# Patient Record
Sex: MALE | Race: BLACK OR AFRICAN AMERICAN | Employment: OTHER | URBAN - METROPOLITAN AREA
[De-identification: names, ages, dates, MRNs, and addresses within clinical notes are randomized per-mention and may not be internally consistent; named-entity substitution may affect disease eponyms.]

---

## 2019-02-07 ENCOUNTER — OFFICE VISIT (OUTPATIENT)
Dept: ONCOLOGY | Age: 84
End: 2019-02-07

## 2019-02-07 VITALS
DIASTOLIC BLOOD PRESSURE: 77 MMHG | WEIGHT: 178.2 LBS | BODY MASS INDEX: 27.97 KG/M2 | HEART RATE: 57 BPM | RESPIRATION RATE: 16 BRPM | HEIGHT: 67 IN | OXYGEN SATURATION: 97 % | SYSTOLIC BLOOD PRESSURE: 134 MMHG | TEMPERATURE: 97.8 F

## 2019-02-07 DIAGNOSIS — D64.9 ANEMIA, UNSPECIFIED TYPE: ICD-10-CM

## 2019-02-07 DIAGNOSIS — Z11.59 ENCOUNTER FOR SCREENING FOR OTHER VIRAL DISEASES: ICD-10-CM

## 2019-02-07 DIAGNOSIS — D64.9 ANEMIA, UNSPECIFIED TYPE: Primary | ICD-10-CM

## 2019-02-07 DIAGNOSIS — E66.01 MORBIDLY OBESE (HCC): ICD-10-CM

## 2019-02-07 DIAGNOSIS — N18.30 CKD (CHRONIC KIDNEY DISEASE) STAGE 3, GFR 30-59 ML/MIN (HCC): ICD-10-CM

## 2019-02-07 RX ORDER — BISMUTH SUBSALICYLATE 262 MG
1 TABLET,CHEWABLE ORAL DAILY
COMMUNITY

## 2019-02-07 NOTE — PROGRESS NOTES
Alexandria Horowitz is a 80 y.o. male here today as a new patient, anemia Patient noted with increased swelling to lower extremities, some wounds to left lower extremity.

## 2019-02-07 NOTE — PROGRESS NOTES
[facility] [place] [address] [address], [address] W: [phone]  F: [phone] for Visit:  
Myrna Michelle is a 80 y.o. male who is seen in consultation at the request of Dr. Cyn Umana for evaluation of Anemia History of Present Illness:  
Patient is a 80 y.o. male with multiple medical problems including coronary artery disease, aortic stenosis, hypertension, chronic kidney disease who was seen for progressive anemia. He was noted to have a hemoglobin of 7.8 g/dL with an MCV of 88 and no other CBC abnormalities on 1/9/19. Hemoglobin has been declining gradually over the last year. Prior evaluation had revealed a normal B12 level at around 400, no clear evidence of hemolysis. I have a ferritin level from last year that was normal at 124. His last measured creatinine was elevated at 1.3. He now comes for further evaluation. He has no bleeding. No dark stools. No fevers, chills, sweats. No blood in urine. He has no fevers, chills, sweats or new pain. No diarrhea. Has had no weight changes. He has chronic LE edema and stasis dermatitis. Uses a walker but lives alone and independently. He has had fluctuations in his weight. But overall he seems to have lost about 20 lb in 1 year Does not smoke or drink ETOH Past Medical History:  
Diagnosis Date  Cataracts, bilateral   
 Chronic pain  Diabetes ([facility] [address] 2/2/2011  Hypertension Past Surgical History:  
Procedure Laterality Date  HX HERNIA REPAIR    
 HX ORTHOPAEDIC    
 bilat hip replacement  HX PROSTATECTOMY Social History Tobacco Use  Smoking status: Never Smoker  Smokeless tobacco: Never Used Substance Use Topics  Alcohol use: No  
  
No family history on file. Current Outpatient Medications Medication Sig  
 multivitamin (ONE A DAY) tablet Take 1 Tab by mouth daily.  simvastatin (ZOCOR) 20 mg tablet Take 1 Tab by mouth nightly.  aspirin 81 mg chewable tablet Take 1 Tab by mouth daily.  timolol (TIMOPTIC) 0.5 % ophthalmic solution Administer 1 Drop to left eye two (2) times a day.  lisinopril (PRINIVIL, ZESTRIL) 20 mg tablet Take 20 mg by mouth daily.  amLODIPine (NORVASC) 10 mg tablet Take 10 mg by mouth daily.  furosemide (LASIX) 40 mg tablet Take 40 mg by mouth daily.  potassium chloride SA (MICRO-K) 10 mEq capsule Take 10 mEq by mouth daily.  phosphorus (K PHOS NEUTRAL) 250 mg tablet Take 1 Tab by mouth three (3) times daily (with meals). No current facility-administered medications for this visit. No Known Allergies Review of Systems: A complete review of systems was obtained, negative except as described above. Physical Exam:  
 
Visit Vitals /77 (BP 1 Location: Left arm, BP Patient Position: Sitting) Pulse (!) 57 Temp 97.8 °F (36.6 °C) (Oral) Resp 16 Ht 5' 7\" (1.702 m) Wt 178 lb 3.2 oz (80.8 kg) SpO2 97% BMI 27.91 kg/m² ECOG PS: 2 General: No distress, uses a walker Eyes: PERRLA, anicteric sclerae HENT: Atraumatic, OP clear Neck: Supple Lymphatic: No cervical, supraclavicular, or inguinal adenopathy Respiratory: CTAB, normal respiratory effort CV: Normal rate, regular rhythm, no murmurs, no peripheral edema GI: Soft, nontender, nondistended, no masses, no hepatomegaly, no splenomegaly MS: Uses a walker, he has bilateral ;LE edema with scaly skin that is broken down on the L ankle Skin: No rashes, except on LE Psych: Alert, oriented, appropriate affect, normal judgment/insight Results:  
 
Lab Results Component Value Date/Time WBC 8.1 12/20/2015 04:36 AM  
 HGB 9.9 (L) 12/20/2015 04:36 AM  
 HCT 31.5 (L) 12/20/2015 04:36 AM  
 PLATELET 162 89/68/7624 04:36 AM  
 MCV 93.5 12/20/2015 04:36 AM  
 ABS. NEUTROPHILS 5.6 12/20/2015 04:36 AM  
 
Lab Results Component Value Date/Time  Sodium 146 (H) 12/20/2015 04:36 AM  
 Potassium 3.6 12/20/2015 04:36 AM  
 Chloride 115 (H) 12/20/2015 04:36 AM  
 CO2 23 12/20/2015 04:36 AM  
 Glucose 88 12/20/2015 04:36 AM  
 BUN 17 12/20/2015 04:36 AM  
 Creatinine 1.04 12/20/2015 04:36 AM  
 GFR est AA >60 12/20/2015 04:36 AM  
 GFR est non-AA >60 12/20/2015 04:36 AM  
 Calcium 8.0 (L) 12/20/2015 04:36 AM  
 Glucose (POC) 94 12/21/2015 11:19 AM  
 
Lab Results Component Value Date/Time Bilirubin, total 0.5 12/20/2015 04:36 AM  
 ALT (SGPT) 31 12/20/2015 04:36 AM  
 AST (SGOT) 32 12/20/2015 04:36 AM  
 Alk. phosphatase 60 12/20/2015 04:36 AM  
 Protein, total 5.7 (L) 12/20/2015 04:36 AM  
 Albumin 2.6 (L) 12/20/2015 04:36 AM  
 Globulin 3.1 12/20/2015 04:36 AM  
 
 
Outside records Records reviewed and summarized above. Pathology report(s) reviewed above. Radiology report(s) reviewed above. Assessment:  
1) Normocytic anemia- progressive External records were reviewed and it is noted that his hemoglobin has dropped from over 9 g/dL about a year ago to 7.8 g/dL most recently. His MCV is in the normal range. He has no other CBC abnormalities. Prior workup did not reveal any hemolysis or B12 deficiency. His ferritin was normal at 124. He does not endorse any clear bleeding or B symptoms. He does have kidney disease but the anemia is out of proportion to his GFR. An underlying bone marrow disorder is likely such as MDS However he has limited performance status and limited social support. I discussed that we will start with some initial blood work to rule out other possibilities. However if this is unrevealing would have to discuss pros and cons of obtaining a diagnosis with a bone marrow biopsy. I do not see him being a candidate for any treatments for an underlying bone marrow disorder such as MDS 
 
2) CKD 3) CAD On aspirin 4) psychosocial 
He has limited social support. Lives alone. Comes with a friend today. Social work was consulted. Plan: · CBC with differential, methylmalonic acid, iron profile, ferritin, haptoglobin, hepatitis B, hepatitis C, gammopathy evaluation · Social work consulted as we are concerned about his safety at home. In addition he was encouraged to follow-up with Dr. Meron Villanueva and address the open wound on his left leg. Return to clinic in 3-4 weeks to discuss results. I appreciate the opportunity to participate in Mr. Bernadette schilling.  
 
Signed By: Luz Miller MD

## 2019-02-13 LAB
ALBUMIN SERPL ELPH-MCNC: 3.4 G/DL (ref 2.9–4.4)
ALBUMIN SERPL-MCNC: 3.9 G/DL (ref 3.5–4.7)
ALBUMIN/GLOB SERPL: 0.9 {RATIO} (ref 0.7–1.7)
ALBUMIN/GLOB SERPL: 1.1 {RATIO} (ref 1.2–2.2)
ALP SERPL-CCNC: 66 IU/L (ref 39–117)
ALPHA1 GLOB SERPL ELPH-MCNC: 0.3 G/DL (ref 0–0.4)
ALPHA2 GLOB SERPL ELPH-MCNC: 0.9 G/DL (ref 0.4–1)
ALT SERPL-CCNC: 7 IU/L (ref 0–44)
AST SERPL-CCNC: 11 IU/L (ref 0–40)
B-GLOBULIN SERPL ELPH-MCNC: 1.2 G/DL (ref 0.7–1.3)
BASOPHILS # BLD AUTO: 0 X10E3/UL (ref 0–0.2)
BASOPHILS NFR BLD AUTO: 0 %
BILIRUB SERPL-MCNC: 0.4 MG/DL (ref 0–1.2)
BUN SERPL-MCNC: 24 MG/DL (ref 8–27)
BUN/CREAT SERPL: 15 (ref 10–24)
CALCIUM SERPL-MCNC: 9.2 MG/DL (ref 8.6–10.2)
CHLORIDE SERPL-SCNC: 109 MMOL/L (ref 96–106)
CO2 SERPL-SCNC: 23 MMOL/L (ref 20–29)
CREAT SERPL-MCNC: 1.6 MG/DL (ref 0.76–1.27)
EOSINOPHIL # BLD AUTO: 0.2 X10E3/UL (ref 0–0.4)
EOSINOPHIL NFR BLD AUTO: 3 %
ERYTHROCYTE [DISTWIDTH] IN BLOOD BY AUTOMATED COUNT: 15.5 % (ref 12.3–15.4)
FERRITIN SERPL-MCNC: 58 NG/ML (ref 30–400)
GAMMA GLOB SERPL ELPH-MCNC: 1.7 G/DL (ref 0.4–1.8)
GLOBULIN SER CALC-MCNC: 3.6 G/DL (ref 1.5–4.5)
GLOBULIN SER-MCNC: 4.1 G/DL (ref 2.2–3.9)
GLUCOSE SERPL-MCNC: 84 MG/DL (ref 65–99)
HAPTOGLOB SERPL-MCNC: 218 MG/DL (ref 34–200)
HBV SURFACE AG SERPL QL IA: NEGATIVE
HCT VFR BLD AUTO: 23.9 % (ref 37.5–51)
HCV AB S/CO SERPL IA: 0.1 S/CO RATIO (ref 0–0.9)
HGB BLD-MCNC: 7.2 G/DL (ref 13–17.7)
HIV 1+2 AB+HIV1 P24 AG SERPL QL IA: NON REACTIVE
IGA SERPL-MCNC: 350 MG/DL (ref 61–437)
IGG SERPL-MCNC: 1792 MG/DL (ref 700–1600)
IGM SERPL-MCNC: 59 MG/DL (ref 15–143)
IMM GRANULOCYTES # BLD AUTO: 0 X10E3/UL (ref 0–0.1)
IMM GRANULOCYTES NFR BLD AUTO: 0 %
INTERPRETATION SERPL IEP-IMP: ABNORMAL
IRON SATN MFR SERPL: 7 % (ref 15–55)
IRON SERPL-MCNC: 21 UG/DL (ref 38–169)
KAPPA LC FREE SER-MCNC: 77.3 MG/L (ref 3.3–19.4)
KAPPA LC FREE/LAMBDA FREE SER: 1.32 {RATIO} (ref 0.26–1.65)
LAMBDA LC FREE SERPL-MCNC: 58.7 MG/L (ref 5.7–26.3)
LYMPHOCYTES # BLD AUTO: 1.5 X10E3/UL (ref 0.7–3.1)
LYMPHOCYTES NFR BLD AUTO: 19 %
Lab: ABNORMAL
M PROTEIN SERPL ELPH-MCNC: ABNORMAL G/DL
MCH RBC QN AUTO: 27.6 PG (ref 26.6–33)
MCHC RBC AUTO-ENTMCNC: 30.1 G/DL (ref 31.5–35.7)
MCV RBC AUTO: 92 FL (ref 79–97)
METHYLMALONATE SERPL-SCNC: 389 NMOL/L (ref 0–378)
MONOCYTES # BLD AUTO: 0.8 X10E3/UL (ref 0.1–0.9)
MONOCYTES NFR BLD AUTO: 10 %
NEUTROPHILS # BLD AUTO: 5.3 X10E3/UL (ref 1.4–7)
NEUTROPHILS NFR BLD AUTO: 68 %
PATH REV BLD -IMP: ABNORMAL
PATHOLOGIST NAME: ABNORMAL
PLATELET # BLD AUTO: 441 X10E3/UL (ref 150–379)
PLEASE NOTE:, 149534: ABNORMAL
POTASSIUM SERPL-SCNC: 4.2 MMOL/L (ref 3.5–5.2)
PROT SERPL-MCNC: 7.5 G/DL (ref 6–8.5)
RBC # BLD AUTO: 2.61 X10E6/UL (ref 4.14–5.8)
SODIUM SERPL-SCNC: 148 MMOL/L (ref 134–144)
TIBC SERPL-MCNC: 285 UG/DL (ref 250–450)
UIBC SERPL-MCNC: 264 UG/DL (ref 111–343)
WBC # BLD AUTO: 7.8 X10E3/UL (ref 3.4–10.8)

## 2019-05-21 ENCOUNTER — OFFICE VISIT (OUTPATIENT)
Dept: ONCOLOGY | Age: 84
End: 2019-05-21

## 2019-05-21 VITALS
OXYGEN SATURATION: 96 % | SYSTOLIC BLOOD PRESSURE: 127 MMHG | DIASTOLIC BLOOD PRESSURE: 69 MMHG | WEIGHT: 174.5 LBS | TEMPERATURE: 97.5 F | HEART RATE: 69 BPM | BODY MASS INDEX: 27.39 KG/M2 | RESPIRATION RATE: 16 BRPM | HEIGHT: 67 IN

## 2019-05-21 DIAGNOSIS — E66.01 MORBIDLY OBESE (HCC): ICD-10-CM

## 2019-05-21 DIAGNOSIS — D64.9 ANEMIA, UNSPECIFIED TYPE: Primary | ICD-10-CM

## 2019-05-21 DIAGNOSIS — N18.30 CKD (CHRONIC KIDNEY DISEASE) STAGE 3, GFR 30-59 ML/MIN (HCC): ICD-10-CM

## 2019-05-21 RX ORDER — DIPHENHYDRAMINE HYDROCHLORIDE 50 MG/ML
50 INJECTION, SOLUTION INTRAMUSCULAR; INTRAVENOUS AS NEEDED
Status: CANCELLED
Start: 2019-06-19

## 2019-05-21 RX ORDER — ACETAMINOPHEN 325 MG/1
650 TABLET ORAL AS NEEDED
Status: CANCELLED
Start: 2019-06-19

## 2019-05-21 RX ORDER — SODIUM CHLORIDE 9 MG/ML
10 INJECTION INTRAMUSCULAR; INTRAVENOUS; SUBCUTANEOUS AS NEEDED
Status: CANCELLED | OUTPATIENT
Start: 2019-06-19

## 2019-05-21 RX ORDER — ONDANSETRON 2 MG/ML
8 INJECTION INTRAMUSCULAR; INTRAVENOUS AS NEEDED
Status: CANCELLED | OUTPATIENT
Start: 2019-06-19

## 2019-05-21 RX ORDER — ONDANSETRON 2 MG/ML
8 INJECTION INTRAMUSCULAR; INTRAVENOUS AS NEEDED
Status: CANCELLED | OUTPATIENT
Start: 2019-06-05

## 2019-05-21 RX ORDER — HYDROCORTISONE SODIUM SUCCINATE 100 MG/2ML
100 INJECTION, POWDER, FOR SOLUTION INTRAMUSCULAR; INTRAVENOUS AS NEEDED
Status: CANCELLED | OUTPATIENT
Start: 2019-06-05

## 2019-05-21 RX ORDER — ALBUTEROL SULFATE 0.83 MG/ML
2.5 SOLUTION RESPIRATORY (INHALATION) AS NEEDED
Status: CANCELLED
Start: 2019-06-19

## 2019-05-21 RX ORDER — EPINEPHRINE 1 MG/ML
0.3 INJECTION, SOLUTION, CONCENTRATE INTRAVENOUS AS NEEDED
Status: CANCELLED | OUTPATIENT
Start: 2019-06-05

## 2019-05-21 RX ORDER — ALBUTEROL SULFATE 0.83 MG/ML
2.5 SOLUTION RESPIRATORY (INHALATION) AS NEEDED
Status: CANCELLED
Start: 2019-06-05

## 2019-05-21 RX ORDER — EPINEPHRINE 1 MG/ML
0.3 INJECTION, SOLUTION, CONCENTRATE INTRAVENOUS AS NEEDED
Status: CANCELLED | OUTPATIENT
Start: 2019-06-19

## 2019-05-21 RX ORDER — ACETAMINOPHEN 325 MG/1
650 TABLET ORAL AS NEEDED
Status: CANCELLED
Start: 2019-06-05

## 2019-05-21 RX ORDER — DIPHENHYDRAMINE HYDROCHLORIDE 50 MG/ML
50 INJECTION, SOLUTION INTRAMUSCULAR; INTRAVENOUS AS NEEDED
Status: CANCELLED
Start: 2019-06-05

## 2019-05-21 RX ORDER — SODIUM CHLORIDE 9 MG/ML
10 INJECTION INTRAMUSCULAR; INTRAVENOUS; SUBCUTANEOUS AS NEEDED
Status: CANCELLED | OUTPATIENT
Start: 2019-06-05

## 2019-05-21 RX ORDER — SODIUM CHLORIDE 0.9 % (FLUSH) 0.9 %
10 SYRINGE (ML) INJECTION AS NEEDED
Status: CANCELLED
Start: 2019-06-05

## 2019-05-21 RX ORDER — HYDROCORTISONE SODIUM SUCCINATE 100 MG/2ML
100 INJECTION, POWDER, FOR SOLUTION INTRAMUSCULAR; INTRAVENOUS AS NEEDED
Status: CANCELLED | OUTPATIENT
Start: 2019-06-19

## 2019-05-21 RX ORDER — SODIUM CHLORIDE 0.9 % (FLUSH) 0.9 %
10 SYRINGE (ML) INJECTION AS NEEDED
Status: CANCELLED
Start: 2019-06-19

## 2019-05-21 RX ORDER — HEPARIN 100 UNIT/ML
300-500 SYRINGE INTRAVENOUS AS NEEDED
Status: CANCELLED
Start: 2019-06-05

## 2019-05-21 RX ORDER — HEPARIN 100 UNIT/ML
300-500 SYRINGE INTRAVENOUS AS NEEDED
Status: CANCELLED
Start: 2019-06-19

## 2019-05-21 NOTE — PROGRESS NOTES
Reviewed record in preparation for visit and have obtained necessary documentation. Identified pt with two pt identifiers(name and ). Health Maintenance Due   Topic    HEMOGLOBIN A1C Q6M     LIPID PANEL Q1     FOOT EXAM Q1     MICROALBUMIN Q1     EYE EXAM RETINAL OR DILATED     DTaP/Tdap/Td series (1 - Tdap)    Shingrix Vaccine Age 50> (1 of 2)    GLAUCOMA SCREENING Q2Y     Pneumococcal 65+ years (2 of 2 - PCV13)   [address]          Chief Complaint   Patient presents with    Follow-up     Anemia        Wt Readings from Last 3 Encounters:   19 174 lb 8 oz (79.2 kg)   19 178 lb 3.2 oz (80.8 kg)   12/21/15 218 lb 0.6 oz (98.9 kg)     Temp Readings from Last 3 Encounters:   19 97.8 °F (36.6 °C) (Oral)   12/21/15 98 °F (36.7 °C)   14 98.2 °F (36.8 °C)     BP Readings from Last 3 Encounters:   19 134/77   12/21/15 165/63   14 (!) 131/33     Pulse Readings from Last 3 Encounters:   19 (!) 57   12/21/15 61   14 (!) 55           Learning Assessment:  :     Learning Assessment 2019   PRIMARY LEARNER Patient   BARRIERS PRIMARY LEARNER NONE   PRIMARY LANGUAGE ENGLISH   LEARNER PREFERENCE PRIMARY READING     LISTENING   ANSWERED BY patient   RELATIONSHIP SELF       Depression Screening:  :     3 most recent PHQ Screens 2019   Little interest or pleasure in doing things Not at all   Feeling down, depressed, irritable, or hopeless Not at all   Total Score PHQ 2 0       Fall Risk Assessment:  :     Fall Risk Assessment, last 12 mths 2019   Able to walk? Yes   Fall in past 12 months? No       Abuse Screening:  :     Abuse Screening Questionnaire 2019   Do you ever feel afraid of your partner? N   Are you in a relationship with someone who physically or mentally threatens you? N   Is it safe for you to go home?  Y       Coordination of Care Questionnaire:  :     1) Have you been to an emergency room, urgent care clinic since your last visit? no   Hospitalized since your last visit? no             2) Have you seen or consulted any other health care providers outside of [address] [place] [address] since your last visit? no  (Include any pap smears or colon screenings in this section.)    3) Do you have an Advance Directive on file? no    4) Are you interested in receiving information on Advance Directives? NO      Patient is accompanied by son I have received verbal consent from Aminata Obregon to discuss any/all medical information while they are present in the room.

## 2019-05-21 NOTE — Clinical Note
SET UP FOR INJECTAFER X 2B12- 1000 MCG IM WEEKLY X 4 THEN MONTHLYLABS MONTHLY IN [facility]- CBC, IRON PROFILE, FERRITIN AND B12

## 2019-06-05 ENCOUNTER — HOSPITAL ENCOUNTER (OUTPATIENT)
Dept: INFUSION THERAPY | Age: 84
Discharge: HOME | End: 2019-06-05
Payer: MEDICARE

## 2019-06-05 VITALS
HEART RATE: 69 BPM | RESPIRATION RATE: 16 BRPM | DIASTOLIC BLOOD PRESSURE: 79 MMHG | TEMPERATURE: 96.9 F | SYSTOLIC BLOOD PRESSURE: 128 MMHG

## 2019-06-05 DIAGNOSIS — D64.9 ANEMIA, UNSPECIFIED TYPE: Primary | ICD-10-CM

## 2019-06-05 LAB
FERRITIN SERPL-MCNC: 46 NG/ML (ref 26–388)
HCT VFR BLD AUTO: 21.4 % (ref 36.6–50.3)
HGB BLD-MCNC: 6.1 G/DL (ref 12.1–17)
IRON SATN MFR SERPL: 5 % (ref 20–50)
IRON SERPL-MCNC: 16 UG/DL (ref 35–150)
TIBC SERPL-MCNC: 307 UG/DL (ref 250–450)

## 2019-06-05 PROCEDURE — 96372 THER/PROPH/DIAG INJ SC/IM: CPT

## 2019-06-05 PROCEDURE — 82728 ASSAY OF FERRITIN: CPT

## 2019-06-05 PROCEDURE — 84466 ASSAY OF TRANSFERRIN: CPT

## 2019-06-05 PROCEDURE — 74011250636 HC RX REV CODE- 250/636: Performed by: REGISTERED NURSE

## 2019-06-05 PROCEDURE — 96365 THER/PROPH/DIAG IV INF INIT: CPT

## 2019-06-05 PROCEDURE — 36415 COLL VENOUS BLD VENIPUNCTURE: CPT

## 2019-06-05 PROCEDURE — 83540 ASSAY OF IRON: CPT

## 2019-06-05 PROCEDURE — 85018 HEMOGLOBIN: CPT

## 2019-06-05 RX ORDER — CYANOCOBALAMIN 1000 UG/ML
1000 INJECTION, SOLUTION INTRAMUSCULAR; SUBCUTANEOUS ONCE
Status: COMPLETED | OUTPATIENT
Start: 2019-06-05 | End: 2019-06-05

## 2019-06-05 RX ORDER — SODIUM CHLORIDE 9 MG/ML
500 INJECTION, SOLUTION INTRAVENOUS ONCE
Status: COMPLETED | OUTPATIENT
Start: 2019-06-05 | End: 2019-06-05

## 2019-06-05 RX ADMIN — SODIUM CHLORIDE 500 ML: 900 INJECTION, SOLUTION INTRAVENOUS at 13:57

## 2019-06-05 RX ADMIN — FERRIC CARBOXYMALTOSE INJECTION 750 MG: 50 INJECTION, SOLUTION INTRAVENOUS at 13:57

## 2019-06-05 RX ADMIN — CYANOCOBALAMIN 1000 MCG: 1000 INJECTION INTRAMUSCULAR; SUBCUTANEOUS at 14:39

## 2019-06-05 NOTE — PROGRESS NOTES
Outpatient Infusion Center Progress Note    1300 Pt admit to [facility] for Injectafer 1/2 and B12 injection ambulatory with rollator assisted by supportive son in stable condition. Assessment completed. No new concerns voiced. First dose education provided on new medications. PIV placed in right hand, labs drawn and sent per orders, line connected to [address] [place] [address]. Patient Vitals for the past 12 hrs:   Temp Pulse Resp BP   06/05/19 1441 96.9 °F (36.1 °C) 69 16 128/79   06/05/19 1300 96.8 °F (36 °C) 60 16 116/63       Medications:  B12 IM left arm  Injectafer over 20 min      1445 Pt tolerated treatment well. Pt observed post infusion with no s/s of reaction. PIV flushed with positive blood return and removed per protocol. D/c home ambulatory w/rollator in no distress. Pt aware of next appointment scheduled for 06/12/19.     Recent Results (from the past 12 hour(s))   IRON PROFILE    Collection Time: 06/05/19  1:17 PM   Result Value Ref Range    Iron 16 (L) 35 - 150 ug/dL    TIBC 307 250 - 450 ug/dL    Iron % saturation 5 (L) 20 - 50 %   FERRITIN    Collection Time: 06/05/19  1:17 PM   Result Value Ref Range    Ferritin 46 26 - 388 NG/ML   HGB & HCT    Collection Time: 06/05/19  1:17 PM   Result Value Ref Range    HGB 6.1 (L) 12.1 - 17.0 g/dL    HCT 21.4 (L) 36.6 - 50.3 %

## 2019-06-06 LAB — TRANSFERRIN SERPL-MCNC: 232 MG/DL (ref 200–370)

## 2019-06-07 NOTE — PROGRESS NOTES
Hemoglobin really low  Continue plans for injectafer but he will need to be set up for 1 unit of PRBC next week

## 2019-06-11 ENCOUNTER — HOSPITAL ENCOUNTER (INPATIENT)
Age: 84
LOS: 3 days | Discharge: NURSING, REHAB OR LONG-TERM CARE | DRG: 812 | End: 2019-06-22
Attending: STUDENT IN AN ORGANIZED HEALTH CARE EDUCATION/TRAINING PROGRAM | Admitting: HOSPITALIST
Payer: MEDICARE

## 2019-06-11 DIAGNOSIS — D64.9 ANEMIA, UNSPECIFIED TYPE: ICD-10-CM

## 2019-06-11 DIAGNOSIS — E87.0 ACUTE HYPERNATREMIA: ICD-10-CM

## 2019-06-11 DIAGNOSIS — D63.1 ANEMIA DUE TO STAGE 3 CHRONIC KIDNEY DISEASE (HCC): Primary | ICD-10-CM

## 2019-06-11 DIAGNOSIS — N18.30 ANEMIA DUE TO STAGE 3 CHRONIC KIDNEY DISEASE (HCC): Primary | ICD-10-CM

## 2019-06-11 DIAGNOSIS — R94.31 ABNORMAL EKG: ICD-10-CM

## 2019-06-11 PROBLEM — E86.0 DEHYDRATION: Status: ACTIVE | Noted: 2019-06-11

## 2019-06-11 LAB
ALBUMIN SERPL-MCNC: 3 G/DL (ref 3.5–5)
ALBUMIN/GLOB SERPL: 0.7 {RATIO} (ref 1.1–2.2)
ALP SERPL-CCNC: 81 U/L (ref 45–117)
ALT SERPL-CCNC: 12 U/L (ref 12–78)
ANION GAP SERPL CALC-SCNC: 4 MMOL/L (ref 5–15)
AST SERPL-CCNC: 14 U/L (ref 15–37)
BASOPHILS # BLD: 0 K/UL (ref 0–0.1)
BASOPHILS NFR BLD: 0 % (ref 0–1)
BILIRUB SERPL-MCNC: 0.3 MG/DL (ref 0.2–1)
BUN SERPL-MCNC: 26 MG/DL (ref 6–20)
BUN/CREAT SERPL: 19 (ref 12–20)
CALCIUM SERPL-MCNC: 8.6 MG/DL (ref 8.5–10.1)
CHLORIDE SERPL-SCNC: 116 MMOL/L (ref 97–108)
CO2 SERPL-SCNC: 26 MMOL/L (ref 21–32)
COMMENT, HOLDF: NORMAL
CREAT SERPL-MCNC: 1.4 MG/DL (ref 0.7–1.3)
DIFFERENTIAL METHOD BLD: ABNORMAL
EOSINOPHIL # BLD: 0.2 K/UL (ref 0–0.4)
EOSINOPHIL NFR BLD: 2 % (ref 0–7)
ERYTHROCYTE [DISTWIDTH] IN BLOOD BY AUTOMATED COUNT: 20.3 % (ref 11.5–14.5)
GLOBULIN SER CALC-MCNC: 4.3 G/DL (ref 2–4)
GLUCOSE SERPL-MCNC: 90 MG/DL (ref 65–100)
HCT VFR BLD AUTO: 23.3 % (ref 36.6–50.3)
HEMOCCULT STL QL: NEGATIVE
HGB BLD-MCNC: 6.6 G/DL (ref 12.1–17)
IMM GRANULOCYTES # BLD AUTO: 0.1 K/UL (ref 0–0.04)
IMM GRANULOCYTES NFR BLD AUTO: 1 % (ref 0–0.5)
LYMPHOCYTES # BLD: 1.1 K/UL (ref 0.8–3.5)
LYMPHOCYTES NFR BLD: 12 % (ref 12–49)
MCH RBC QN AUTO: 26.8 PG (ref 26–34)
MCHC RBC AUTO-ENTMCNC: 28.3 G/DL (ref 30–36.5)
MCV RBC AUTO: 94.7 FL (ref 80–99)
MONOCYTES # BLD: 1.1 K/UL (ref 0–1)
MONOCYTES NFR BLD: 12 % (ref 5–13)
NEUTS SEG # BLD: 6.7 K/UL (ref 1.8–8)
NEUTS SEG NFR BLD: 73 % (ref 32–75)
NRBC # BLD: 0 K/UL (ref 0–0.01)
NRBC BLD-RTO: 0 PER 100 WBC
PLATELET # BLD AUTO: 343 K/UL (ref 150–400)
PMV BLD AUTO: 9.8 FL (ref 8.9–12.9)
POTASSIUM SERPL-SCNC: 4.3 MMOL/L (ref 3.5–5.1)
PROT SERPL-MCNC: 7.3 G/DL (ref 6.4–8.2)
RBC # BLD AUTO: 2.46 M/UL (ref 4.1–5.7)
RBC MORPH BLD: ABNORMAL
SAMPLES BEING HELD,HOLD: NORMAL
SODIUM SERPL-SCNC: 146 MMOL/L (ref 136–145)
TROPONIN I SERPL-MCNC: <0.05 NG/ML
WBC # BLD AUTO: 9.2 K/UL (ref 4.1–11.1)

## 2019-06-11 PROCEDURE — 30233N1 TRANSFUSION OF NONAUTOLOGOUS RED BLOOD CELLS INTO PERIPHERAL VEIN, PERCUTANEOUS APPROACH: ICD-10-PCS | Performed by: HOSPITALIST

## 2019-06-11 PROCEDURE — 65390000012 HC CONDITION CODE 44 OBSERVATION

## 2019-06-11 PROCEDURE — 80053 COMPREHEN METABOLIC PANEL: CPT

## 2019-06-11 PROCEDURE — 99284 EMERGENCY DEPT VISIT MOD MDM: CPT

## 2019-06-11 PROCEDURE — 86920 COMPATIBILITY TEST SPIN: CPT

## 2019-06-11 PROCEDURE — P9016 RBC LEUKOCYTES REDUCED: HCPCS

## 2019-06-11 PROCEDURE — 65660000000 HC RM CCU STEPDOWN

## 2019-06-11 PROCEDURE — 82272 OCCULT BLD FECES 1-3 TESTS: CPT

## 2019-06-11 PROCEDURE — 82607 VITAMIN B-12: CPT

## 2019-06-11 PROCEDURE — 82746 ASSAY OF FOLIC ACID SERUM: CPT

## 2019-06-11 PROCEDURE — 36430 TRANSFUSION BLD/BLD COMPNT: CPT

## 2019-06-11 PROCEDURE — 85025 COMPLETE CBC W/AUTO DIFF WBC: CPT

## 2019-06-11 PROCEDURE — 36415 COLL VENOUS BLD VENIPUNCTURE: CPT

## 2019-06-11 PROCEDURE — 86900 BLOOD TYPING SEROLOGIC ABO: CPT

## 2019-06-11 PROCEDURE — 93005 ELECTROCARDIOGRAM TRACING: CPT

## 2019-06-11 PROCEDURE — 84484 ASSAY OF TROPONIN QUANT: CPT

## 2019-06-11 RX ORDER — SODIUM CHLORIDE 9 MG/ML
250 INJECTION, SOLUTION INTRAVENOUS AS NEEDED
Status: DISCONTINUED | OUTPATIENT
Start: 2019-06-11 | End: 2019-06-22 | Stop reason: HOSPADM

## 2019-06-11 RX ORDER — SODIUM CHLORIDE 0.9 % (FLUSH) 0.9 %
5-40 SYRINGE (ML) INJECTION AS NEEDED
Status: DISCONTINUED | OUTPATIENT
Start: 2019-06-11 | End: 2019-06-22 | Stop reason: HOSPADM

## 2019-06-11 RX ORDER — SODIUM CHLORIDE 0.9 % (FLUSH) 0.9 %
5-40 SYRINGE (ML) INJECTION EVERY 8 HOURS
Status: DISCONTINUED | OUTPATIENT
Start: 2019-06-12 | End: 2019-06-22 | Stop reason: HOSPADM

## 2019-06-11 NOTE — ED TRIAGE NOTES
Pt was sent to the ER by his doctor for a low \"blood work\" Pt states that he is asymptomatic with no SOB, CP, N/V

## 2019-06-11 NOTE — ED PROVIDER NOTES
80 y.o. male with past medical history significant for HTN, DM, heart failure, and chronic pain who presents via private vehicle from home accompanied by a caregiver with chief complaint of a referral. Patient referred from Dr. Amalia Smith office after his scheduled [phone] appointment d/t noticing patient's low Hgb level from blood work done 6 days ago (6/5/19) after B12 infusion. Patient is asymptomatic upon arrival, specifically denying pain, stating, \"I feel great. \" No SOB, chest pain, or abdominal pain. There are no other acute medical concerns at this time. Old Chart Review:  6/5/19: patient's Hgb 6.1. Social hx: Never tobacco smoker; Denies EtOH use; Denies illicit drug use  PCP: Kailey Garcia MD    Note written by Joanen Andrews, as dictated by Miguelito Schultz MD 6:00 PM           Past Medical History:   Diagnosis Date    Cataracts, bilateral     Chronic pain     Diabetes ([facility] [address] 2/2/2011    Heart failure ([facility] [address]     Hypertension        Past Surgical History:   Procedure Laterality Date    HX HERNIA REPAIR      HX ORTHOPAEDIC      bilat hip replacement    HX PROSTATECTOMY           History reviewed. No pertinent family history.     Social History     Socioeconomic History    Marital status:      Spouse name: Not on file    Number of children: Not on file    Years of education: Not on file    Highest education level: Not on file   Occupational History    Not on file   Social Needs    Financial resource strain: Not on file    Food insecurity:     Worry: Not on file     Inability: Not on file    Transportation needs:     Medical: Not on file     Non-medical: Not on file   Tobacco Use    Smoking status: Never Smoker    Smokeless tobacco: Never Used   Substance and Sexual Activity    Alcohol use: No    Drug use: No    Sexual activity: Not Currently   Lifestyle    Physical activity:     Days per week: Not on file     Minutes per session: Not on file    Stress: Not on file   Relationships    Social connections:     Talks on phone: Not on file     Gets together: Not on file     Attends [employer] service: Not on file     Active member of club or organization: Not on file     Attends meetings of clubs or organizations: Not on file     Relationship status: Not on file    Intimate partner violence:     Fear of current or ex partner: Not on file     Emotionally abused: Not on file     Physically abused: Not on file     Forced sexual activity: Not on file   Other Topics Concern    Not on file   Social History Narrative    Not on file         ALLERGIES: Patient has no known allergies. Review of Systems   Constitutional: Negative for chills, diaphoresis, fatigue and fever. HENT: Negative for congestion, rhinorrhea, sinus pressure, sore throat, trouble swallowing and voice change. Eyes: Negative for photophobia and visual disturbance. Respiratory: Negative for cough, chest tightness and shortness of breath. Cardiovascular: Negative for chest pain, palpitations and leg swelling. Gastrointestinal: Negative for abdominal pain, blood in stool, constipation, diarrhea, nausea and vomiting. Musculoskeletal: Negative for arthralgias, myalgias and neck pain. Neurological: Negative for dizziness, weakness, light-headedness, numbness and headaches. All other systems reviewed and are negative. Vitals:    06/11/19 1637 06/11/19 1803 06/11/19 1804   BP:  159/60    Pulse: 61     SpO2: 98%  98%            Physical Exam   Constitutional: He is oriented to person, place, and time. He appears well-developed and well-nourished. No distress. No acute distress. HENT:   Head: Normocephalic and atraumatic. Nose: Nose normal.   Mouth/Throat: Oropharynx is clear and moist. No oropharyngeal exudate. Eyes: Conjunctivae and EOM are normal. Right eye exhibits no discharge. Left eye exhibits no discharge. No scleral icterus. Neck: Normal range of motion. Neck supple.  No JVD present. No tracheal deviation present. No thyromegaly present. Cardiovascular: Normal rate, regular rhythm, normal heart sounds and intact distal pulses. Exam reveals no gallop and no friction rub. No murmur heard. Pulmonary/Chest: Effort normal and breath sounds normal. No stridor. No respiratory distress. He has no wheezes. He has no rales. He exhibits no tenderness. Abdominal: Bowel sounds are normal. He exhibits no distension and no mass. There is no tenderness. There is no rebound. Musculoskeletal: Normal range of motion. He exhibits edema (severe in bilateral lower extremities). He exhibits no tenderness. Lymphadenopathy:     He has no cervical adenopathy. Neurological: He is alert and oriented to person, place, and time. No cranial nerve deficit. Coordination normal.   Skin: Skin is warm and dry. No rash noted. He is not diaphoretic. No erythema. No pallor. Venous stasis ulcers to bilateral LE with serosanguinous drainage. Psychiatric: He has a normal mood and affect. His behavior is normal. Judgment and thought content normal.   Nursing note and vitals reviewed. Note written by Joanne Schmid, as dictated by Avril Sorto MD 6:00 PM    MDM  Number of Diagnoses or Management Options  Anemia due to stage 3 chronic kidney disease [facility]):      Amount and/or Complexity of Data Reviewed  Clinical lab tests: ordered and reviewed  Review and summarize past medical records: yes  Independent visualization of images, tracings, or specimens: yes    Risk of Complications, Morbidity, and/or Mortality  Presenting problems: moderate  Diagnostic procedures: moderate  Management options: moderate    Critical Care  Total time providing critical care: 30-74 minutes (Total critical care time spent exclusive of procedures:  35 min.)    Patient Progress  Patient progress: stable         Procedures    ED EKG interpretation:  Rhythm: normal sinus rhythm; and regular .  Rate (approx.): 68; ST/T wave: no abnormalities. Note written by Joanne Black, as dictated by Duke Ortiz MD 6:48 PM    CONSULT NOTE:  8:55 PM  Duke Ortiz MD communicated with Dr. Kena Styles, Consult for Hospitalist via [facility] Text. Discussed available diagnostic tests and clinical findings. Will admit patient.

## 2019-06-12 LAB
ANION GAP SERPL CALC-SCNC: 4 MMOL/L (ref 5–15)
APPEARANCE UR: CLEAR
ATRIAL RATE: 79 BPM
BACTERIA URNS QL MICRO: NEGATIVE /HPF
BASOPHILS # BLD: 0 K/UL (ref 0–0.1)
BASOPHILS NFR BLD: 0 % (ref 0–1)
BILIRUB UR QL: NEGATIVE
BUN SERPL-MCNC: 22 MG/DL (ref 6–20)
BUN/CREAT SERPL: 19 (ref 12–20)
CALCIUM SERPL-MCNC: 8.2 MG/DL (ref 8.5–10.1)
CALCULATED R AXIS, ECG10: 43 DEGREES
CALCULATED T AXIS, ECG11: 28 DEGREES
CHLORIDE SERPL-SCNC: 118 MMOL/L (ref 97–108)
CO2 SERPL-SCNC: 24 MMOL/L (ref 21–32)
COLOR UR: NORMAL
CREAT SERPL-MCNC: 1.18 MG/DL (ref 0.7–1.3)
DIAGNOSIS, 93000: NORMAL
DIFFERENTIAL METHOD BLD: ABNORMAL
EOSINOPHIL # BLD: 0.1 K/UL (ref 0–0.4)
EOSINOPHIL NFR BLD: 1 % (ref 0–7)
EPITH CASTS URNS QL MICRO: NORMAL /LPF
ERYTHROCYTE [DISTWIDTH] IN BLOOD BY AUTOMATED COUNT: 19.1 % (ref 11.5–14.5)
EST. AVERAGE GLUCOSE BLD GHB EST-MCNC: ABNORMAL MG/DL
FOLATE SERPL-MCNC: 19.1 NG/ML (ref 5–21)
GLUCOSE BLD STRIP.AUTO-MCNC: 119 MG/DL (ref 65–100)
GLUCOSE BLD STRIP.AUTO-MCNC: 123 MG/DL (ref 65–100)
GLUCOSE BLD STRIP.AUTO-MCNC: 89 MG/DL (ref 65–100)
GLUCOSE BLD STRIP.AUTO-MCNC: 99 MG/DL (ref 65–100)
GLUCOSE SERPL-MCNC: 90 MG/DL (ref 65–100)
GLUCOSE UR STRIP.AUTO-MCNC: NEGATIVE MG/DL
HBA1C MFR BLD: <3.5 % (ref 4.2–6.3)
HCT VFR BLD AUTO: 28.3 % (ref 36.6–50.3)
HGB BLD-MCNC: 8.2 G/DL (ref 12.1–17)
HGB UR QL STRIP: NEGATIVE
HYALINE CASTS URNS QL MICRO: NORMAL /LPF (ref 0–5)
IMM GRANULOCYTES # BLD AUTO: 0.1 K/UL (ref 0–0.04)
IMM GRANULOCYTES NFR BLD AUTO: 1 % (ref 0–0.5)
IRON SATN MFR SERPL: 10 % (ref 20–50)
IRON SERPL-MCNC: 28 UG/DL (ref 35–150)
KETONES UR QL STRIP.AUTO: NEGATIVE MG/DL
LEUKOCYTE ESTERASE UR QL STRIP.AUTO: NEGATIVE
LYMPHOCYTES # BLD: 0.7 K/UL (ref 0.8–3.5)
LYMPHOCYTES NFR BLD: 7 % (ref 12–49)
MCH RBC QN AUTO: 27.2 PG (ref 26–34)
MCHC RBC AUTO-ENTMCNC: 29 G/DL (ref 30–36.5)
MCV RBC AUTO: 94 FL (ref 80–99)
MONOCYTES # BLD: 1 K/UL (ref 0–1)
MONOCYTES NFR BLD: 10 % (ref 5–13)
NEUTS SEG # BLD: 8.3 K/UL (ref 1.8–8)
NEUTS SEG NFR BLD: 81 % (ref 32–75)
NITRITE UR QL STRIP.AUTO: NEGATIVE
NRBC # BLD: 0 K/UL (ref 0–0.01)
NRBC BLD-RTO: 0 PER 100 WBC
PH UR STRIP: 5.5 [PH] (ref 5–8)
PLATELET # BLD AUTO: 327 K/UL (ref 150–400)
PMV BLD AUTO: 9.7 FL (ref 8.9–12.9)
POTASSIUM SERPL-SCNC: 3.8 MMOL/L (ref 3.5–5.1)
PROT UR STRIP-MCNC: NEGATIVE MG/DL
Q-T INTERVAL, ECG07: 428 MS
QRS DURATION, ECG06: 86 MS
QTC CALCULATION (BEZET), ECG08: 455 MS
RBC # BLD AUTO: 3.01 M/UL (ref 4.1–5.7)
RBC #/AREA URNS HPF: NORMAL /HPF (ref 0–5)
SERVICE CMNT-IMP: ABNORMAL
SERVICE CMNT-IMP: ABNORMAL
SERVICE CMNT-IMP: NORMAL
SERVICE CMNT-IMP: NORMAL
SODIUM SERPL-SCNC: 146 MMOL/L (ref 136–145)
SP GR UR REFRACTOMETRY: 1.01 (ref 1–1.03)
TIBC SERPL-MCNC: 283 UG/DL (ref 250–450)
UA: UC IF INDICATED,UAUC: NORMAL
UR CULT HOLD, URHOLD: NORMAL
UROBILINOGEN UR QL STRIP.AUTO: 0.2 EU/DL (ref 0.2–1)
VENTRICULAR RATE, ECG03: 68 BPM
VIT B12 SERPL-MCNC: 1460 PG/ML (ref 193–986)
WBC # BLD AUTO: 10.3 K/UL (ref 4.1–11.1)
WBC URNS QL MICRO: NORMAL /HPF (ref 0–4)

## 2019-06-12 PROCEDURE — 74011250637 HC RX REV CODE- 250/637: Performed by: FAMILY MEDICINE

## 2019-06-12 PROCEDURE — 81001 URINALYSIS AUTO W/SCOPE: CPT

## 2019-06-12 PROCEDURE — 65660000000 HC RM CCU STEPDOWN

## 2019-06-12 PROCEDURE — 36415 COLL VENOUS BLD VENIPUNCTURE: CPT

## 2019-06-12 PROCEDURE — 82962 GLUCOSE BLOOD TEST: CPT

## 2019-06-12 PROCEDURE — 83036 HEMOGLOBIN GLYCOSYLATED A1C: CPT

## 2019-06-12 PROCEDURE — 94760 N-INVAS EAR/PLS OXIMETRY 1: CPT

## 2019-06-12 PROCEDURE — 83540 ASSAY OF IRON: CPT

## 2019-06-12 PROCEDURE — 65390000012 HC CONDITION CODE 44 OBSERVATION

## 2019-06-12 PROCEDURE — 85025 COMPLETE CBC W/AUTO DIFF WBC: CPT

## 2019-06-12 PROCEDURE — 80048 BASIC METABOLIC PNL TOTAL CA: CPT

## 2019-06-12 PROCEDURE — 74011000250 HC RX REV CODE- 250: Performed by: FAMILY MEDICINE

## 2019-06-12 RX ORDER — AMLODIPINE BESYLATE 5 MG/1
10 TABLET ORAL DAILY
Status: DISCONTINUED | OUTPATIENT
Start: 2019-06-13 | End: 2019-06-18

## 2019-06-12 RX ORDER — POTASSIUM CHLORIDE 750 MG/1
10 TABLET, FILM COATED, EXTENDED RELEASE ORAL DAILY
Status: DISCONTINUED | OUTPATIENT
Start: 2019-06-13 | End: 2019-06-22 | Stop reason: HOSPADM

## 2019-06-12 RX ORDER — INSULIN LISPRO 100 [IU]/ML
INJECTION, SOLUTION INTRAVENOUS; SUBCUTANEOUS
Status: DISCONTINUED | OUTPATIENT
Start: 2019-06-12 | End: 2019-06-22 | Stop reason: HOSPADM

## 2019-06-12 RX ORDER — TIMOLOL MALEATE 5 MG/ML
1 SOLUTION/ DROPS OPHTHALMIC 2 TIMES DAILY
Status: DISCONTINUED | OUTPATIENT
Start: 2019-06-12 | End: 2019-06-22 | Stop reason: HOSPADM

## 2019-06-12 RX ORDER — TIMOLOL MALEATE 5 MG/ML
1 SOLUTION/ DROPS OPHTHALMIC 2 TIMES DAILY
Status: DISCONTINUED | OUTPATIENT
Start: 2019-06-12 | End: 2019-06-12

## 2019-06-12 RX ORDER — DEXTROSE 50 % IN WATER (D50W) INTRAVENOUS SYRINGE
25-50 AS NEEDED
Status: DISCONTINUED | OUTPATIENT
Start: 2019-06-12 | End: 2019-06-22 | Stop reason: HOSPADM

## 2019-06-12 RX ORDER — SIMVASTATIN 20 MG/1
20 TABLET, FILM COATED ORAL
Status: DISCONTINUED | OUTPATIENT
Start: 2019-06-12 | End: 2019-06-22 | Stop reason: HOSPADM

## 2019-06-12 RX ORDER — FUROSEMIDE 40 MG/1
40 TABLET ORAL DAILY
Status: DISCONTINUED | OUTPATIENT
Start: 2019-06-13 | End: 2019-06-21

## 2019-06-12 RX ORDER — THERA TABS 400 MCG
1 TAB ORAL DAILY
Status: DISCONTINUED | OUTPATIENT
Start: 2019-06-13 | End: 2019-06-22 | Stop reason: HOSPADM

## 2019-06-12 RX ORDER — MAGNESIUM SULFATE 100 %
4 CRYSTALS MISCELLANEOUS AS NEEDED
Status: DISCONTINUED | OUTPATIENT
Start: 2019-06-12 | End: 2019-06-22 | Stop reason: HOSPADM

## 2019-06-12 RX ADMIN — Medication 10 ML: at 22:00

## 2019-06-12 RX ADMIN — TIMOLOL MALEATE 1 DROP: 5 SOLUTION OPHTHALMIC at 18:00

## 2019-06-12 RX ADMIN — SIMVASTATIN 20 MG: 20 TABLET, FILM COATED ORAL at 22:00

## 2019-06-12 RX ADMIN — Medication 10 ML: at 15:18

## 2019-06-12 RX ADMIN — TIMOLOL MALEATE 1 DROP: 5 SOLUTION OPHTHALMIC at 15:17

## 2019-06-12 RX ADMIN — Medication 10 ML: at 14:00

## 2019-06-12 NOTE — ROUTINE PROCESS
Bedside and Verbal shift change report given to Kaushik Kaufman (oncoming nurse) by Verna Whitt (offgoing nurse). Report included the following information SBAR, Intake/Output, MAR and Cardiac Rhythm NSR.

## 2019-06-12 NOTE — CONSULTS
Cardiology note dictated # [id number]  On the advice of his PCP, Mr Ibeth Bernard presented to the hospital for evaluation of his severe anemia. I am asked to see for cardiac clearance prior to endoscopy. Impressions:  1. Severe aortic stenosis. Echo in 2015 showed SETH 0.88 cm2, mean AV gradient of 41 mmHg, peak aortic valve velocity 4.2 m/s. He has been offered TAVR at every office visit, including his 5/9/19 visit with me, and has always declined. Clinically he has been relatively asymptomatic. ( No HF, syncope or CP). There has been no reason to update his echo since he has had few symptoms and did not want a procedure. 2. CAD: had PCI of his LAD her at [facility] in 2014. Not having any angina  3. 50 # weight loss since 2017  Recommendation:  Will review the echo that has been ordered when it is completed and make a disposition at that point  Discussed with Mr Ibeth Bernard and several family members present at the bedside   Thank you for this referral on this very nice man.   Jordy Bagley MD

## 2019-06-12 NOTE — H&P
H&P dictated. Job# [id number]. FACE TO FACE PATIENT ENCOUNTER PERFORMED ON 6/11/2019 . Addendum:  EXAM CORRECTION   HEART:  Irregular rate, normal rhythm. 3/6 systolic murmur. No rubs or gallops.

## 2019-06-12 NOTE — CONSULTS
[address]    Name:  Reilly Harrell  MR#:  [id number]  :  1929  ACCOUNT #:  [de-identified]  DATE OF SERVICE:  2019      CHIEF COMPLAINT:  Severe iron deficiency anemia    HISTORY OF PRESENT ILLNESS:  An 80-year-old gentleman seen by Dr. Virgilio Heath for iron deficiency anemia and subsequent iron infusions. The hematologic workup was otherwise negative. Since Dr. John Tavarez saw the patient in May, he has had a decrease in hemoglobin and six days ago, the hemoglobin was 6.1. The patient has had some increasing fatigue and tiredness. He denies chest pain or shortness of breath. Denies orthostasis. In addition to this, his  and  states that he has been losing weight for sometime. The patient denies any anorexia. No nausea. No vomiting. No abdominal pain. He denies any change in his bowel habits. There is no melena or hematochezia. Denies any blood in his stool. No fevers, chills, sweats. He complains of chronic left lower extremity edema and stasis dermatitis. He lives alone and independently. He denies smoke or drink. The only NSAID he is taking is 81 mg. He denies any other NSAID use. PAST MEDICAL HISTORY:  Positive for coronary artery disease, aortic stenosis, hypertension, chronic kidney disease, and has progressive anemia. Also has a history of bilateral cataracts, chronic pain, diabetes, hyperlipidemia. PAST SURGICAL HISTORY:  Positive for hernia repair, bilateral hip replacement, history of prostatectomy. MEDICATIONS:  Include amlodipine, aspirin, furosemide, lisinopril, and potassium. He takes one a day vitamin, takes Zocor, timolol ophthalmic solution. ALLERGIES:  NO KNOWN ALLERGIES. IMMUNIZATIONS:  Influenza. SOCIAL HISTORY:  He is a nonsmoker, nondrinker. FAMILY HISTORY:  No knowledge of family history. REVIEW OF SYSTEMS:  Complete 10-system review was performed and is as noted above.   He tends to deny any gross symptoms. PHYSICAL EXAMINATION:  VITAL SIGNS:  Weight is 175. The blood pressure is 136/65, pulse is 70. GENERAL:  Elderly gentleman in no acute distress, appears comfortable. He is incontinent of urine with staining of his pants. He is pale and there is 3/4 peripheral pitting edema. SKIN:  No rash or jaundice. No nodules. HEAD, EYES, EARS, NOSE, AND THROAT:  Sclerae are anicteric. Conjunctivae are pale. Moist mucous membranes. The right eye is clouded over. NECK:  Supple. There is no adenopathy of the neck or the groin. CHEST:  Clear to auscultation and percussion. HEART:  Reveals a regular rate and rhythm with a 3/6 systolic ejection murmur. ABDOMEN:  Obese, soft, nontender. No hepatosplenomegaly. EXTREMITIES:  3/4 pitting edema with chronic stasis dermatitis. IMPRESSION AND PLAN:  1. Severe iron deficiency anemia, etiology is unclear. Rule out occult gastrointestinal bleeding, particularly neoplasia, arteriovenous malformations, or ulcerous disease. There remains a possibility this represents celiac disease. 2.  Coronary artery disease. 3.  Aortic valve disease. 4.  Essential hypertension. 5.  Chronic kidney disease. PLAN:  ED admission. Workup as an inpatient with EGD, colonoscopy, celiac panel, and stools for occult blood.       Carolyn Cronin MD      [id number]  D:  06/11/2019 17:29  T:  06/11/2019 18:50  JOB #:  [id number]

## 2019-06-12 NOTE — PROGRESS NOTES
Patient admitted earlier today, anemia, S/P PRBC transfusion, preparing for  Colonoscopy, and upper GI endoscopy, denies Melena, states no prior colonoscopy,   Anesthesiology evaluated, need cardiac clearance    Visit Vitals  /50 (BP 1 Location: Left arm, BP Patient Position: At rest)   Pulse 80   Temp 97.9 °F (36.6 °C)   Resp 18   Wt 78.6 kg (173 lb 4.5 oz)   SpO2 100%   BMI 27.14 kg/m²

## 2019-06-12 NOTE — PROGRESS NOTES
Admission Medication Reconciliation:    Information obtained from: [facility]     Significant PMH/Disease States:   Past Medical History:   Diagnosis Date    Cataracts, bilateral     Chronic pain     Diabetes ([facility] [address] 2011    Heart failure ([facility] [address]     Hypertension        Chief Complaint for this Admission:  Low hgb    Allergies:  Patient has no known allergies. Prior to Admission Medications:   Prior to Admission Medications   Prescriptions Last Dose Informant Patient Reported? Taking? amLODIPine (NORVASC) 10 mg tablet   Yes Yes   Sig: Take 10 mg by mouth daily. aspirin 81 mg chewable tablet   No Yes   Sig: Take 1 Tab by mouth daily. furosemide (LASIX) 40 mg tablet   Yes Yes   Sig: Take 40 mg by mouth daily. lisinopril (PRINIVIL, ZESTRIL) 20 mg tablet   Yes Yes   Sig: Take 20 mg by mouth daily. multivitamin (ONE A DAY) tablet   Yes Yes   Sig: Take 1 Tab by mouth daily. potassium chloride SA (MICRO-K) 10 mEq capsule   Yes Yes   Sig: Take 10 mEq by mouth daily. simvastatin (ZOCOR) 20 mg tablet   No Yes   Sig: Take 1 Tab by mouth nightly. timolol (TIMOPTIC) 0.5 % ophthalmic solution   No Yes   Sig: Administer 1 Drop to left eye two (2) times a day. Facility-Administered Medications: None         Comments/Recommendations: Medication history completed after verification of medications with [facility] [phone]. Removed K Phos.   All other medications are current     Taty De La Torre PharmD

## 2019-06-12 NOTE — PROGRESS NOTES
[address]   217 [facility] [address]   [phone]                GI PROGRESS NOTE  Leigh Killian, AGACNP-BC  Work Cell: [phone]      NAME:   Adrianne Ramos   :    1929   MRN:    [id number]     Assessment/Plan   1. Severe iron deficiency anemia - unclear etiology, r/o occult GI bleed including ulcer disease, AVMs, neoplasia, etc. Occult stool (-). Hgb improved s/p transfusion. No overt bleeding.   - Clear liquids as tolerated  - Supportive management per primary team  - Continue PPI  - Anesthesia needs cardiac evaluation including ECHO prior to proceeding with endoscopic evaluation   - Plan for EGD/colonoscopy once cardiac clearance obtained  - Monitor H&H, transfuse as needed    2. CAD  3. Aortic valve disease  4. HTN  5. CKD     Patient Active Problem List   Diagnosis Code    Cellulitis and abscess of leg, except foot L03.119, L02.419    Diabetes ([facility] [address] E11.9    Essential hypertension, benign I10    Hip joint replacement     Morbidly obese ([facility] [address] E66.01    Rhabdomyolysis M62.82    Severe aortic stenosis I35.0    Fall W19. Zenobia Oniel    CKD (chronic kidney disease) stage 3, GFR 30-59 ml/min ([facility]) N18.3    Anemia D64.9    Dehydration E86.0    Acute hypernatremia E87.0    Abnormal EKG R94.31       Subjective:     Denies any complaints. Hgb improved s/p transfusion. Objective:     VITALS:   Last 24hrs VS reviewed since prior hospitalist progress note.  Most recent are:  Visit Vitals  /50 (BP 1 Location: Left arm, BP Patient Position: At rest)   Pulse 80   Temp 97.9 °F (36.6 °C)   Resp 18   Wt 78.6 kg (173 lb 4.5 oz)   SpO2 100%   BMI 27.14 kg/m²       Intake/Output Summary (Last 24 hours) at 2019 1259  Last data filed at 2019 1152  Gross per 24 hour   Intake 480 ml   Output 760 ml   Net -280 ml        PHYSICAL EXAM:  General   well developed, elderly, alert, in no acute distress  EENT  Normocephalic, Atraumatic, PERRLA, EOMI, sclera clear  Respiratory   Clear To Auscultation bilaterally - no wheezes, rales, rhonchi, or crackles  Cardiology  Regular Rate and Rythmn  - no murmurs, rubs or gallops  Abdominal  Soft, non-tender, non-distended, positive bowel sounds, no hepatosplenomegaly, no palpable mass  Neurological  No focal neurological deficits noted  Psychological  Oriented x 3. Normal affect. Lab Data   Recent Results (from the past 12 hour(s))   URINALYSIS W/ REFLEX CULTURE    Collection Time: 06/12/19  3:50 AM   Result Value Ref Range    Color YELLOW/STRAW      Appearance CLEAR CLEAR      Specific gravity 1.011 1.003 - 1.030      pH (UA) 5.5 5.0 - 8.0      Protein NEGATIVE  NEG mg/dL    Glucose NEGATIVE  NEG mg/dL    Ketone NEGATIVE  NEG mg/dL    Bilirubin NEGATIVE  NEG      Blood NEGATIVE  NEG      Urobilinogen 0.2 0.2 - 1.0 EU/dL    Nitrites NEGATIVE  NEG      Leukocyte Esterase NEGATIVE  NEG      WBC 0-4 0 - 4 /hpf    RBC 0-5 0 - 5 /hpf    Epithelial cells FEW FEW /lpf    Bacteria NEGATIVE  NEG /hpf    UA:UC IF INDICATED CULTURE NOT INDICATED BY UA RESULT CNI      Hyaline cast 0-2 0 - 5 /lpf   HEMOGLOBIN A1C WITH EAG    Collection Time: 06/12/19  3:50 AM   Result Value Ref Range    Hemoglobin A1c <3.5 (L) 4.2 - 6.3 %    Est. average glucose Cannot be calculated mg/dL   URINE CULTURE HOLD SAMPLE    Collection Time: 06/12/19  3:50 AM   Result Value Ref Range    Urine culture hold        URINE ON HOLD IN MICROBIOLOGY DEPT FOR 3 DAYS. IF UNPRESERVED URINE IS SUBMITTED, IT CANNOT BE USED FOR ADDITIONAL TESTING AFTER 24 HRS, RECOLLECTION WILL BE REQUIRED.    METABOLIC PANEL, BASIC    Collection Time: 06/12/19  3:55 AM   Result Value Ref Range    Sodium 146 (H) 136 - 145 mmol/L    Potassium 3.8 3.5 - 5.1 mmol/L    Chloride 118 (H) 97 - 108 mmol/L    CO2 24 21 - 32 mmol/L    Anion gap 4 (L) 5 - 15 mmol/L    Glucose 90 65 - 100 mg/dL    BUN 22 (H) 6 - 20 MG/DL    Creatinine 1.18 0.70 - 1.30 MG/DL    BUN/Creatinine ratio 19 12 - 20      GFR est AA >60 >60 ml/min/1.73m2    GFR est non-AA 58 (L) >60 ml/min/1.73m2    Calcium 8.2 (L) 8.5 - 10.1 MG/DL   CBC WITH AUTOMATED DIFF    Collection Time: 06/12/19  3:55 AM   Result Value Ref Range    WBC 10.3 4.1 - 11.1 K/uL    RBC 3.01 (L) 4.10 - 5.70 M/uL    HGB 8.2 (L) 12.1 - 17.0 g/dL    HCT 28.3 (L) 36.6 - 50.3 %    MCV 94.0 80.0 - 99.0 FL    MCH 27.2 26.0 - 34.0 PG    MCHC 29.0 (L) 30.0 - 36.5 g/dL    RDW 19.1 (H) 11.5 - 14.5 %    PLATELET 439 695 - 244 K/uL    MPV 9.7 8.9 - 12.9 FL    NRBC 0.0 0  WBC    ABSOLUTE NRBC 0.00 0.00 - 0.01 K/uL    NEUTROPHILS 81 (H) 32 - 75 %    LYMPHOCYTES 7 (L) 12 - 49 %    MONOCYTES 10 5 - 13 %    EOSINOPHILS 1 0 - 7 %    BASOPHILS 0 0 - 1 %    IMMATURE GRANULOCYTES 1 (H) 0.0 - 0.5 %    ABS. NEUTROPHILS 8.3 (H) 1.8 - 8.0 K/UL    ABS. LYMPHOCYTES 0.7 (L) 0.8 - 3.5 K/UL    ABS. MONOCYTES 1.0 0.0 - 1.0 K/UL    ABS. EOSINOPHILS 0.1 0.0 - 0.4 K/UL    ABS. BASOPHILS 0.0 0.0 - 0.1 K/UL    ABS. IMM.  GRANS. 0.1 (H) 0.00 - 0.04 K/UL    DF AUTOMATED     IRON PROFILE    Collection Time: 06/12/19  3:55 AM   Result Value Ref Range    Iron 28 (L) 35 - 150 ug/dL    TIBC 283 250 - 450 ug/dL    Iron % saturation 10 (L) 20 - 50 %   GLUCOSE, POC    Collection Time: 06/12/19  6:39 AM   Result Value Ref Range    Glucose (POC) 89 65 - 100 mg/dL    Performed by Marilou Askew    GLUCOSE, POC    Collection Time: 06/12/19 12:30 PM   Result Value Ref Range    Glucose (POC) 123 (H) 65 - 100 mg/dL    Performed by Ramon Carver          Medications: Reviewed    PMH/SH reviewed - no change compared to H&P  Mid-Level Provider: Chelly Gonzalez NP   Date/Time:  6/12/2019

## 2019-06-12 NOTE — PROGRESS NOTES
Problem: Falls - Risk of  Goal: *Absence of Falls  Description  Document Jensen Krishnan Fall Risk and appropriate interventions in the flowsheet.   Outcome: Progressing Towards Goal     Problem: Patient Education: Go to Patient Education Activity  Goal: Patient/Family Education  Outcome: Progressing Towards Goal     Problem: Anemia Care Plan (Adult and Pediatric)  Goal: *Labs within defined limits  Outcome: Progressing Towards Goal  Goal: *Tolerates increased activity  Outcome: Progressing Towards Goal     Problem: Patient Education: Go to Patient Education Activity  Goal: Patient/Family Education  Outcome: Progressing Towards Goal     Problem: Patient Education: Go to Patient Education Activity  Goal: Patient/Family Education  Outcome: Progressing Towards Goal     Problem: Heart Failure: Day 1  Goal: Off Pathway (Use only if patient is Off Pathway)  Outcome: Progressing Towards Goal  Goal: Activity/Safety  Outcome: Progressing Towards Goal  Goal: Consults, if ordered  Outcome: Progressing Towards Goal  Goal: Diagnostic Test/Procedures  Outcome: Progressing Towards Goal  Goal: Nutrition/Diet  Outcome: Progressing Towards Goal  Goal: Discharge Planning  Outcome: Progressing Towards Goal  Goal: Medications  Outcome: Progressing Towards Goal  Goal: Respiratory  Outcome: Progressing Towards Goal  Goal: Treatments/Interventions/Procedures  Outcome: Progressing Towards Goal  Goal: Psychosocial  Outcome: Progressing Towards Goal  Goal: *Oxygen saturation within defined limits  Outcome: Progressing Towards Goal  Goal: *Hemodynamically stable  Outcome: Progressing Towards Goal  Goal: *Optimal pain control at patient's stated goal  Outcome: Progressing Towards Goal  Goal: *Anxiety reduced or absent  Outcome: Progressing Towards Goal

## 2019-06-12 NOTE — H&P
[address]  HISTORY AND PHYSICAL    Name:  Donna Skiff  MR#:  [id number]  :  1929  ACCOUNT #:  [de-identified]  ADMIT DATE:  2019      CHIEF COMPLAINT:  The patient does not provide. HISTORY OF PRESENT ILLNESS:  An 80-year-old -American male with past medical history of type 2 diabetes mellitus, CHF, hypertension, chronic pain, cataract presented to Emergency Department from home with no chief complaint. The patient unfortunately is a poor historian. Majority of the history was obtained from review of ED and electronic medical records. Per their collective reports, the patient notably has been referred from his gastroenterologist - Dr. Amalia Smith office. The patient notably was anemic with hemoglobin of 6.1 on 2019. On arrival to the emergency department, initially recorded vital signs; blood pressure 159/60, heart rate 61, respirations 16, O2 saturation 98% on room air. The workup included hemoglobin at 6.6. ED then consulted hospitalist for further evaluation and treatment. In the interim, blood transfusion had already been started. The patient otherwise has no complaints. He does not complain of any dizziness, lightheadedness, new-onset focal weakness, numbness, paresthesias, slurred speech, facial droop, headache, neck pain, back pain, chest pain, shortness of breath, cough, congestion, abdominal pain, nausea, vomiting, diarrhea, melena, dysuria, hematuria, fever, chills, or rash. PAST MEDICAL HISTORY:  1.  Bilateral cataracts. 2.  Chronic pain. 3.  Type 2 diabetes mellitus. 4.  CHF. 5.  Hypertension. 6.  Anemia. PAST SURGICAL HISTORY:  1. Hernia repair, unspecified. 2.  Bilateral total hip replacement. 3.  Prostatectomy. MEDICATIONS:  Medication list reviewed and noted on chart records. Taking Last Dose Start Date End Date Provider     amLODIPine (NORVASC) 10 mg tablet    --  -- Chris Reid MD    Take 10 mg by mouth daily. aspirin 81 mg chewable tablet    12/21/15  -- Kaylee Robbins MD    Take 1 Tab by mouth daily. furosemide (LASIX) 40 mg tablet    --  --  Provider, Historical    Take 40 mg by mouth daily. lisinopril (PRINIVIL, ZESTRIL) 20 mg tablet    --  --  Chris Alexander MD    Take 20 mg by mouth daily. multivitamin (ONE A DAY) tablet    --  --  Provider, Historical    Take 1 Tab by mouth daily. potassium chloride SA (MICRO-K) 10 mEq capsule    --  --  Provider, Historical    Take 10 mEq by mouth daily. simvastatin (ZOCOR) 20 mg tablet    12/21/15  -- Kaylee Robbins MD    Take 1 Tab by mouth nightly. timolol (TIMOPTIC) 0.5 % ophthalmic solution    12/21/15  -- Kaylee Robbins MD    Administer 1 Drop to left eye two (2) times a day. ALLERGIES:  NO KNOWN DRUG ALLERGIES. SOCIAL HISTORY:  No reports of smoking, alcohol, or illicit drugs. FAMILY HISTORY:  Unknown with regard to heart attacks or strokes. REVIEW OF SYSTEMS:  Pertinent positives were as noted in the HPI, otherwise negative. PHYSICAL EXAMINATION:  VITAL SIGNS:  Temperature 97.8 degrees Fahrenheit, blood pressure 135/60, heart rate 57, respiratory rate of 14, O2 saturation 100% on room air. GENERAL:  Elderly male in no acute respiratory distress. PSYCH:  The patient was awake, alert, and oriented x3. NEUROLOGIC:  GCS 15. Moves extremities x4. Generalized weakness. Sensation grossly intact without slurred speech or facial droop. HEENT:  Normocephalic, atraumatic. PERRLA. EOMs intact. Sclerae are anicteric. Conjunctivae clear. Nares are patent. Oropharynx clear. Tongue is midline, non-edematous. NECK:  Supple without lymphadenopathy, JVD, carotid bruits, or thyromegaly. LYMPH:  Negative for cervical or supraclavicular adenopathy. RESPIRATORY:  Lungs are clear to auscultation bilaterally. CVS:  Heart regular rate and rhythm. Normal S1, S2 without murmurs, rubs, or gallops. GI:  Abdomen soft, nontender, and nondistended. Normoactive bowel sounds. No rebound, guarding, or rigidity. No auscultated abdominal bruits. No palpable abdominal mass. :  Positive for scrotal inguinal hernia. BACK:  No CVA tenderness. No step-off deformity. MUSCULOSKELETAL:  No acute palpable bony deformity. Bilateral marked nonpitting edema of lower extremities with open wound to the posterior aspect of the left leg. VASCULAR:  2+ radial and 1+ dorsalis pedis pulses without cyanosis or clubbing. Nonpitting edema to the lower extremities. SKIN:  Warm and dry with chronic venous stasis discoloration to both legs. RECTAL:  Examination was performed. Stool occult blood test sent. LABORATORY DATA:  Labs are reviewed as follows:  Sodium 146, potassium 4.3, chloride 116, CO2 of 26, BUN 26, creatinine 1.40, glucose of 90, anion gap of 4, calcium of 8.6. GFR 58. Total bilirubin 0.3, total protein 7.3, albumin is 3.0, ALT 12, AST of 14, alkaline phosphatase 81. Troponin I less than 0.05. WBC of 9.2, hemoglobin of 6.6, hematocrit 23.3, platelets of 667, and neutrophils of 73%. A 12-lead EKG, undetermined rhythm at 68 beats per minute. IMPRESSION AND PLAN:  1. Anemia - acute on chronic. Transfuse 1 unit of packed red blood cells. Admit to telemetry. Stool occult blood test was performed, results are reportedly negative. Check iron profile, B12, folate levels. Repeat CBC post blood transfusion. Transfuse additional units in order to maintain hemoglobin greater than 7.0. Monitor vital signs. The patient reportedly had received B12 infusions in the past.  2.  Acute hypernatremia. Repeat sodium levels in a.m. 3.  Dehydration. We will continue with packed red blood cell transfusion by gentle IV fluids. Repeat renal panel. Also check UA. 4.  Generalized weakness and debility. Place on fall precautions. 5.  Type 2 diabetes mellitus. Place on Humalog insulin correction coverage schedule, Accu-Chek, and check hemoglobin A1c level.   6. Hypertension. Resume back on home medications. 7.  Hyperlipidemia. Continue on simvastatin. 8.  Venous thromboembolism prophylaxis. Sequential compression devices to lower extremities.       Ami Blevins MD [email or web address]  D:  06/12/2019 3:24  T:  06/12/2019 4:43  JOB #:  [id number]

## 2019-06-13 LAB
ABO + RH BLD: NORMAL
ALBUMIN SERPL-MCNC: 2.4 G/DL (ref 3.5–5)
ALBUMIN/GLOB SERPL: 0.6 {RATIO} (ref 1.1–2.2)
ALP SERPL-CCNC: 65 U/L (ref 45–117)
ALT SERPL-CCNC: 10 U/L (ref 12–78)
ANION GAP SERPL CALC-SCNC: 6 MMOL/L (ref 5–15)
AST SERPL-CCNC: 12 U/L (ref 15–37)
BASOPHILS # BLD: 0 K/UL (ref 0–0.1)
BASOPHILS NFR BLD: 0 % (ref 0–1)
BILIRUB SERPL-MCNC: 0.6 MG/DL (ref 0.2–1)
BLD PROD TYP BPU: NORMAL
BLD PROD TYP BPU: NORMAL
BLOOD GROUP ANTIBODIES SERPL: NORMAL
BPU ID: NORMAL
BPU ID: NORMAL
BUN SERPL-MCNC: 20 MG/DL (ref 6–20)
BUN/CREAT SERPL: 16 (ref 12–20)
CALCIUM SERPL-MCNC: 8.4 MG/DL (ref 8.5–10.1)
CHLORIDE SERPL-SCNC: 116 MMOL/L (ref 97–108)
CO2 SERPL-SCNC: 25 MMOL/L (ref 21–32)
CREAT SERPL-MCNC: 1.25 MG/DL (ref 0.7–1.3)
CROSSMATCH RESULT,%XM: NORMAL
CROSSMATCH RESULT,%XM: NORMAL
DIFFERENTIAL METHOD BLD: ABNORMAL
EOSINOPHIL # BLD: 0.2 K/UL (ref 0–0.4)
EOSINOPHIL NFR BLD: 2 % (ref 0–7)
ERYTHROCYTE [DISTWIDTH] IN BLOOD BY AUTOMATED COUNT: 20.1 % (ref 11.5–14.5)
GLOBULIN SER CALC-MCNC: 4.3 G/DL (ref 2–4)
GLUCOSE BLD STRIP.AUTO-MCNC: 64 MG/DL (ref 65–100)
GLUCOSE BLD STRIP.AUTO-MCNC: 74 MG/DL (ref 65–100)
GLUCOSE BLD STRIP.AUTO-MCNC: 80 MG/DL (ref 65–100)
GLUCOSE BLD STRIP.AUTO-MCNC: 91 MG/DL (ref 65–100)
GLUCOSE BLD STRIP.AUTO-MCNC: 95 MG/DL (ref 65–100)
GLUCOSE BLD STRIP.AUTO-MCNC: 98 MG/DL (ref 65–100)
GLUCOSE SERPL-MCNC: 73 MG/DL (ref 65–100)
HCT VFR BLD AUTO: 27.3 % (ref 36.6–50.3)
HGB BLD-MCNC: 8.2 G/DL (ref 12.1–17)
IMM GRANULOCYTES # BLD AUTO: 0.1 K/UL (ref 0–0.04)
IMM GRANULOCYTES NFR BLD AUTO: 1 % (ref 0–0.5)
LYMPHOCYTES # BLD: 1.2 K/UL (ref 0.8–3.5)
LYMPHOCYTES NFR BLD: 13 % (ref 12–49)
MCH RBC QN AUTO: 28 PG (ref 26–34)
MCHC RBC AUTO-ENTMCNC: 30 G/DL (ref 30–36.5)
MCV RBC AUTO: 93.2 FL (ref 80–99)
MONOCYTES # BLD: 1 K/UL (ref 0–1)
MONOCYTES NFR BLD: 10 % (ref 5–13)
NEUTS SEG # BLD: 7.1 K/UL (ref 1.8–8)
NEUTS SEG NFR BLD: 74 % (ref 32–75)
NRBC # BLD: 0 K/UL (ref 0–0.01)
NRBC BLD-RTO: 0 PER 100 WBC
PLATELET # BLD AUTO: 329 K/UL (ref 150–400)
PMV BLD AUTO: 9.8 FL (ref 8.9–12.9)
POTASSIUM SERPL-SCNC: 3.9 MMOL/L (ref 3.5–5.1)
PROT SERPL-MCNC: 6.7 G/DL (ref 6.4–8.2)
RBC # BLD AUTO: 2.93 M/UL (ref 4.1–5.7)
RBC MORPH BLD: ABNORMAL
SERVICE CMNT-IMP: ABNORMAL
SERVICE CMNT-IMP: NORMAL
SODIUM SERPL-SCNC: 147 MMOL/L (ref 136–145)
SPECIMEN EXP DATE BLD: NORMAL
STATUS OF UNIT,%ST: NORMAL
STATUS OF UNIT,%ST: NORMAL
UNIT DIVISION, %UDIV: 0
UNIT DIVISION, %UDIV: 0
WBC # BLD AUTO: 9.6 K/UL (ref 4.1–11.1)

## 2019-06-13 PROCEDURE — 82962 GLUCOSE BLOOD TEST: CPT

## 2019-06-13 PROCEDURE — 74011250637 HC RX REV CODE- 250/637: Performed by: FAMILY MEDICINE

## 2019-06-13 PROCEDURE — 80053 COMPREHEN METABOLIC PANEL: CPT

## 2019-06-13 PROCEDURE — 65660000000 HC RM CCU STEPDOWN

## 2019-06-13 PROCEDURE — 85025 COMPLETE CBC W/AUTO DIFF WBC: CPT

## 2019-06-13 PROCEDURE — 36415 COLL VENOUS BLD VENIPUNCTURE: CPT

## 2019-06-13 PROCEDURE — 65390000012 HC CONDITION CODE 44 OBSERVATION

## 2019-06-13 RX ADMIN — TIMOLOL MALEATE 1 DROP: 5 SOLUTION OPHTHALMIC at 20:00

## 2019-06-13 RX ADMIN — TIMOLOL MALEATE 1 DROP: 5 SOLUTION OPHTHALMIC at 09:25

## 2019-06-13 RX ADMIN — POTASSIUM CHLORIDE 10 MEQ: 750 TABLET, FILM COATED, EXTENDED RELEASE ORAL at 09:24

## 2019-06-13 RX ADMIN — THERA TABS 1 TABLET: TAB at 09:24

## 2019-06-13 RX ADMIN — AMLODIPINE BESYLATE 10 MG: 5 TABLET ORAL at 09:24

## 2019-06-13 RX ADMIN — Medication 10 ML: at 14:00

## 2019-06-13 RX ADMIN — SIMVASTATIN 20 MG: 20 TABLET, FILM COATED ORAL at 23:24

## 2019-06-13 RX ADMIN — Medication 10 ML: at 23:24

## 2019-06-13 RX ADMIN — FUROSEMIDE 40 MG: 40 TABLET ORAL at 09:24

## 2019-06-13 RX ADMIN — Medication 10 ML: at 17:28

## 2019-06-13 NOTE — PROGRESS NOTES
Hospitalist Progress Note    NAME: Adelaide Goldberg   :  1929   MRN:  [id number]       Assessment / Plan:    IMPRESSION AND PLAN:  1. Anemia - acute on chronic. Transfused 1 unit of packed red blood cells. Admit to telemetry. Stool occult blood test was performed, results are reportedly negative. Check iron profile, B12, folate levels. Hb stable 8.2, need cardiology clearance prior to Endoscopy. .2  Acute hypernatremia. Na 147 .  3. Dehydration. resolved. 4.  Generalized weakness and debility. Place on fall precautions. 5.  Type 2 diabetes mellitus. Place on Humalog insulin correction coverage schedule, Accu-Chek, and check hemoglobin A1c level. 6.  Hypertension. Resume back on home medications. 7.  Hyperlipidemia. Continue on simvastatin. 8.  Venous thromboembolism prophylaxis. Sequential compression devices to lower extremities.                Subjective:     Chief Complaint / Reason for Physician Visit  \"feeling ok, no chest pain, no abdominal pain \". Discussed with RN events overnight. Review of Systems:  Symptom Y/N Comments  Symptom Y/N Comments   Fever/Chills    Chest Pain     Poor Appetite    Edema     Cough    Abdominal Pain     Sputum    Joint Pain     SOB/JOSUE    Pruritis/Rash     Nausea/vomit    Tolerating PT/OT     Diarrhea    Tolerating Diet     Constipation    Other       Could NOT obtain due to:      Objective:     VITALS:   Last 24hrs VS reviewed since prior progress note.  Most recent are:  Patient Vitals for the past 24 hrs:   Temp Pulse Resp BP SpO2   19 1210 97.4 °F (36.3 °C) 71 18 134/54 97 %   19 0941 97.7 °F (36.5 °C) 64 19 148/66 99 %   19 0450 97.5 °F (36.4 °C) 76 16 115/44 100 %   19 0041 97.9 °F (36.6 °C) 75 16 123/50 100 %   19 1930 97.6 °F (36.4 °C) 74 16 124/43 100 %       Intake/Output Summary (Last 24 hours) at 2019 1538  Last data filed at 2019 1218  Gross per 24 hour   Intake    Output 1050 ml   Net -1050 ml PHYSICAL EXAM:  General: WD, WN. Alert, cooperative, no acute distress    EENT:  EOMI. Anicteric sclerae. MMM  Resp:  CTA bilaterally, no wheezing or rales. No accessory muscle use  CV:  Regular  rhythm,  No edema systolic murmur   GI:  Soft, Non distended, Non tender.  +Bowel sounds  Neurologic:  Alert and oriented X 3, normal speech,   Psych:   Good insight. Not anxious nor agitated  Skin:  No rashes. No jaundice    Reviewed most current lab test results and cultures  YES  Reviewed most current radiology test results   YES  Review and summation of old records today    NO  Reviewed patient's current orders and MAR    YES  PMH/SH reviewed - no change compared to H&P  ________________________________________________________________________  Care Plan discussed with:    Comments   Patient     Family      RN     Care Manager     Consultant                        Multidiciplinary team rounds were held today with , nursing, pharmacist and clinical coordinator. Patient's plan of care was discussed; medications were reviewed and discharge planning was addressed. ________________________________________________________________________  Total NON critical care TIME:  35 Minutes    Total CRITICAL CARE TIME Spent:   Minutes non procedure based      Comments   >50% of visit spent in counseling and coordination of care     ________________________________________________________________________  Honey Gotti MD     Procedures: see electronic medical records for all procedures/Xrays and details which were not copied into this note but were reviewed prior to creation of Plan. LABS:  I reviewed today's most current labs and imaging studies.   Pertinent labs include:  Recent Labs     06/13/19  0511 06/12/19  0355 06/11/19 2014   WBC 9.6 10.3 9.2   HGB 8.2* 8.2* 6.6*   HCT 27.3* 28.3* 23.3*    327 343     Recent Labs     06/13/19  0511 06/12/19  0355 06/11/19  1823   * 146* 146*   K 3.9 3.8 4.3   * 118* 116*   CO2 25 24 26   GLU 73 90 90   BUN 20 22* 26*   CREA 1.25 1.18 1.40*   CA 8.4* 8.2* 8.6   ALB 2.4*  --  3.0*   TBILI 0.6  --  0.3   SGOT 12*  --  14*   ALT 10*  --  12       Signed: Jacques Seth MD

## 2019-06-13 NOTE — CONSULTS
[address]    Name:  Bryan Zuñiga  MR#:  [id number]  :  1929  ACCOUNT #:  [de-identified]  DATE OF SERVICE:  2019      CARDIOLOGY CONSULTATION    REFERRING PHYSICIAN:  Yvonne Bird MD.    HISTORY OF PRESENT ILLNESS:  The patient is an 49-year-old gentleman well known to me for the past several years who presented to the hospital on the advice of his physician for evaluation of his severe anemia. He apparently has not had any blood loss that he can see and his occult blood tests have been negative. His hemoglobin at the time of presentation was 6.1 with  normochromic, normocytic indices. The patient denies any abdominal pain, back pain, any chest pain, no shortness of breath and has not seen any blood when he urinates or coughs. He has had an unplanned weight loss over the past two years of over 50 pounds since 2017. His appetite is fair. Several family members were at the bedside during today's exam and earlier. MEDICATIONS PRIOR TO ADMISSION:  The patient's meds at home prior to admission were as follows:  Aspirin 81 mg daily, lisinopril 20 mg daily, multivitamins one tab daily, simvastatin 20 mg nightly, Xalatan eye drops, amlodipine 10 mg, furosemide 40 mg daily, potassium 10 mEq daily. ALLERGIES:  NO KNOWN ALLERGIES. REVIEW OF SYSTEMS:  General review of systems is otherwise unremarkable. PHYSICAL EXAMINATION:  GENERAL:  This is a well-developed very pleasant elderly man. He is alert and aware, oriented to person, place, and time. VITAL SIGNS:  As follows:  Temperature 98, respirations 18, blood pressure 147/53, sats 98% on room air. HEENT:  Unremarkable. NECK:  Supple. No adenopathy. No neck vein distention. No hepatojugular reflux. Thyroid is not enlarged. Trachea midline. CHEST WALL:  Unremarkable. LUNGS:  Clear to auscultation and percussion. HEART:  Regular, moderate rhythm.   Musical systolic murmur all over the precordium, most prominently at the second right and left intercostal spaces and sternal borders. No thrills, lifts, or heaves. ABDOMEN:  Soft and nontender. No bruits. No ascites. NEUROLOGIC EXAM:  The patient is awake, alert, and appropriate. No focal motors signs. EXTREMITIES:  Grossly edematous and had the appearance of elephantiasis. Pedal pulses are present. Feet are warm. His EKG demonstrates sinus rhythm with sinus bradycardia and sinus arrhythmia. LABORATORY DATA:  His labs, chemistries were unremarkable other than for increase in his chlorides. BUN is 22, creatinine 1. 18. The patient's last echocardiogram was in 2015 here at [facility] and demonstrated severe aortic stenosis with the following parameters, aortic valve area 0.88 cm2, peak velocity is 4.2 meters per second, with mean aortic valve gradient of 41 mmHg. His ejection fraction has always been well preserved and he does not have signs and symptoms of decompensated congestive heart failure. In general, he is also maintaining sinus rhythm. He has always declined transcatheter aortic valve replacement or open surgery for his valve and has always understood the consequences of not having this done. I suspect his valve is now critically stenosed. He will need a complete R/L heart cath with coronary angiography. I will see the patient tomorrow. Further recommendations to follow.     Thank you for this referral.      Phani Lopez MD [email or web address]  D:  06/12/2019 19:16  T:  06/13/2019 0:02  JOB #:  [id number]

## 2019-06-13 NOTE — PROGRESS NOTES
[address]  [address] [facility] [address]   [phone]                GI PROGRESS NOTE  [facility]  Work Cell: [phone]      NAME:   Cain Burns   :    1929   MRN:    [id number]     Assessment/Plan   1. Severe iron deficiency anemia - unclear etiology, r/o occult GI bleed including ulcer disease, AVMs, neoplasia, etc. Occult stool (-). Hgb stable. No overt bleeding.   - Clear liquids as tolerated  - Supportive management per primary team  - Continue PPI  - Plan for EGD/colonoscopy once cleared by cardiology   - Monitor H&H, transfuse as needed     2. CAD  3. Aortic valve disease  4. HTN  5. CKD     Patient Active Problem List   Diagnosis Code    Cellulitis and abscess of leg, except foot L03.119, L02.419    Diabetes ([facility] E11.9    Essential hypertension, benign I10    Hip joint replacement     Morbidly obese ([facility] [facility] E66.01    Rhabdomyolysis M62.82    Severe aortic stenosis I35.0    Fall W19. Bradford Player    CKD (chronic kidney disease) stage 3, GFR 30-59 ml/min ([facility]) N18.3    Anemia D64.9    Dehydration E86.0    Acute hypernatremia E87.0    Abnormal EKG R94.31       Subjective:     Denies any complaints. Hgb stable. No evidence of bleeding. Objective:     VITALS:   Last 24hrs VS reviewed since prior hospitalist progress note.  Most recent are:  Visit Vitals  /44 (BP 1 Location: Left arm, BP Patient Position: At rest)   Pulse 76   Temp 97.5 °F (36.4 °C)   Resp 16   Wt 75.3 kg (166 lb 0.1 oz)   SpO2 100%   BMI 26.00 kg/m²       Intake/Output Summary (Last 24 hours) at 2019 0837  Last data filed at 2019 0451  Gross per 24 hour   Intake 240 ml   Output 650 ml   Net -410 ml        PHYSICAL EXAM:  General   well developed, elderly, alert, in no acute distress  EENT  Normocephalic, Atraumatic, PERRLA, EOMI, sclera clear  Respiratory   Clear To Auscultation bilaterally - no wheezes, rales, rhonchi, or crackles  Cardiology  Regular Rate and Rythmn  - (+) loud murmur, no rubs or gallops  Abdominal  Soft, non-tender, non-distended, positive bowel sounds, no hepatosplenomegaly, no palpable mass  Neurological  No focal neurological deficits noted  Psychological  Oriented x 3. Normal affect. Lab Data   Recent Results (from the past 12 hour(s))   GLUCOSE, POC    Collection Time: 06/12/19 10:10 PM   Result Value Ref Range    Glucose (POC) 99 65 - 100 mg/dL    Performed by [address] [place] [address]    CBC WITH AUTOMATED DIFF    Collection Time: 06/13/19  5:11 AM   Result Value Ref Range    WBC 9.6 4.1 - 11.1 K/uL    RBC 2.93 (L) 4.10 - 5.70 M/uL    HGB 8.2 (L) 12.1 - 17.0 g/dL    HCT 27.3 (L) 36.6 - 50.3 %    MCV 93.2 80.0 - 99.0 FL    MCH 28.0 26.0 - 34.0 PG    MCHC 30.0 30.0 - 36.5 g/dL    RDW 20.1 (H) 11.5 - 14.5 %    PLATELET 187 210 - 042 K/uL    MPV 9.8 8.9 - 12.9 FL    NRBC 0.0 0  WBC    ABSOLUTE NRBC 0.00 0.00 - 0.01 K/uL    NEUTROPHILS 74 32 - 75 %    LYMPHOCYTES 13 12 - 49 %    MONOCYTES 10 5 - 13 %    EOSINOPHILS 2 0 - 7 %    BASOPHILS 0 0 - 1 %    IMMATURE GRANULOCYTES 1 (H) 0.0 - 0.5 %    ABS. NEUTROPHILS 7.1 1.8 - 8.0 K/UL    ABS. LYMPHOCYTES 1.2 0.8 - 3.5 K/UL    ABS. MONOCYTES 1.0 0.0 - 1.0 K/UL    ABS. EOSINOPHILS 0.2 0.0 - 0.4 K/UL    ABS. BASOPHILS 0.0 0.0 - 0.1 K/UL    ABS. IMM.  GRANS. 0.1 (H) 0.00 - 0.04 K/UL    DF SMEAR SCANNED      RBC COMMENTS ANISOCYTOSIS  2+        RBC COMMENTS OVALOCYTES  PRESENT        RBC COMMENTS CHAU CELLS  PRESENT        RBC COMMENTS POIKILOCYTOSIS  PRESENT        RBC COMMENTS POLYCHROMASIA  PRESENT       METABOLIC PANEL, COMPREHENSIVE    Collection Time: 06/13/19  5:11 AM   Result Value Ref Range    Sodium 147 (H) 136 - 145 mmol/L    Potassium 3.9 3.5 - 5.1 mmol/L    Chloride 116 (H) 97 - 108 mmol/L    CO2 25 21 - 32 mmol/L    Anion gap 6 5 - 15 mmol/L    Glucose 73 65 - 100 mg/dL    BUN 20 6 - 20 MG/DL    Creatinine 1.25 0.70 - 1.30 MG/DL    BUN/Creatinine ratio 16 12 - 20      GFR est AA >60 >60 ml/min/1.73m2    GFR est non-AA 54 (L) >60 ml/min/1.73m2    Calcium 8.4 (L) 8.5 - 10.1 MG/DL    Bilirubin, total 0.6 0.2 - 1.0 MG/DL    ALT (SGPT) 10 (L) 12 - 78 U/L    AST (SGOT) 12 (L) 15 - 37 U/L    Alk.  phosphatase 65 45 - 117 U/L    Protein, total 6.7 6.4 - 8.2 g/dL    Albumin 2.4 (L) 3.5 - 5.0 g/dL    Globulin 4.3 (H) 2.0 - 4.0 g/dL    A-G Ratio 0.6 (L) 1.1 - 2.2     GLUCOSE, POC    Collection Time: 06/13/19  5:55 AM   Result Value Ref Range    Glucose (POC) 64 (L) 65 - 100 mg/dL    Performed by Elke, POC    Collection Time: 06/13/19  6:12 AM   Result Value Ref Range    Glucose (POC) 74 65 - 100 mg/dL    Performed by Elke, POC    Collection Time: 06/13/19  6:48 AM   Result Value Ref Range    Glucose (POC) 98 65 - 100 mg/dL    Performed by Richy Long          Medications: Reviewed    PMH/SH reviewed - no change compared to H&P  Mid-Level Provider: Zohaib Be NP   Date/Time:  6/13/2019

## 2019-06-13 NOTE — PROGRESS NOTES
Cardiology Progress Note  2019     Admit Date: 2019  Admit Diagnosis: Anemia [D64.9]  Dehydration [E86.0]  Acute hypernatremia [E87.0]  Abnormal EKG [R94.31]  CC: none currently    Assessment/Plan:     Waiting for echo to be done to make a disposition/risk stratification on Mr Tano Mendes  He has been stable today     For other plans, see orders. Subjective: Andrea Fu reports   Chest Pain:  [x]  none;  consistent with []  non-cardiac  []  atypical  []  angina             [x]  none now    []  on-going  Dyspnea: [x]  none    []  at rest    []  with exertion   []  improved    []  unchanged    []  worsening  PND:       [x]  none      []  overnight      Orthopnea:   [x]  none        []  improved         []  unchanged        []  worsening  Presyncope: [x]  none        []  improved         []  unchanged        []  worsening  Ambulated in hallway without symptoms  []  Yes  Ambulated in room without symptoms  []  Yes    Objective:    Physical Exam:  Overall VSSAF;    Visit Vitals  /61 (BP 1 Location: Left arm, BP Patient Position: At rest)   Pulse 68   Temp 97.8 °F (36.6 °C)   Resp 18   Wt 75.3 kg (166 lb 0.1 oz)   SpO2 100%   BMI 26.00 kg/m²     Temp (24hrs), Av.7 °F (36.5 °C), Min:97.4 °F (36.3 °C), Max:97.9 °F (36.6 °C)    Patient Vitals for the past 8 hrs:   Pulse   19 1602 68   19 1210 71    Patient Vitals for the past 8 hrs:   Resp   19 1602 18   19 1210 18    Patient Vitals for the past 8 hrs:   BP   19 1602 138/61   19 1210 134/54          Intake/Output Summary (Last 24 hours) at 2019 1845  Last data filed at 2019 1603  Gross per 24 hour   Intake    Output 1400 ml   Net -1400 ml       General Appearance: Well developed, well nourished, no acute distress. Ears/Nose/Mouth/Throat:   Normal MM; anicteric. JVP: WNL   Resp:   Lungs clear to auscultation bilaterally. Nl resp effort. Cardiovascular:  RRR, S1, S2 normal, no new murmur.  No gallop or rub. Abdomen:   Soft, non-tender, bowel sounds are present. Extremities: No edema bilaterally. Skin:  Neuro: Warm and dry. A/O x3, grossly nonfocal    []  cath site intact w/o hematoma or bruit; distal pulse unchanged. Data Review:     Telemetry independently reviewed : [x]  sinus      []  chronic afib     []  par afib    []  NSVT    ECG independently reviewed:  [x]  NSR         []  no significant changes  [] no new ECG provided for review  Lab results reviewed as noted below. Current medications reviewed as noted below. No results for input(s): PH, PCO2, PO2 in the last 72 hours. Recent Labs     06/11/19 1823   TROIQ <0.05     Recent Labs     06/13/19  0511 06/12/19 0355 06/11/19 2014 06/11/19 1823   * 146*  --  146*   K 3.9 3.8  --  4.3   * 118*  --  116*   CO2 25 24  --  26   BUN 20 22*  --  26*   CREA 1.25 1.18  --  1.40*   GLU 73 90  --  90   CA 8.4* 8.2*  --  8.6   ALB 2.4*  --   --  3.0*   WBC 9.6 10.3 9.2  --    HGB 8.2* 8.2* 6.6*  --    HCT 27.3* 28.3* 23.3*  --     327 343  --      Recent Labs     06/13/19 0511 06/11/19 1823   SGOT 12* 14*   ALT 10* 12   AP 65 81   TBILI 0.6 0.3   TP 6.7 7.3   ALB 2.4* 3.0*   GLOB 4.3* 4.3*     No results for input(s): INR, PTP, APTT in the last 72 hours.     No lab exists for component: INREXT   Recent Labs     06/12/19  0355   TIBC 283   PSAT 10*      Lab Results   Component Value Date/Time    Glucose (POC) 91 06/13/2019 05:05 PM    Glucose (POC) 80 06/13/2019 12:11 PM    Glucose (POC) 98 06/13/2019 06:48 AM    Glucose (POC) 74 06/13/2019 06:12 AM    Glucose (POC) 64 (L) 06/13/2019 05:55 AM       Current Facility-Administered Medications   Medication Dose Route Frequency    glucose chewable tablet 16 g  4 Tab Oral PRN    dextrose (D50W) injection syrg 12.5-25 g  25-50 mL IntraVENous PRN    glucagon (GLUCAGEN) injection 1 mg  1 mg IntraMUSCular PRN    insulin lispro (HUMALOG) injection   SubCUTAneous AC&HS    timolol (TIMOPTIC) 0.5 % ophthalmic solution 1 Drop  1 Drop Left Eye BID    simvastatin (ZOCOR) tablet 20 mg  20 mg Oral QHS    potassium chloride SR (KLOR-CON 10) tablet 10 mEq  10 mEq Oral DAILY    furosemide (LASIX) tablet 40 mg  40 mg Oral DAILY    amLODIPine (NORVASC) tablet 10 mg  10 mg Oral DAILY    therapeutic multivitamin (THERAGRAN) tablet 1 Tab  1 Tab Oral DAILY    0.9% sodium chloride infusion 250 mL  250 mL IntraVENous PRN    0.9% sodium chloride infusion 250 mL  250 mL IntraVENous PRN    sodium chloride (NS) flush 5-40 mL  5-40 mL IntraVENous Q8H    sodium chloride (NS) flush 5-40 mL  5-40 mL IntraVENous PRN        Lizette Reilly MD

## 2019-06-13 NOTE — PROGRESS NOTES
BG 64. Patient assymptomatic. Given 4 oz. Apple juice. Repeat BG 74, 4 more ounces of apple juice given. Repeat BG 86.

## 2019-06-13 NOTE — CDMP QUERY
#1 
 
 
Pt admitted with anemia  and has history of heart failure noted documented. Please further specify type and acuity of heart failure in the medical record. ? Acute on Chronic Systolic CHF ? Acute on Chronic Diastolic CHF ? Acute on Chronic Systolic and Diastolic CHF ? Acute Systolic CHF ? Acute Diastolic CHF ? Acute Systolic and Diastolic CHF ? Chronic Systolic CHF ? Chronic Diastolic CHF ? Chronic Systolic and Diastolic CHF 
? Other, please specify ? Clinically unable to determine The medical record reflects the following: 
  Risk Factors: HX of heart failure Clinical Indicators:  
ED note-past medical history significant for HTN, DM, heart failure, and chronic pain EKG-with sinus arrhythmia When compared with ECG of 19-DEC-2015 02:38,  
Current undetermined rhythm precludes rhythm comparison, needs review Nonspecific T wave abnormality, improved in Anterolateral leads 2015 echo- 
Left ventricle: Systolic function was normal. Ejection fraction was 
estimated in the range of 65 % to 70 %. There were no regional wall motion 
abnormalities. Wall thickness was moderately to markedly increased. There 
was severe concentric hypertrophy. Left atrium: The atrium was moderately dilated. Treatment:norvasc, lasix Thank you, 
       Yaneli Fernandez [address] [place] [address], [address] [facility]

## 2019-06-13 NOTE — PROGRESS NOTES
Spiritual Care Assessment/Progress Note  [address]      NAME: Federico Gifford      MRN: [id number]  AGE: 80 y.o.  SEX: male  Advent Affiliation: Zoroastrianism   Language: English     6/13/2019     Total Time (in minutes): 15     Spiritual Assessment begun in [address] [facility] [address] through conversation with:         [x]Patient        [] Family    [] Friend(s)        Reason for Consult: Advance medical directive consult     Spiritual beliefs: (Please include comment if needed)     [] Identifies with a doron tradition:         [] Supported by a doron community:            [] Claims no spiritual orientation:           [] Seeking spiritual identity:                [] Adheres to an individual form of spirituality:           [x] Not able to assess:                           Identified resources for coping:      [] Prayer                               [] Music                  [] Guided Imagery     [] Family/friends                 [] Pet visits     [] Devotional reading                         [] Unknown     [] Other:                                               Interventions offered during this visit: (See comments for more details)    Patient Interventions: Advance medical directive consult, Affirmation of emotions/emotional suffering, Affirmation of doron, Iconic (affirming the presence of God/Higher Power), Prayer (assurance of)           Plan of Care:     [x] Support spiritual and/or cultural needs    [] Support AMD and/or advance care planning process      [] Support grieving process   [] Coordinate Rites and/or Rituals    [] Coordination with community clergy   [] No spiritual needs identified at this time   [] Detailed Plan of Care below (See Comments)  [] Make referral to Music Therapy  [] Make referral to Pet Therapy     [] Make referral to Addiction services  [] Make referral to [facility]  [] Make referral to Spiritual Care Partner  [] No future visits requested        [x] Follow up visits as needed Comments:Request by family to assist with advance medical directive.  spoke with pt. Pt was unaware that an AMD had hortensia requested. Consulted with patients nurse. Nurse stated that family is normally visits in the mornings.  will follow up.     Shelby Aparicio,  Intern, MDiv  [phone])

## 2019-06-13 NOTE — PROGRESS NOTES
Reason for Admission:   Anemia                  RRAT Score:     19             Do you (patient/family) have any concerns for transition/discharge? None at this time - patient states he lives alone and his niece checks on him once every two weeks and his granddaughter checks on him 2 x a week- patient has never had any home health services or rehab stays in the past              Plan for utilizing home health:   TBD - will await plans for disposition plans per PT (if needed and MD)     Current Advanced Directive/Advance Care Plan:  None[facility] - patient has no eyesight in one eye but can read out of the other            Transition of Care Plan:     TBD - possible home vs home health with follow up with PCP     Care Management Interventions  PCP Verified by CM:  Yes  Transition of Care Consult (CM Consult): (TBD)  MyChart Signup: No  Discharge Durable Medical Equipment: No(uses a roalltor at home has no other DME)  Physical Therapy Consult: No  Occupational Therapy Consult: No  Speech Therapy Consult: No  Current Support Network: Lives Alone(Lives alone in an appartment)  Confirm Follow Up Transport: Friends(Foreest White)  Plan discussed with Pt/Family/Caregiver: Yes  Discharge Location  Discharge Placement: Home(TBD - home vs home health)

## 2019-06-13 NOTE — PROGRESS NOTES
Roverto Nataliamelita asked about pt's advance directives. He asked about it to pt but it seems like pt couldn't understand it and if any family member comes to see pt. Told Mr. Fracnk Farley that he might meet one of the family members if he would come in the morning. 1915 Pt's grand daughter Waldemar Diss [phone] said that she would come tomorrow 6/14 between 1130 to 1200 for pt;s advance directives.

## 2019-06-13 NOTE — PROGRESS NOTES
A Spiritual Care Partner Volunteer visited patient in Rm 437 on 6/13/2019.   Documented by:  Chaplain Armas MDiv, MS, [address] ([id number])

## 2019-06-14 ENCOUNTER — APPOINTMENT (OUTPATIENT)
Dept: NON INVASIVE DIAGNOSTICS | Age: 84
DRG: 812 | End: 2019-06-14
Attending: INTERNAL MEDICINE
Payer: MEDICARE

## 2019-06-14 LAB
ECHO AO ROOT DIAM: 2.47 CM
ECHO AV AREA PEAK VELOCITY: 0.8 CM2
ECHO AV AREA VTI: 0.5 CM2
ECHO AV MEAN GRADIENT: 34.9 MMHG
ECHO AV PEAK GRADIENT: 61.1 MMHG
ECHO AV PEAK VELOCITY: 390.79 CM/S
ECHO AV VTI: 96.84 CM
ECHO LA MAJOR AXIS: 3.85 CM
ECHO LA TO AORTIC ROOT RATIO: 1.56
ECHO LV E' LATERAL VELOCITY: 6.89 CM/S
ECHO LV E' SEPTAL VELOCITY: 4.58 CM/S
ECHO LV INTERNAL DIMENSION DIASTOLIC: 3.55 CM (ref 4.2–5.9)
ECHO LV INTERNAL DIMENSION SYSTOLIC: 2.4 CM
ECHO LV IVSD: 1.58 CM (ref 0.6–1)
ECHO LV MASS 2D: 257.6 G (ref 88–224)
ECHO LV MASS INDEX 2D: 139 G/M2 (ref 49–115)
ECHO LV POSTERIOR WALL DIASTOLIC: 1.6 CM (ref 0.6–1)
ECHO LVOT DIAM: 1.87 CM
ECHO LVOT PEAK GRADIENT: 4.8 MMHG
ECHO LVOT PEAK VELOCITY: 110 CM/S
ECHO LVOT SV: 47.9 ML
ECHO LVOT VTI: 17.44 CM
ECHO MV A VELOCITY: 84.89 CM/S
ECHO MV AREA PHT: 3.8 CM2
ECHO MV E DECELERATION TIME (DT): 197.7 MS
ECHO MV E VELOCITY: 87.1 CM/S
ECHO MV E/A RATIO: 1.03
ECHO MV E/E' LATERAL: 12.64
ECHO MV E/E' RATIO (AVERAGED): 15.83
ECHO MV E/E' SEPTAL: 19.02
ECHO MV PRESSURE HALF TIME (PHT): 57.3 MS
ECHO PV MAX VELOCITY: 68.74 CM/S
ECHO PV PEAK GRADIENT: 1.9 MMHG
ECHO RV TAPSE: 2.3 CM (ref 1.5–2)
ECHO TV REGURGITANT MAX VELOCITY: 279.96 CM/S
ECHO TV REGURGITANT PEAK GRADIENT: 31.4 MMHG
GLUCOSE BLD STRIP.AUTO-MCNC: 109 MG/DL (ref 65–100)
GLUCOSE BLD STRIP.AUTO-MCNC: 123 MG/DL (ref 65–100)
GLUCOSE BLD STRIP.AUTO-MCNC: 74 MG/DL (ref 65–100)
GLUCOSE BLD STRIP.AUTO-MCNC: 90 MG/DL (ref 65–100)
GLUCOSE BLD STRIP.AUTO-MCNC: 93 MG/DL (ref 65–100)
SERVICE CMNT-IMP: ABNORMAL
SERVICE CMNT-IMP: ABNORMAL
SERVICE CMNT-IMP: NORMAL

## 2019-06-14 PROCEDURE — 65390000012 HC CONDITION CODE 44 OBSERVATION

## 2019-06-14 PROCEDURE — 93306 TTE W/DOPPLER COMPLETE: CPT

## 2019-06-14 PROCEDURE — 74011250637 HC RX REV CODE- 250/637: Performed by: FAMILY MEDICINE

## 2019-06-14 PROCEDURE — 82784 ASSAY IGA/IGD/IGG/IGM EACH: CPT

## 2019-06-14 PROCEDURE — 82962 GLUCOSE BLOOD TEST: CPT

## 2019-06-14 PROCEDURE — 36415 COLL VENOUS BLD VENIPUNCTURE: CPT

## 2019-06-14 PROCEDURE — 74011000258 HC RX REV CODE- 258: Performed by: REGISTERED NURSE

## 2019-06-14 PROCEDURE — 94760 N-INVAS EAR/PLS OXIMETRY 1: CPT

## 2019-06-14 PROCEDURE — 74011250636 HC RX REV CODE- 250/636: Performed by: REGISTERED NURSE

## 2019-06-14 PROCEDURE — 65660000000 HC RM CCU STEPDOWN

## 2019-06-14 PROCEDURE — 96374 THER/PROPH/DIAG INJ IV PUSH: CPT

## 2019-06-14 RX ORDER — AMMONIUM LACTATE 12 G/100G
LOTION TOPICAL DAILY
Status: DISCONTINUED | OUTPATIENT
Start: 2019-06-15 | End: 2019-06-22 | Stop reason: HOSPADM

## 2019-06-14 RX ADMIN — Medication 10 ML: at 22:00

## 2019-06-14 RX ADMIN — POTASSIUM CHLORIDE 10 MEQ: 750 TABLET, FILM COATED, EXTENDED RELEASE ORAL at 08:42

## 2019-06-14 RX ADMIN — FUROSEMIDE 40 MG: 40 TABLET ORAL at 08:41

## 2019-06-14 RX ADMIN — SIMVASTATIN 20 MG: 20 TABLET, FILM COATED ORAL at 21:50

## 2019-06-14 RX ADMIN — AMLODIPINE BESYLATE 10 MG: 5 TABLET ORAL at 08:41

## 2019-06-14 RX ADMIN — TIMOLOL MALEATE 1 DROP: 5 SOLUTION OPHTHALMIC at 08:43

## 2019-06-14 RX ADMIN — TIMOLOL MALEATE 1 DROP: 5 SOLUTION OPHTHALMIC at 19:05

## 2019-06-14 RX ADMIN — Medication 10 ML: at 13:46

## 2019-06-14 RX ADMIN — Medication 10 ML: at 07:33

## 2019-06-14 RX ADMIN — IRON SUCROSE 300 MG: 20 INJECTION, SOLUTION INTRAVENOUS at 19:04

## 2019-06-14 RX ADMIN — THERA TABS 1 TABLET: TAB at 08:42

## 2019-06-14 NOTE — CONSULTS
[facility] [place] [address] [address], [address]  W: [phone]  F: [phone]    Reason for Consult:    Cain Burns is a 80 y.o. male who is seen in consultation at the request of Emely Osman NP for anemia     History of Present Illness:   Patient is a 80 y.o. male with multiple medical problems including coronary artery disease, aortic stenosis, hypertension, chronic kidney disease, and anemia. Previous workup consistent with severe iron deficiency, vitamin b12 deficiency, with possible component of CKD. He was given 1 doses of IV injectafer on on 6/5/19 as well as vitamin b12 shot. Second dose was due 6/12 however he missed appointment due to hospitalization. He was advised to go to ER by PCP for Hgb of 6.1g/dL. Upon arrival in ER, Hgb was 6.6g/dL. He was given 1 unit of prbc with response of Hgb to 8.2 g/dL. Occult stool drawn this admission was negative. Patient resting comfortably in bed. Has no complaints. Denies SOB, CP, cough, fevers/chills. He has seen no bleeding. Does not smoke or drink ETOH     Past Medical History:   Diagnosis Date    Cataracts, bilateral     Chronic pain     Diabetes ([address] 2/2/2011    Heart failure ([facility]     Hypertension       Past Surgical History:   Procedure Laterality Date    HX HERNIA REPAIR      HX ORTHOPAEDIC      bilat hip replacement    HX PROSTATECTOMY        Social History     Tobacco Use    Smoking status: Never Smoker    Smokeless tobacco: Never Used   Substance Use Topics    Alcohol use: No      History reviewed. No pertinent family history.   Current Facility-Administered Medications   Medication Dose Route Frequency    [START ON 6/15/2019] ammonium lactate (LAC-HYDRIN) 12 % lotion   Topical DAILY    glucose chewable tablet 16 g  4 Tab Oral PRN    dextrose (D50W) injection syrg 12.5-25 g  25-50 mL IntraVENous PRN    glucagon (GLUCAGEN) injection 1 mg  1 mg IntraMUSCular PRN    insulin lispro (HUMALOG) injection   SubCUTAneous AC&HS    timolol (TIMOPTIC) 0.5 % ophthalmic solution 1 Drop  1 Drop Left Eye BID    simvastatin (ZOCOR) tablet 20 mg  20 mg Oral QHS    potassium chloride SR (KLOR-CON 10) tablet 10 mEq  10 mEq Oral DAILY    furosemide (LASIX) tablet 40 mg  40 mg Oral DAILY    amLODIPine (NORVASC) tablet 10 mg  10 mg Oral DAILY    therapeutic multivitamin (THERAGRAN) tablet 1 Tab  1 Tab Oral DAILY    0.9% sodium chloride infusion 250 mL  250 mL IntraVENous PRN    0.9% sodium chloride infusion 250 mL  250 mL IntraVENous PRN    sodium chloride (NS) flush 5-40 mL  5-40 mL IntraVENous Q8H    sodium chloride (NS) flush 5-40 mL  5-40 mL IntraVENous PRN      No Known Allergies     Review of Systems: A complete review of systems was obtained, negative except as described above. Physical Exam:     Visit Vitals  /73 (BP 1 Location: Left arm, BP Patient Position: At rest)   Pulse 78   Temp 97.6 °F (36.4 °C)   Resp 16   Ht 5' 7\" (1.702 m)   Wt 163 lb (73.9 kg)   SpO2 100%   BMI 25.53 kg/m²     ECOG PS: 2  General: No distress  Eyes: PERRLA, anicteric sclerae  HENT: Atraumatic, OP clear  Neck: Supple  MS: he has bilateral ;LE edema with scaly skin that is broken down on the L ankle  Skin: No rashes, except on LE  Psych: Alert, oriented, appropriate affect, normal judgment/insight    Results:     Lab Results   Component Value Date/Time    WBC 9.6 06/13/2019 05:11 AM    HGB 8.2 (L) 06/13/2019 05:11 AM    HCT 27.3 (L) 06/13/2019 05:11 AM    PLATELET 862 02/02/0773 05:11 AM    MCV 93.2 06/13/2019 05:11 AM    ABS.  NEUTROPHILS 7.1 06/13/2019 05:11 AM     Lab Results   Component Value Date/Time    Sodium 147 (H) 06/13/2019 05:11 AM    Potassium 3.9 06/13/2019 05:11 AM    Chloride 116 (H) 06/13/2019 05:11 AM    CO2 25 06/13/2019 05:11 AM    Glucose 73 06/13/2019 05:11 AM    BUN 20 06/13/2019 05:11 AM    Creatinine 1.25 06/13/2019 05:11 AM    GFR est AA >60 06/13/2019 05:11 AM    GFR est non-AA 54 (L) 06/13/2019 05:11 AM    Calcium 8.4 (L) 06/13/2019 05:11 AM    Glucose (POC) 109 (H) 06/14/2019 04:49 PM     Lab Results   Component Value Date/Time    Bilirubin, total 0.6 06/13/2019 05:11 AM    ALT (SGPT) 10 (L) 06/13/2019 05:11 AM    AST (SGOT) 12 (L) 06/13/2019 05:11 AM    Alk. phosphatase 65 06/13/2019 05:11 AM    Protein, total 6.7 06/13/2019 05:11 AM    Albumin 2.4 (L) 06/13/2019 05:11 AM    Globulin 4.3 (H) 06/13/2019 05:11 AM     Lab Results   Component Value Date/Time    Iron % saturation 10 (L) 06/12/2019 03:55 AM    TIBC 283 06/12/2019 03:55 AM    Ferritin 46 06/05/2019 01:17 PM    Vitamin B12 1,460 (H) 06/11/2019 06:23 PM    Folate 19.1 06/11/2019 06:23 PM    Haptoglobin 218 (H) 02/07/2019 12:35 PM     07/20/2009 09:33 AM    M-Jb Not Observed 02/07/2019 12:35 PM    DAVID, Direct None Detected 07/20/2009 09:33 AM    Hep C Virus Ab 0.1 02/07/2019 12:35 PM    HIV SCREEN 4TH GENERATION WRFX Non Reactive 02/07/2019 12:35 PM     Lab Results   Component Value Date/Time    INR 1.2 (H) 04/22/2009 10:36 AM    aPTT 23.0 (L) 04/22/2009 10:36 AM     Component      Latest Ref Rng & Units 2/7/2019          12:35 PM   METHYLMALONIC ACID, SERUM      0 - 378 nmol/L 389 (H)     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 Outside records Records reviewed and summarized above. Pathology report(s) reviewed above. Radiology report(s) reviewed above. Assessment:   1) Normocytic anemia- progressive  Work-up is consistent with profound iron deficiency. He received 1 dose of IV injectafer on 6/5/19. Second dose missed due to hospitalization. Iron studies obtained this hospitalization still indicate TOM, however we will not see response of dose of injectafer for 6-8 weeks after infusion. He received 1 unit of prbc this admission which will provide him with 250mg of iron. Will give additional Venofer while inpatient. Concerned he has GI bleed.  Appreciate GI input. Plan for EGD/colonoscopy once cleared by cardiology. Monitor CBC daily and transfuse to maintain Hgb >7g/dL    2) Aortic stenosis  Noted on ECHO this admission  Cardiology following  Needs cardiac clearance prior to EGD/colonocsopy      3) CKD    4) psychosocial  He has limited social support      Plan:     · IV venofer 300mg x 2 doses ordered   · Cardiology clearance prior to EGD/colonoscopy   · Monitor CBC with diff daily and transfuse as indicated to maintain Hgb >7g/dL    Will continue to follow    I appreciate the opportunity to participate in Mr. Dagoberto Wallace's care.     Signed By: Brandyn Ramirez MD

## 2019-06-14 NOTE — PROGRESS NOTES
Hospitalist Progress Note    NAME: Gaston Barrera   :  1929   MRN:  [id number]       Assessment / Plan:    IMPRESSION AND PLAN:  1. Anemia - acute on chronic. Transfused 1 unit of packed red blood cells. Admit to telemetry. Stool occult blood test was performed, results are reportedly negative. Check iron profile, B12, folate levels. Hb stable 8.2, need cardiology clearance prior to Endoscopy. .2  Acute hypernatremia. Na 147 .  3. Dehydration. resolved. 4.  Generalized weakness and debility. Place on fall precautions. 5.  Type 2 diabetes mellitus. Place on Humalog insulin correction coverage schedule, Accu-Chek, and check hemoglobin A1c level. 6.  Hypertension. Resume back on home medications. 7.  Hyperlipidemia. Continue on simvastatin. 8.  Venous thromboembolism prophylaxis. Sequential compression devices to lower extremities.                Subjective:     Chief Complaint / Reason for Physician Visit  \"feeling ok, no chest pain, no abdominal pain \". Discussed with RN events overnight. Review of Systems:  Symptom Y/N Comments  Symptom Y/N Comments   Fever/Chills    Chest Pain     Poor Appetite    Edema     Cough    Abdominal Pain     Sputum    Joint Pain     SOB/JOSUE    Pruritis/Rash     Nausea/vomit    Tolerating PT/OT     Diarrhea    Tolerating Diet     Constipation    Other       Could NOT obtain due to:      Objective:     VITALS:   Last 24hrs VS reviewed since prior progress note.  Most recent are:  Patient Vitals for the past 24 hrs:   Temp Pulse Resp BP SpO2   19 1208 97.6 °F (36.4 °C) 78 16 165/73 100 %   19 1152    163/62    19 0711 97.5 °F (36.4 °C) 70 16 163/62 99 %   19 0533 97.7 °F (36.5 °C) 69  147/45 100 %   19 2331 97.5 °F (36.4 °C) 70 18 162/73 100 %   19 1945 97.7 °F (36.5 °C) 70 18 127/48 100 %   19 1602 97.8 °F (36.6 °C) 68 18 138/61 100 %       Intake/Output Summary (Last 24 hours) at 2019 1257  Last data filed at 6/14/2019 0711  Gross per 24 hour   Intake    Output 1600 ml   Net -1600 ml        PHYSICAL EXAM:  General: WD, WN. Alert, cooperative, no acute distress    EENT:  EOMI. Anicteric sclerae. MMM  Resp:  CTA bilaterally, no wheezing or rales. No accessory muscle use  CV:  Regular  rhythm,  No edema systolic murmur   GI:  Soft, Non distended, Non tender.  +Bowel sounds  Neurologic:  Alert and oriented X 3, normal speech,   Psych:   Good insight. Not anxious nor agitated  Skin:  No rashes. No jaundice    Reviewed most current lab test results and cultures  YES  Reviewed most current radiology test results   YES  Review and summation of old records today    NO  Reviewed patient's current orders and MAR    YES  PMH/SH reviewed - no change compared to H&P  ________________________________________________________________________  Care Plan discussed with:    Comments   Patient     Family      RN     Care Manager     Consultant                        Multidiciplinary team rounds were held today with , nursing, pharmacist and clinical coordinator. Patient's plan of care was discussed; medications were reviewed and discharge planning was addressed. ________________________________________________________________________  Total NON critical care TIME:  35 Minutes    Total CRITICAL CARE TIME Spent:   Minutes non procedure based      Comments   >50% of visit spent in counseling and coordination of care     ________________________________________________________________________  Deja Mishra MD     Procedures: see electronic medical records for all procedures/Xrays and details which were not copied into this note but were reviewed prior to creation of Plan. LABS:  I reviewed today's most current labs and imaging studies.   Pertinent labs include:  Recent Labs     06/13/19  0511 06/12/19  0355 06/11/19 2014   WBC 9.6 10.3 9.2   HGB 8.2* 8.2* 6.6*   HCT 27.3* 28.3* 23.3*    327 343 Recent Labs     06/13/19  0511 06/12/19  0355 06/11/19  1823   * 146* 146*   K 3.9 3.8 4.3   * 118* 116*   CO2 25 24 26   GLU 73 90 90   BUN 20 22* 26*   CREA 1.25 1.18 1.40*   CA 8.4* 8.2* 8.6   ALB 2.4*  --  3.0*   TBILI 0.6  --  0.3   SGOT 12*  --  14*   ALT 10*  --  12       Signed: Anne De Leon MD

## 2019-06-14 NOTE — PROGRESS NOTES
Cardiology Progress Note  6/14/2019    Admit Date: 6/11/2019  Admit Diagnosis: Anemia [D64.9]  Dehydration [E86.0]  Acute hypernatremia [E87.0]  Abnormal EKG [R94.31]  CC:                                                        Assessment:     Active Problems:    Anemia (2/7/2019)      Dehydration (6/11/2019)      Acute hypernatremia (6/11/2019)      Abnormal EKG (6/11/2019)        Plan: Aortic stenosis: Today's echo confirms that the patient has critical aortic stenosis: peak velocity from apex, 5 m/s; mean gradient 45 mmHg; SETH 0.4 cm2; EF is preserved    CAD: stable no angina    HTN: wellcontrolled    Rhythm: sinus rhythm    Volume status:euvolemic  Renal function: stable    High risk for cardiac complications from anesthesia and endoscopy procedures    For all other plans, see orders.    [x]     High complexity decision making was performed    Subjective:  Patient reports  []   nothing; unable to communicate    []    intubated   Chest Pain:  [x]   none,  consistent with []   non-cardiac  []   atypical  []   angina             [x]     none now      []     on-going  Dyspnea: [x]   none   []   at rest   []   with exertion   []   improved   []   unchanged   []   worsening  PND:        [x]     none   []     overnight    Orthopnea:   [x]     none     []     improved     []     unchanged     []     worsening  Presyncope: [x]     none     []     improved     []     unchanged     []     worsening  Ambulated in hallway without symptoms  []     Yes  Ambulated in room without symptoms  []     Yes  ROS(2+other systems)   Hematuria: []   Yes      [x]   No.        Dysuria: []   Yes      [x]   No                                           Cough:       []   Yes      [x]   No.        Sputum: []   Yes      [x]   No                                            Hematochezia: []   Yes    [x]   No     Melena: []   Yes       [x]   No                                            No change in family and social history from H&P/Consult note. Objective:    Physical Exam:  24 hr VS reviewed, overall VSSAF  Temp (24hrs), Av.6 °F (36.4 °C), Min:97.5 °F (36.4 °C), Max:97.8 °F (36.6 °C)    Patient Vitals for the past 8 hrs:   Pulse   19 1208 78    Patient Vitals for the past 8 hrs:   Resp   19 1208 16    Patient Vitals for the past 8 hrs:   BP   19 1208 165/73   19 1152 163/62          Intake/Output Summary (Last 24 hours) at 2019 1547  Last data filed at 2019 1347  Gross per 24 hour   Intake    Output 2000 ml   Net -2000 ml     General Appearance:   [x]     well developed, well nourished,      [x]     NAD. []     agitated      []     lethargic but arousable      []     obtunded   ENT, Palate:    [x]     WNL     []     dry palate/MM     [x]     anicteric     Respiratory:    [x] CTA bilateral   [] rales   [] rhonchi   [] normal resp effort      [] similar to yesterday    [] worse     [] improved   Cardiovascular:   [x]  RRR   []     Irregular rate and rhythm   [x]  Normal S1, S2   [x]     No gallop or rub. [] no murmur   [x]     no new murmur  []   murmur c/w:   [x] no edema;     RLE:[]     1+    []     2+    []     3+;                              LLE:[]     1+    []      2+    []      3+      []   edema similar to yesterday     []   worse      []   improved   [x]     normal JVP       []     Elevated JVP    [x]     JVP similar to yesterday     []   worse      []   improved   [x]     carotid upstroke unchanged   [x]     abd aorta not palpated   [x]     no stigmata of peripheral emboli   GI:    [x]     abd soft, non-distented,bowel sounds present, no                     organomegaly appreciated   Skin:  Neuro:    Cath Site:  [x]     warm and dry      []     cold extremities   [x]  A/O x 3, grossly non-focal     [] Obtunded    [] sedated     []   intact w/o hematoma or bruit; distal pulse unchanged. Data Review:  Lab results reviewed as noted below.                              Current medications reviewed as noted below. Telemetry independently reviewed : [x] sinus    [] chronic afib     [] par afib    [] NSVT    ECG independently reviewed: [x] NSR    [] no significant changes   [] no new ECG provided for review    No results for input(s): PH, PCO2, PO2 in the last 72 hours. Recent Labs     06/11/19 1823   TROIQ <0.05     Recent Labs     06/13/19  0511 06/12/19 0355 06/11/19 2014 06/11/19 1823   * 146*  --  146*   K 3.9 3.8  --  4.3   * 118*  --  116*   CO2 25 24  --  26   BUN 20 22*  --  26*   CREA 1.25 1.18  --  1.40*   GLU 73 90  --  90   CA 8.4* 8.2*  --  8.6   ALB 2.4*  --   --  3.0*   WBC 9.6 10.3 9.2  --    HGB 8.2* 8.2* 6.6*  --    HCT 27.3* 28.3* 23.3*  --     327 343  --      Recent Labs     06/13/19 0511 06/11/19 1823   SGOT 12* 14*   ALT 10* 12   AP 65 81   TBILI 0.6 0.3   TP 6.7 7.3   ALB 2.4* 3.0*   GLOB 4.3* 4.3*     No results for input(s): INR, PTP, APTT in the last 72 hours.     No lab exists for component: INREXT   Recent Labs     06/12/19 0355   TIBC 283   PSAT 10*      Lab Results   Component Value Date/Time    Glucose (POC) 93 06/14/2019 12:04 PM    Glucose (POC) 90 06/14/2019 06:20 AM    Glucose (POC) 74 06/14/2019 05:58 AM    Glucose (POC) 95 06/13/2019 09:23 PM    Glucose (POC) 91 06/13/2019 05:05 PM       Current Facility-Administered Medications   Medication Dose Route Frequency    [START ON 6/15/2019] ammonium lactate (LAC-HYDRIN) 12 % lotion   Topical DAILY    glucose chewable tablet 16 g  4 Tab Oral PRN    dextrose (D50W) injection syrg 12.5-25 g  25-50 mL IntraVENous PRN    glucagon (GLUCAGEN) injection 1 mg  1 mg IntraMUSCular PRN    insulin lispro (HUMALOG) injection   SubCUTAneous AC&HS    timolol (TIMOPTIC) 0.5 % ophthalmic solution 1 Drop  1 Drop Left Eye BID    simvastatin (ZOCOR) tablet 20 mg  20 mg Oral QHS    potassium chloride SR (KLOR-CON 10) tablet 10 mEq  10 mEq Oral DAILY    furosemide (LASIX) tablet 40 mg  40 mg Oral DAILY    amLODIPine (NORVASC) tablet 10 mg  10 mg Oral DAILY    therapeutic multivitamin (THERAGRAN) tablet 1 Tab  1 Tab Oral DAILY    0.9% sodium chloride infusion 250 mL  250 mL IntraVENous PRN    0.9% sodium chloride infusion 250 mL  250 mL IntraVENous PRN    sodium chloride (NS) flush 5-40 mL  5-40 mL IntraVENous Q8H    sodium chloride (NS) flush 5-40 mL  5-40 mL IntraVENous PRN         Pauline Baez MD

## 2019-06-14 NOTE — ROUTINE PROCESS
Bedside and Verbal shift change report given to Edmon Olszewski (oncoming nurse) by Ritika Butler (offgoing nurse).  Report included the following information SBAR, Intake/Output, MAR and Cardiac Rhythm SR.

## 2019-06-14 NOTE — PROGRESS NOTES
CM discussed patient with Dr. Leanne Morales, PCP. He was concerned about patient living alone and thought  he might benefit from rehab at a SNF in order to further recuperate prior to being discharged home. CM noted PT/OT consults. PCP also said he had a discussion with patient regarding having an AMD done during this hospitalization and patient was agreeable. CM notified the brandon. He also said he encouraged patient to begin looking into an JEWEL. CM gave patient a list of local ALFs . CM also notified Dr. Georgette Nayak of the above.   Shelton Salas MSA, RN, CRM

## 2019-06-14 NOTE — WOUND CARE
Wound Consult:  New Patient Visit. Chart reviewed. Consulted for lower leg wound left posterior leg. Spoke with patients nurse,  Kanu Giles who consulted to hand off. Patient is resting on a total care bed with foam mattress. Patient tells me he lives alone with some help coming in; uses rollator at home; legs have become more swollen as of late. Assessment: 
Left posterior lower leg - 8 x 3 x 0.1 cm area of injury; partially resurfaced, pink and dry and partially remains open, moist bright pink with no odor or purulence, serous drainage noted. Surrounding skin on entire lower leg from below knee to above ankle area is bark like, dry and dark. Right lower leg - entire lower leg from below knee to above ankle area is bark like, dry and dark. No open wounds noted on this leg; edema in this leg less than left which patient reports is his baseline. Gluteal cleft - has intact, hypopigmented skin distally, otherwise no discolorations / redness through sacrum or buttocks. No discoloration to heels bilaterally. Treatment: 
Moisturized lower legs/feet and applied barrier ointment to buttocks. Xeroform gauze to left posterior leg ulcer, covered with 4x4s, ABD and kerlex with light ace wrap to secure. Heels floated. Wound Recommendations: 
Moisturize lower legs daily with Lac Hydrin ointment. Xeroform gauze to left posterior leg ulcer, cover with 4x4s, ABD and kerlex with light ace wrap to secure. Cleanse with wound cleanser. every other day. Skin Care Recommendations: 1. Minimize friction/shear: minimize layers of linen/pads under patient. 2. Off load pressure/reposition: continue to turn and reposition approximately every 2 hours; float heels or use Prevalon boots; waffle cushion for sitting. 3. Manage Moisture - keep skin folds dry; promote continence; using urinal. 
4. Continue to monitor nutrition, pain, and skin risk scale, and skin assessment. Plan:  
Will discuss with MD. 
 We will continue to reassess routinely and as needed. Peterson Gonzalez RN,[facility] Office [phone] Pager [phone]

## 2019-06-15 LAB
ALBUMIN SERPL-MCNC: 2.3 G/DL (ref 3.5–5)
ALBUMIN/GLOB SERPL: 0.5 {RATIO} (ref 1.1–2.2)
ALP SERPL-CCNC: 68 U/L (ref 45–117)
ALT SERPL-CCNC: 11 U/L (ref 12–78)
ANION GAP SERPL CALC-SCNC: 4 MMOL/L (ref 5–15)
AST SERPL-CCNC: 12 U/L (ref 15–37)
BASOPHILS # BLD: 0 K/UL (ref 0–0.1)
BASOPHILS NFR BLD: 0 % (ref 0–1)
BILIRUB SERPL-MCNC: 0.3 MG/DL (ref 0.2–1)
BUN SERPL-MCNC: 15 MG/DL (ref 6–20)
BUN/CREAT SERPL: 14 (ref 12–20)
CALCIUM SERPL-MCNC: 8 MG/DL (ref 8.5–10.1)
CHLORIDE SERPL-SCNC: 106 MMOL/L (ref 97–108)
CO2 SERPL-SCNC: 29 MMOL/L (ref 21–32)
CREAT SERPL-MCNC: 1.04 MG/DL (ref 0.7–1.3)
DIFFERENTIAL METHOD BLD: ABNORMAL
EOSINOPHIL # BLD: 0.3 K/UL (ref 0–0.4)
EOSINOPHIL NFR BLD: 3 % (ref 0–7)
ERYTHROCYTE [DISTWIDTH] IN BLOOD BY AUTOMATED COUNT: 18.5 % (ref 11.5–14.5)
GLOBULIN SER CALC-MCNC: 4.3 G/DL (ref 2–4)
GLUCOSE BLD STRIP.AUTO-MCNC: 114 MG/DL (ref 65–100)
GLUCOSE BLD STRIP.AUTO-MCNC: 128 MG/DL (ref 65–100)
GLUCOSE BLD STRIP.AUTO-MCNC: 133 MG/DL (ref 65–100)
GLUCOSE BLD STRIP.AUTO-MCNC: 75 MG/DL (ref 65–100)
GLUCOSE BLD STRIP.AUTO-MCNC: 83 MG/DL (ref 65–100)
GLUCOSE SERPL-MCNC: 93 MG/DL (ref 65–100)
HCT VFR BLD AUTO: 30.6 % (ref 36.6–50.3)
HGB BLD-MCNC: 9.2 G/DL (ref 12.1–17)
IMM GRANULOCYTES # BLD AUTO: 0.1 K/UL (ref 0–0.04)
IMM GRANULOCYTES NFR BLD AUTO: 1 % (ref 0–0.5)
LYMPHOCYTES # BLD: 0.9 K/UL (ref 0.8–3.5)
LYMPHOCYTES NFR BLD: 12 % (ref 12–49)
MCH RBC QN AUTO: 27.5 PG (ref 26–34)
MCHC RBC AUTO-ENTMCNC: 30.1 G/DL (ref 30–36.5)
MCV RBC AUTO: 91.3 FL (ref 80–99)
MONOCYTES # BLD: 0.8 K/UL (ref 0–1)
MONOCYTES NFR BLD: 10 % (ref 5–13)
NEUTS SEG # BLD: 5.9 K/UL (ref 1.8–8)
NEUTS SEG NFR BLD: 74 % (ref 32–75)
NRBC # BLD: 0 K/UL (ref 0–0.01)
NRBC BLD-RTO: 0 PER 100 WBC
PLATELET # BLD AUTO: 299 K/UL (ref 150–400)
PMV BLD AUTO: 9.4 FL (ref 8.9–12.9)
POTASSIUM SERPL-SCNC: 3.9 MMOL/L (ref 3.5–5.1)
PROT SERPL-MCNC: 6.6 G/DL (ref 6.4–8.2)
RBC # BLD AUTO: 3.35 M/UL (ref 4.1–5.7)
SERVICE CMNT-IMP: ABNORMAL
SERVICE CMNT-IMP: NORMAL
SERVICE CMNT-IMP: NORMAL
SODIUM SERPL-SCNC: 139 MMOL/L (ref 136–145)
WBC # BLD AUTO: 8 K/UL (ref 4.1–11.1)

## 2019-06-15 PROCEDURE — 97165 OT EVAL LOW COMPLEX 30 MIN: CPT

## 2019-06-15 PROCEDURE — 97530 THERAPEUTIC ACTIVITIES: CPT

## 2019-06-15 PROCEDURE — 65660000000 HC RM CCU STEPDOWN

## 2019-06-15 PROCEDURE — 74011250637 HC RX REV CODE- 250/637: Performed by: FAMILY MEDICINE

## 2019-06-15 PROCEDURE — 82962 GLUCOSE BLOOD TEST: CPT

## 2019-06-15 PROCEDURE — 80053 COMPREHEN METABOLIC PANEL: CPT

## 2019-06-15 PROCEDURE — 65390000012 HC CONDITION CODE 44 OBSERVATION

## 2019-06-15 PROCEDURE — 36415 COLL VENOUS BLD VENIPUNCTURE: CPT

## 2019-06-15 PROCEDURE — 97161 PT EVAL LOW COMPLEX 20 MIN: CPT

## 2019-06-15 PROCEDURE — 85025 COMPLETE CBC W/AUTO DIFF WBC: CPT

## 2019-06-15 PROCEDURE — 74011250636 HC RX REV CODE- 250/636: Performed by: REGISTERED NURSE

## 2019-06-15 PROCEDURE — 74011000258 HC RX REV CODE- 258: Performed by: REGISTERED NURSE

## 2019-06-15 PROCEDURE — 97116 GAIT TRAINING THERAPY: CPT

## 2019-06-15 PROCEDURE — 97535 SELF CARE MNGMENT TRAINING: CPT

## 2019-06-15 PROCEDURE — 96376 TX/PRO/DX INJ SAME DRUG ADON: CPT

## 2019-06-15 RX ADMIN — IRON SUCROSE 300 MG: 20 INJECTION, SOLUTION INTRAVENOUS at 18:52

## 2019-06-15 RX ADMIN — FUROSEMIDE 40 MG: 40 TABLET ORAL at 08:42

## 2019-06-15 RX ADMIN — TIMOLOL MALEATE 1 DROP: 5 SOLUTION OPHTHALMIC at 18:52

## 2019-06-15 RX ADMIN — Medication 10 ML: at 21:45

## 2019-06-15 RX ADMIN — Medication: at 08:50

## 2019-06-15 RX ADMIN — THERA TABS 1 TABLET: TAB at 08:42

## 2019-06-15 RX ADMIN — SIMVASTATIN 20 MG: 20 TABLET, FILM COATED ORAL at 21:44

## 2019-06-15 RX ADMIN — AMLODIPINE BESYLATE 10 MG: 5 TABLET ORAL at 08:42

## 2019-06-15 RX ADMIN — POTASSIUM CHLORIDE 10 MEQ: 750 TABLET, FILM COATED, EXTENDED RELEASE ORAL at 08:42

## 2019-06-15 RX ADMIN — TIMOLOL MALEATE 1 DROP: 5 SOLUTION OPHTHALMIC at 08:44

## 2019-06-15 NOTE — PROGRESS NOTES
Hospitalist Progress Note    NAME: Cain Burns   :  1929   MRN:  [id number]       Assessment / Plan:    IMPRESSION AND PLAN:  1. Anemia - acute on chronic. Transfused 1 unit of packed red blood cells. Admit to telemetry. Stool occult blood test was performed, results are reportedly negative. Check iron profile, B12, folate levels. Hb stable 8.2, need cardiology clearance prior to Endoscopy. Cardiology advised . Balloon aortic valvuloplasty  . 2  Acute hypernatremia. Na 147 .  3. Dehydration. resolved. 4.  Generalized weakness and debility. Place on fall precautions. 5.  Type 2 diabetes mellitus. Place on Humalog insulin correction coverage schedule, Accu-Chek, and check hemoglobin A1c level. 6.  Hypertension. Resume back on home medications. 7.  Hyperlipidemia. Continue on simvastatin. 8.  Venous thromboembolism prophylaxis. Sequential compression devices to lower extremities.                Subjective:     Chief Complaint / Reason for Physician Visit  \"no chest pain, shortness of breath  \". Discussed with RN events overnight. Review of Systems:  Symptom Y/N Comments  Symptom Y/N Comments   Fever/Chills    Chest Pain     Poor Appetite    Edema     Cough    Abdominal Pain     Sputum    Joint Pain     SOB/JOSUE    Pruritis/Rash     Nausea/vomit    Tolerating PT/OT     Diarrhea    Tolerating Diet     Constipation    Other       Could NOT obtain due to:      Objective:     VITALS:   Last 24hrs VS reviewed since prior progress note.  Most recent are:  Patient Vitals for the past 24 hrs:   Temp Pulse Resp BP SpO2   06/15/19 1337 97.5 °F (36.4 °C) 73 16 136/57 100 %   06/15/19 0745 97.5 °F (36.4 °C) 61 17 139/58 98 %   06/15/19 0318 97.8 °F (36.6 °C) 63 16 163/68 97 %   19 2315 97.6 °F (36.4 °C) 66 12 151/59 98 %   19 1942     93 %   19 1920 97.4 °F (36.3 °C) 77 16 137/66 100 %   19 1645 97.6 °F (36.4 °C) 82 16 150/57 100 %       Intake/Output Summary (Last 24 hours) at 6/15/2019 1452  Last data filed at 6/15/2019 0745  Gross per 24 hour   Intake 340 ml   Output 950 ml   Net -610 ml        PHYSICAL EXAM:  General: WD, WN. Alert, cooperative, no acute distress    EENT:  EOMI. Anicteric sclerae. MMM  Resp:  CTA bilaterally, no wheezing or rales. No accessory muscle use  CV:  Regular  rhythm,  No edema ,systolic murmur   GI:  Soft, Non distended, Non tender.  +Bowel sounds  Neurologic:  Alert and oriented X 3, normal speech,   Psych:   Good insight. Not anxious nor agitated  Skin:  No rashes. No jaundice    Reviewed most current lab test results and cultures  YES  Reviewed most current radiology test results   YES  Review and summation of old records today    NO  Reviewed patient's current orders and MAR    YES  PMH/SH reviewed - no change compared to H&P  ________________________________________________________________________  Care Plan discussed with:    Comments   Patient     Family      RN     Care Manager     Consultant                        Multidiciplinary team rounds were held today with , nursing, pharmacist and clinical coordinator. Patient's plan of care was discussed; medications were reviewed and discharge planning was addressed. ________________________________________________________________________  Total NON critical care TIME:  35 Minutes    Total CRITICAL CARE TIME Spent:   Minutes non procedure based      Comments   >50% of visit spent in counseling and coordination of care     ________________________________________________________________________  Landen Munoz MD     Procedures: see electronic medical records for all procedures/Xrays and details which were not copied into this note but were reviewed prior to creation of Plan. LABS:  I reviewed today's most current labs and imaging studies.   Pertinent labs include:  Recent Labs     06/15/19  0240 06/13/19  0511   WBC 8.0 9.6   HGB 9.2* 8.2*   HCT 30.6* 27.3*    329 Recent Labs     06/15/19  0240 06/13/19  0511    147*   K 3.9 3.9    116*   CO2 29 25   GLU 93 73   BUN 15 20   CREA 1.04 1.25   CA 8.0* 8.4*   ALB 2.3* 2.4*   TBILI 0.3 0.6   SGOT 12* 12*   ALT 11* 10*       Signed: Rehan Hagen MD

## 2019-06-15 NOTE — PROGRESS NOTES
Bedside shift change report given to Shi RN (oncoming nurse) by King Ashley RN (offgoing nurse). Report included the following information SBAR.

## 2019-06-15 NOTE — PROGRESS NOTES
Problem: Self Care Deficits Care Plan (Adult)  Goal: *Acute Goals and Plan of Care (Insert Text)  Description  Occupational Therapy Goals  Initiated 6/15/2019  1. Patient will perform upper body dressing with supervision/set-up within 7 day(s). 2.  Patient will perform lower body dressing with minimal assistance/contact guard assist within 7 day(s). 3.  Patient will perform seated bathing with minimal assistance/contact guard assist within 7 day(s). 4.  Patient will perform toilet transfers with supervision/set-up within 7 day(s). 5.  Patient will perform all aspects of toileting with minimal assistance/contact guard assist within 7 day(s). Outcome: Progressing Towards Goal     OCCUPATIONAL THERAPY EVALUATION  Patient: Nasrin Farley (47 y.o. male)  Date: 6/15/2019  Primary Diagnosis: Anemia [D64.9]  Dehydration [E86.0]  Acute hypernatremia [E87.0]  Abnormal EKG [R94.31]  Procedure(s) (LRB):  ESOPHAGOGASTRODUODENOSCOPY (EGD) (N/A)  COLONOSCOPY (N/A) 2 Days Post-Op   Precautions:   Fall    ASSESSMENT :  Based on the objective data described below, patient presents with Moderate Assistance upper body ADLs, Maximum assistance lower body ADLs, and need for A with sit<> stands, standing balance and functional mobility with personal rollator. PTA, pt reported he lived alone with family checking in periodically, mod I for ADLs and performing IADLs. This date, pt received after sitting in the chair for 4+ hours and he needed up max A x 2 to stand due to poor hip flexion, poor anterior weight shifting and narrow base of support. He has grossly decreased B UE ROM, limiting his UB ADLs and poor static sitting balance without UE support. At this time, pt is not safe to return home and would benefit from IPR stay.      The following are barriers to ADL independence while in acute care:   - Cognitive and/or behavioral: sequencing  - Medical condition: ROM, strength, standing balance and vision    - Other:       Patient will benefit from skilled acute intervention to address the above impairments. Patient?s rehabilitation potential is considered to be Good    Discharge recommendations: IPR  If above is not an option then recommend: Rehab at skilled nursing facility (SNF) (to regain functional baseline patient requires rehab)    Barriers to discharging home, in addition to above listed impairments: lives alone  family availability to assist  level of physical assist required to maintain patient safety. Equipment recommendations for successful discharge (if) home: TBD     PLAN :  Recommendations and Planned Interventions: self care training, balance training, endurance activities, patient education and home safety training    Frequency/Duration: Patient will be followed by occupational therapy 4 times a week to address goals. SUBJECTIVE:   Patient stated ? oh I do the cooking. I stand to do it. ?    OBJECTIVE DATA SUMMARY:   HISTORY:   Past Medical History:   Diagnosis Date    Cataracts, bilateral     Chronic pain     Diabetes ([facility] [address] 2/2/2011    Heart failure ([facility] [address]     Hypertension      Past Surgical History:   Procedure Laterality Date    HX HERNIA REPAIR      HX ORTHOPAEDIC      bilat hip replacement    HX PROSTATECTOMY         Prior Level of Function/Environment/Context: lives home alone, uses his rollator. Family checks in periodically.  Pt reports being able to perform his own ADLs and IADLs to include standing in the kitchen to cook  Occupations in which the patient is/was successful, what are the barriers preventing that success:   Performance Patterns (routines, roles, habits, and rituals):   Personal Interests and/or values:   Expanded or extensive additional review of patient history:     Home Situation  Home Environment: Private residence  One/Two Story Residence: One story  Living Alone: Yes  Support Systems: Family member(s)  Patient Expects to be Discharged to[de-identified] Rehabilitation facility  Current DME Used/Available at Home: [employer], rollator    Hand dominance: Right    EXAMINATION OF PERFORMANCE DEFICITS:  Cognitive/Behavioral Status:  Neurologic State: Alert     Cognition: Follows commands  Perception: Appears intact  Perseveration: No perseveration noted  Safety/Judgement: Awareness of environment    Skin: B LE venous stasis    Edema: B LE    Hearing: Auditory  Auditory Impairment: None    Vision/Perceptual:    Tracking: Able to track stimulus in all quadrants w/o difficulty                                Range of Motion:    AROM: Generally decreased, functional(decreased B shoulder flexion R > L)  PROM: Generally decreased, functional                      Strength:    Strength: Generally decreased, functional                Coordination:  Coordination: Generally decreased, functional  Fine Motor Skills-Upper: Left Intact; Right Intact    Gross Motor Skills-Upper: Left Intact; Right Intact    Tone & Sensation:    Tone: Normal  Sensation: Intact                      Balance:  Sitting: Impaired  Sitting - Static: Poor (constant support)  Sitting - Dynamic: Poor (constant support)  Standing: Impaired; With support  Standing - Static: Fair;Constant support  Standing - Dynamic : Poor;Constant support    Functional Mobility and Transfers for ADLs:  Bed Mobility:  Supine to Sit: (received in chair)  Sit to Supine: Maximum assistance;Assist x2  Scooting: Maximum assistance;Assist x2    Transfers:  Sit to Stand: Maximum assistance;Assist x2  Stand to Sit: Maximum assistance;Assist x2  Bed to Chair: Maximum assistance;Assist x2    ADL Assessment:  Feeding: Setup    Oral Facial Hygiene/Grooming: Contact guard assistance(standing at sink to brush teeth)    Bathing: Moderate assistance    Upper Body Dressing: Moderate assistance    Lower Body Dressing: Maximum assistance(decreased standing balance, poor hip flexion)    Toileting:  Moderate assistance                ADL Intervention and task modifications:        Pt required verbal cues, step by step for sequencing for hand placement, proper leg placement to assist with sit to stand. During attempt to stand, pt with posterior lean and poor anterior weight shifting requiring max A from therapist to achieve midline. Min A needed for rollator management as pt is blind R eye. In sitting EOB, pt with strong UE support, poor B hip flexion with pt using abdominal muscles to maintain. Unable to forward flexion for simulated reaching/LB ADLs and may need AE. Cognitive Retraining  Safety/Judgement: Awareness of environment      Functional Measure:  Barthel Index:    Bathin  Bladder: 10  Bowels: 10  Groomin  Dressin  Feeding: 10  Mobility: 0  Stairs: 0  Toilet Use: 5  Transfer (Bed to Chair and Back): 5  Total: 45/100        Percentage of impairment   0%   1-19%   20-39%   40-59%   60-79%   80-99%   100%   Barthel Score 0-100 100 99-80 79-60 59-40 20-39 1-19   0     The Barthel ADL Index: Guidelines  1. The index should be used as a record of what a patient does, not as a record of what a patient could do. 2. The main aim is to establish degree of independence from any help, physical or verbal, however minor and for whatever reason. 3. The need for supervision renders the patient not independent. 4. A patient's performance should be established using the best available evidence. Asking the patient, friends/relatives and nurses are the usual sources, but direct observation and common sense are also important. However direct testing is not needed. 5. Usually the patient's performance over the preceding 24-48 hours is important, but occasionally longer periods will be relevant. 6. Middle categories imply that the patient supplies over 50 per cent of the effort. 7. Use of aids to be independent is allowed. David Colmenares., Barthel, D.W. ([id number]). Functional evaluation: the Barthel Index. [facility] (14)2.   KAVITHA Wheeler, Brandon Franco., Saúl De Los Santos., Jitendra GISEL. (1999). Measuring the change indisability after inpatient rehabilitation; comparison of the responsiveness of the Barthel Index and Functional [place] Measure. Journal of Neurology, Neurosurgery, and Psychiatry, 66(4), 929-214. GLENYS Salvador, STEVE Mckenzie, & Verna Owen M.A. (2004.) Assessment of post-stroke quality of life in cost-effectiveness studies: The usefulness of the Barthel Index and the EuroQoL-5D. Quality of Life Research, 15, 307-70         Occupational Therapy Evaluation Charge Determination   History Examination Decision-Making   LOW Complexity : Brief history review  LOW Complexity : 1-3 performance deficits relating to physical, cognitive , or psychosocial skils that result in activity limitations and / or participation restrictions  MEDIUM Complexity : Patient may present with comorbidities that affect occupational performnce. Miniml to moderate modification of tasks or assistance (eg, physical or verbal ) with assesment(s) is necessary to enable patient to complete evaluation       Based on the above components, the patient evaluation is determined to be of the following complexity level: LOW       Activity Tolerance:   Good  Please refer to the flowsheet for vital signs taken during this treatment. After treatment patient left:   Supine in bed  Call light within reach  RN notified   COMMUNICATION/EDUCATION:   The patient?s plan of care was discussed with: Physical Therapist and Registered Nurse. Patient/family have participated as able in goal setting and plan of care. This patient?s plan of care is appropriate for delegation to [facility].     Thank you for this referral.  Anika Frazier OT  Time Calculation: 26 mins

## 2019-06-15 NOTE — PROGRESS NOTES
[facility] Cardiology Progress Note    Date of service: 6/15/2019    Subjective:  Mr Shea Tejeda has no new complaints at the moment    Objective:    Visit Vitals  /58 (BP 1 Location: Left arm, BP Patient Position: At rest)   Pulse 61   Temp 97.5 °F (36.4 °C)   Resp 17   Ht 5' 7\" (1.702 m)   Wt 78 kg (171 lb 15.3 oz)   SpO2 98%   BMI 26.93 kg/m²       Physical Exam   Constitutional: He is oriented to person, place, and time. He appears well-developed and well-nourished. HENT:   Head: Normocephalic and atraumatic. Eyes: Conjunctivae are normal. No scleral icterus. Neck: No JVD present. Cardiovascular: Normal rate and regular rhythm. Exam reveals no gallop. Murmur heard. Harsh midsystolic murmur is present with a grade of 3/6 at the upper right sternal border radiating to the neck. Pulmonary/Chest: Effort normal and breath sounds normal. No stridor. No respiratory distress. He has no wheezes. He has no rales. Abdominal: Soft. He exhibits no distension. Musculoskeletal: He exhibits no edema or deformity. Neurological: He is alert and oriented to person, place, and time. Skin: Skin is warm and dry. Psychiatric: He has a normal mood and affect. His behavior is normal.       Data reviewed:  Recent Results (from the past 12 hour(s))   METABOLIC PANEL, COMPREHENSIVE    Collection Time: 06/15/19  2:40 AM   Result Value Ref Range    Sodium 139 136 - 145 mmol/L    Potassium 3.9 3.5 - 5.1 mmol/L    Chloride 106 97 - 108 mmol/L    CO2 29 21 - 32 mmol/L    Anion gap 4 (L) 5 - 15 mmol/L    Glucose 93 65 - 100 mg/dL    BUN 15 6 - 20 MG/DL    Creatinine 1.04 0.70 - 1.30 MG/DL    BUN/Creatinine ratio 14 12 - 20      GFR est AA >60 >60 ml/min/1.73m2    GFR est non-AA >60 >60 ml/min/1.73m2    Calcium 8.0 (L) 8.5 - 10.1 MG/DL    Bilirubin, total 0.3 0.2 - 1.0 MG/DL    ALT (SGPT) 11 (L) 12 - 78 U/L    AST (SGOT) 12 (L) 15 - 37 U/L    Alk.  phosphatase 68 45 - 117 U/L    Protein, total 6.6 6.4 - 8.2 g/dL    Albumin 2.3 (L) 3.5 - 5.0 g/dL    Globulin 4.3 (H) 2.0 - 4.0 g/dL    A-G Ratio 0.5 (L) 1.1 - 2.2     CBC WITH AUTOMATED DIFF    Collection Time: 06/15/19  2:40 AM   Result Value Ref Range    WBC 8.0 4.1 - 11.1 K/uL    RBC 3.35 (L) 4.10 - 5.70 M/uL    HGB 9.2 (L) 12.1 - 17.0 g/dL    HCT 30.6 (L) 36.6 - 50.3 %    MCV 91.3 80.0 - 99.0 FL    MCH 27.5 26.0 - 34.0 PG    MCHC 30.1 30.0 - 36.5 g/dL    RDW 18.5 (H) 11.5 - 14.5 %    PLATELET 368 938 - 972 K/uL    MPV 9.4 8.9 - 12.9 FL    NRBC 0.0 0  WBC    ABSOLUTE NRBC 0.00 0.00 - 0.01 K/uL    NEUTROPHILS 74 32 - 75 %    LYMPHOCYTES 12 12 - 49 %    MONOCYTES 10 5 - 13 %    EOSINOPHILS 3 0 - 7 %    BASOPHILS 0 0 - 1 %    IMMATURE GRANULOCYTES 1 (H) 0.0 - 0.5 %    ABS. NEUTROPHILS 5.9 1.8 - 8.0 K/UL    ABS. LYMPHOCYTES 0.9 0.8 - 3.5 K/UL    ABS. MONOCYTES 0.8 0.0 - 1.0 K/UL    ABS. EOSINOPHILS 0.3 0.0 - 0.4 K/UL    ABS. BASOPHILS 0.0 0.0 - 0.1 K/UL    ABS. IMM. GRANS. 0.1 (H) 0.00 - 0.04 K/UL    DF AUTOMATED     GLUCOSE, POC    Collection Time: 06/15/19  6:51 AM   Result Value Ref Range    Glucose (POC) 75 65 - 100 mg/dL    Performed by Wilburn Cowden E (CON)    GLUCOSE, POC    Collection Time: 06/15/19  7:20 AM   Result Value Ref Range    Glucose (POC) 83 65 - 100 mg/dL    Performed by Annie Black      06/11/19   ECHO ADULT COMPLETE 06/14/2019 6/14/2019    Narrative · Mitral Valve: Mitral valve thickening. Trace mitral valve regurgitation. · Tricuspid Valve: Mild tricuspid valve regurgitation is present. · Pulmonary Artery: There is no evidence of pulmonary hypertension. · Left Ventricle: Severe concentric hypertrophy. Estimated left   ventricular ejection fraction is 66 - 70%. Visually measured ejection   fraction. No regional wall motion abnormality noted. · Pulmonic Valve: Mild pulmonic valve regurgitation is present. · Aortic Valve: Severe aortic valve sclerosis with reduced excursion. Aortic valve leaflet calcification present.  Critical aortic valve stenosis   is present. Signed by: Katherin Khan MD       Assessment:  1. Severe iron deficiency anemia  Unclear source but GI blood loss is suspect  GI following and hopes to evaluate with endoscopy  2. Severe (critical) aortic stenosis  Would make sedation for endoscopic procedures hazardous  3. Unintended weight loss  Concerning for possible underlying malignancy  4. CAD s/p PCI in 2014    Plan:  Continue current therapies for now  With underdetermined source of anemia and weight loss, he would currently be a cohort C patient in terms of thinking TAVR  It may be a reasonable option to consider BAV though as a way of decreasing severity of AS and making it safer to undergo full GI evaluation. Will continue to follow along and discuss with Dr Greg Alvarado Monday    Signed:  Olga Perez.  Marie Gutierrez MD  Interventional Cardiology  6/15/2019

## 2019-06-15 NOTE — PROGRESS NOTES
Problem: Mobility Impaired (Adult and Pediatric)  Goal: *Acute Goals and Plan of Care (Insert Text)  Description  Physical Therapy Goals  Initiated 6/15/2019  1. Patient will move from supine to sit and sit to supine , scoot up and down and roll side to side in bed with moderate assistance  within 7 day(s). 2.  Patient will transfer from bed to chair and chair to bed with moderate assistance  using the least restrictive device within 7 day(s). 3.  Patient will perform sit to stand with moderate assistance  within 7 day(s). 4.  Patient will ambulate with minimal assistance/contact guard assist for 150 feet with the least restrictive device within 7 day(s). Outcome: Progressing Towards Goal  PHYSICAL THERAPY EVALUATION  Patient: Nasrin Farley (61 y.o. male)  Date: 6/15/2019  Primary Diagnosis: Anemia [D64.9]  Dehydration [E86.0]  Acute hypernatremia [E87.0]  Abnormal EKG [R94.31]  Procedure(s) (LRB):  ESOPHAGOGASTRODUODENOSCOPY (EGD) (N/A)  COLONOSCOPY (N/A) 2 Days Post-Op   Precautions: Fall       ASSESSMENT :  Based on the objective data described below, the patient presents with decreased ROM, decreased strength, poor balance, unsteady gait, poor safety awareness, and overall decrease in functional mobility s/p admission. Patient questionable historian, but reports he lives alone, family checks on him every few days, independence with ADLs, and ambulating Nati using rollator. This date, patient received sitting up in chair and reports he has been sitting up for 5 hours. He required maxA x2 for sit<>stand transfer. Ambulated 30 ft with [place]une[place] using rollator with cuing assist for walker management as patient was walking into obstacles. Patient able to stand at sink performing ADL tasks for ~5 minutes. Recommending IP rehab upon discharge to maximize safety and independence with functional mobility. If IP rehab is not an option, patient will require SNF placement.       Patient will benefit from skilled intervention to address the above impairments. Patient?s rehabilitation potential is considered to be Fair  Factors which may influence rehabilitation potential include:   ? None noted  ? Mental ability/status  ? Medical condition  ? Home/family situation and support systems  ? Safety awareness  ? Pain tolerance/management  ? Other:      PLAN :  Recommendations and Planned Interventions:  ?           Bed Mobility Training             ? Neuromuscular Re-Education  ? Transfer Training                   ? Orthotic/Prosthetic Training  ? Gait Training                         ? Modalities  ? Therapeutic Exercises           ? Edema Management/Control  ? Therapeutic Activities            ? Patient and Family Training/Education  ? Other (comment):    Frequency/Duration: Patient will be followed by physical therapy  4 times a week to address goals. Discharge Recommendations: IP rehab. If that is not an option, then patient will require SNF placement. Further Equipment Recommendations for Discharge: None      SUBJECTIVE:   Patient stated ? I was doing good up until a week ago. ?    OBJECTIVE DATA SUMMARY:   HISTORY:    Past Medical History:   Diagnosis Date    Cataracts, bilateral     Chronic pain     Diabetes ([facility] [address] 2/2/2011    Heart failure ([facility] [address]     Hypertension      Past Surgical History:   Procedure Laterality Date    HX HERNIA REPAIR      HX ORTHOPAEDIC      bilat hip replacement    HX PROSTATECTOMY       Prior Level of Function/Home Situation: Patient questionable historian, but reports he lives alone, family checks on him every few days, independence with ADLs, and ambulating Nati using rollator.    Personal factors and/or comorbidities impacting plan of care: lives alone    Home Situation  Living Alone: Yes  Support Systems: Family member(s)  Patient Expects to be Discharged to[de-identified] Rehabilitation facility  Current DME Used/Available at Home: [employer] [place]    EXAMINATION/PRESENTATION/DECISION MAKING:   Critical Behavior:              Hearing: Auditory  Auditory Impairment: None  Skin:    Edema:   Range Of Motion:  AROM: Generally decreased, functional           PROM: Generally decreased, functional           Strength:    Strength: Generally decreased, functional                    Tone & Sensation:   Tone: Normal              Sensation: Intact               Coordination:  Coordination: Generally decreased, functional  Vision:      Functional Mobility:  Bed Mobility:     Supine to Sit: (received in chair)  Sit to Supine: Maximum assistance;Assist x2  Scooting: Maximum assistance;Assist x2  Transfers:  Sit to Stand: Maximum assistance;Assist x2  Stand to Sit: Maximum assistance;Assist x2        Bed to Chair: Maximum assistance;Assist x2              Balance:   Sitting: Impaired  Sitting - Static: Poor (constant support)  Sitting - Dynamic: Poor (constant support)  Standing: Impaired; With support  Standing - Static: Fair;Constant support  Standing - Dynamic : Poor;Constant support  Ambulation/Gait Training:  Distance (ft): 30 Feet (ft)  Assistive Device: Gait belt;Walker, rollator  Ambulation - Level of Assistance: Minimal assistance(assist with walker management)        Gait Abnormalities: Decreased step clearance;Shuffling gait;Trunk sway increased; Path deviations(path deviation to left )        Base of Support: Narrowed     Speed/Seema: Shuffled;Pace decreased (<100 feet/min)  Step Length: Left shortened;Right shortened                Functional Measure:  Barthel Index:    Bathin  Bladder: 10  Bowels: 10  Groomin  Dressin  Feeding: 10  Mobility: 0  Stairs: 0  Toilet Use: 5  Transfer (Bed to Chair and Back): 5  Total: 45/100       Percentage of impairment   0%   1-19%   20-39%   40-59%   60-79%   80-99%   100%   Barthel Score 0-100 100 99-80 79-60 59-40 20-39 1-19   0     The Barthel ADL Index: Guidelines  1. The index should be used as a record of what a patient does, not as a record of what a patient could do. 2. The main aim is to establish degree of independence from any help, physical or verbal, however minor and for whatever reason. 3. The need for supervision renders the patient not independent. 4. A patient's performance should be established using the best available evidence. Asking the patient, friends/relatives and nurses are the usual sources, but direct observation and common sense are also important. However direct testing is not needed. 5. Usually the patient's performance over the preceding 24-48 hours is important, but occasionally longer periods will be relevant. 6. Middle categories imply that the patient supplies over 50 per cent of the effort. 7. Use of aids to be independent is allowed. Abiola Dawson., Barthel, DRoxW. ([id number]). Functional evaluation: the Barthel Index. [address] [facility] (14)2. Dl Hernandez addy KAVITHA Bonilla, Fercho Cartwright., Heather Ye., [place], [address] [place] [address] (1999). Measuring the change indisability after inpatient rehabilitation; comparison of the responsiveness of the Barthel Index and Functional [place] Measure. Journal of Neurology, Neurosurgery, and Psychiatry, 66(4), 382-315. Constance Loera, N.J.A, STEVE Mckenzie, & Terri Mao MVIRGEN. (2004.) Assessment of post-stroke quality of life in cost-effectiveness studies: The usefulness of the Barthel Index and the EuroQoL-5D.  Quality of Life Research, 15, 127-87            Physical Therapy Evaluation Charge Determination   History Examination Presentation Decision-Making   HIGH Complexity :3+ comorbidities / personal factors will impact the outcome/ POC  HIGH Complexity : 4+ Standardized tests and measures addressing body structure, function, activity limitation and / or participation in recreation  LOW Complexity : Stable, uncomplicated  LOW Complexity : FOTO score of       Based on the above components, the patient evaluation is determined to be of the following complexity level: LOW     Pain:  Pain Scale 1: Numeric (0 - 10)  Pain Intensity 1: 0              Activity Tolerance:   Fair  Please refer to the flowsheet for vital signs taken during this treatment. After treatment:   ?         Patient left in no apparent distress sitting up in chair  ? Patient left in no apparent distress in bed  ? Call bell left within reach  ? Nursing notified  ? Caregiver present  ? Bed alarm activated    COMMUNICATION/EDUCATION:   The patient?s plan of care was discussed with: Occupational Therapist and Registered Nurse. ?         Fall prevention education was provided and the patient/caregiver indicated understanding. ? Patient/family have participated as able in goal setting and plan of care. ?         Patient/family agree to work toward stated goals and plan of care. ?         Patient understands intent and goals of therapy, but is neutral about his/her participation. ? Patient is unable to participate in goal setting and plan of care.     Thank you for this referral.  Noemy Mcgowan, PT, DPT   Time Calculation: 27 mins

## 2019-06-16 LAB
GLIADIN PEPTIDE IGA SER-ACNC: 4 UNITS (ref 0–19)
GLIADIN PEPTIDE IGG SER-ACNC: 3 UNITS (ref 0–19)
GLUCOSE BLD STRIP.AUTO-MCNC: 126 MG/DL (ref 65–100)
GLUCOSE BLD STRIP.AUTO-MCNC: 165 MG/DL (ref 65–100)
GLUCOSE BLD STRIP.AUTO-MCNC: 69 MG/DL (ref 65–100)
GLUCOSE BLD STRIP.AUTO-MCNC: 90 MG/DL (ref 65–100)
GLUCOSE BLD STRIP.AUTO-MCNC: 96 MG/DL (ref 65–100)
IGA SERPL-MCNC: 324 MG/DL (ref 61–437)
SERVICE CMNT-IMP: ABNORMAL
SERVICE CMNT-IMP: ABNORMAL
SERVICE CMNT-IMP: NORMAL
TTG IGA SER-ACNC: <2 U/ML (ref 0–3)
TTG IGG SER-ACNC: 3 U/ML (ref 0–5)

## 2019-06-16 PROCEDURE — 65660000000 HC RM CCU STEPDOWN

## 2019-06-16 PROCEDURE — 96376 TX/PRO/DX INJ SAME DRUG ADON: CPT

## 2019-06-16 PROCEDURE — 74011250637 HC RX REV CODE- 250/637: Performed by: FAMILY MEDICINE

## 2019-06-16 PROCEDURE — 65390000012 HC CONDITION CODE 44 OBSERVATION

## 2019-06-16 PROCEDURE — 82962 GLUCOSE BLOOD TEST: CPT

## 2019-06-16 RX ADMIN — POTASSIUM CHLORIDE 10 MEQ: 750 TABLET, FILM COATED, EXTENDED RELEASE ORAL at 09:32

## 2019-06-16 RX ADMIN — THERA TABS 1 TABLET: TAB at 09:32

## 2019-06-16 RX ADMIN — Medication 10 ML: at 21:21

## 2019-06-16 RX ADMIN — Medication 10 ML: at 09:33

## 2019-06-16 RX ADMIN — AMLODIPINE BESYLATE 10 MG: 5 TABLET ORAL at 09:32

## 2019-06-16 RX ADMIN — Medication: at 09:00

## 2019-06-16 RX ADMIN — FUROSEMIDE 40 MG: 40 TABLET ORAL at 09:32

## 2019-06-16 RX ADMIN — SIMVASTATIN 20 MG: 20 TABLET, FILM COATED ORAL at 21:21

## 2019-06-16 NOTE — PROGRESS NOTES
Hospitalist Progress Note    NAME: Omaira Corey   :  1929   MRN:  [id number]       Assessment / Plan:    IMPRESSION AND PLAN:  1. Anemia - acute on chronic. Transfused 1 unit of packed red blood cells. Admit to telemetry. Stool occult blood test was performed, results are reportedly negative. Check iron profile, B12, folate levels. Hb stable 8.2, need cardiology clearance prior to Endoscopy. Cardiology advised . Balloon aortic valvuloplasty  . 2  Acute hypernatremia. Na 147 .  3. Dehydration. resolved. 4.  Generalized weakness and debility. Place on fall precautions. 5.  Type 2 diabetes mellitus. Place on Humalog insulin correction coverage schedule, Accu-Chek, and check hemoglobin A1c level. 6.  Hypertension. Resume back on home medications. 7.  Hyperlipidemia. Continue on simvastatin. 8. Chronic diastolic CHF, continue lasix   9. Venous thromboembolism prophylaxis. Sequential compression devices to lower extremities. Subjective:     Chief Complaint / Reason for Physician Visit  \"feeling ok today   \". Discussed with RN events overnight. Review of Systems:  Symptom Y/N Comments  Symptom Y/N Comments   Fever/Chills    Chest Pain     Poor Appetite    Edema     Cough    Abdominal Pain     Sputum    Joint Pain     SOB/JOSUE    Pruritis/Rash     Nausea/vomit    Tolerating PT/OT     Diarrhea    Tolerating Diet     Constipation    Other       Could NOT obtain due to:      Objective:     VITALS:   Last 24hrs VS reviewed since prior progress note.  Most recent are:  Patient Vitals for the past 24 hrs:   Temp Pulse Resp BP SpO2   19 0824 97.5 °F (36.4 °C) 67 16 117/59 96 %   19 0314 97.7 °F (36.5 °C) 69 18 126/71 96 %   06/15/19 2232 97.4 °F (36.3 °C) 61 18 135/69 97 %   06/15/19 2004 97.5 °F (36.4 °C) 77 18 136/56 99 %   06/15/19 1601 97.8 °F (36.6 °C) 69 16 140/73 100 %   06/15/19 1337 97.5 °F (36.4 °C) 73 16 136/57 100 %       Intake/Output Summary (Last 24 hours) at 6/16/2019 1204  Last data filed at 6/16/2019 0724  Gross per 24 hour   Intake 240 ml   Output 1125 ml   Net -885 ml        PHYSICAL EXAM:  General: WD, WN. Alert, cooperative, no acute distress    EENT:  EOMI. Anicteric sclerae. MMM  Resp:  CTA bilaterally, no wheezing or rales. No accessory muscle use  CV:  Regular  rhythm,  No edema ,systolic murmur   GI:  Soft, Non distended, Non tender.  +Bowel sounds  Neurologic:  Alert and oriented X 3, normal speech,   Psych:   Good insight. Not anxious nor agitated  Skin:  No rashes. No jaundice    Reviewed most current lab test results and cultures  YES  Reviewed most current radiology test results   YES  Review and summation of old records today    NO  Reviewed patient's current orders and MAR    YES  PMH/SH reviewed - no change compared to H&P  ________________________________________________________________________  Care Plan discussed with:    Comments   Patient     Family      RN     Care Manager     Consultant                        Multidiciplinary team rounds were held today with , nursing, pharmacist and clinical coordinator. Patient's plan of care was discussed; medications were reviewed and discharge planning was addressed. ________________________________________________________________________  Total NON critical care TIME:  35 Minutes    Total CRITICAL CARE TIME Spent:   Minutes non procedure based      Comments   >50% of visit spent in counseling and coordination of care     ________________________________________________________________________  Jo Mcintosh MD     Procedures: see electronic medical records for all procedures/Xrays and details which were not copied into this note but were reviewed prior to creation of Plan. LABS:  I reviewed today's most current labs and imaging studies.   Pertinent labs include:  Recent Labs     06/15/19  0240   WBC 8.0   HGB 9.2*   HCT 30.6*        Recent Labs     06/15/19  0240      K 3.9      CO2 29   GLU 93   BUN 15   CREA 1.04   CA 8.0*   ALB 2.3*   TBILI 0.3   SGOT 12*   ALT 11*       Signed: Stephanie Romero MD

## 2019-06-16 NOTE — PROGRESS NOTES
TRANSFER - IN REPORT:    Verbal report received from MIRIAM Lal(name) [place] [facility]  [place] from [facility](unit) for routine progression of care      Report consisted of patients Situation, Background, Assessment and   Recommendations(SBAR). Information from the following report(s) SBAR and Kardex was reviewed with the receiving nurse. Opportunity for questions and clarification was provided. Assessment completed upon patients arrival to unit and care assumed.

## 2019-06-16 NOTE — PROGRESS NOTES
Bedside shift change report given to Lizzette Roman (oncoming nurse) by Dev Hallman (offgoing nurse). Report included the following information SBAR, Kardex, MAR and Recent Results.

## 2019-06-16 NOTE — PROGRESS NOTES
HYPOGLYCEMIC EPISODE DOCUMENTATION    Patient with hypoglycemic episode(s) at 0910 (time) on 6/16/19 (date). BG value(s) pre-treatment 71    Was patient symptomatic? [] yes, [x] no  Patient was treated with the following rescue medications/treatments: [] D50                [] Glucose tablets                [] Glucagon                [x] 4oz juice                [] 6oz reg soda                [] 8oz low fat milk  BG value post-treatment: 96   Once BG treated and value greater than 80mg/dl, pt was provided with the following:  [] snack  [x] meal  Name of MD notified:Dr Tyshawn Meier  The following orders were received: Continue POC glucose checks.

## 2019-06-16 NOTE — PROGRESS NOTES
Problem: Falls - Risk of  Goal: *Absence of Falls  Description  Document Brittni Lucas Fall Risk and appropriate interventions in the flowsheet. Outcome: Progressing Towards Goal  Note:   Fall Risk Interventions:  Mobility Interventions: Communicate number of staff needed for ambulation/transfer         Medication Interventions: Patient to call before getting OOB    Elimination Interventions: Call light in reach, Patient to call for help with toileting needs              Problem: Pressure Injury - Risk of  Goal: *Prevention of pressure injury  Description  Document Eitan Scale and appropriate interventions in the flowsheet.   Note:   Pressure Injury Interventions:  Sensory Interventions: Discuss PT/OT consult with provider    Moisture Interventions: Apply protective barrier, creams and emollients, Maintain skin hydration (lotion/cream)    Activity Interventions: Pressure redistribution bed/mattress(bed type)    Mobility Interventions: HOB 30 degrees or less    Nutrition Interventions: Document food/fluid/supplement intake                     Problem: Patient Education: Go to Patient Education Activity  Goal: Patient/Family Education  Outcome: Not Progressing Towards Goal

## 2019-06-16 NOTE — PROGRESS NOTES
Bedside and Verbal shift change report given to STEPHANIE (oncoming nurse) by Anna Butler (offgoing nurse). Report included the following information SBAR, Kardex, ED Summary, Procedure Summary, Intake/Output, MAR and Cardiac Rhythm normal sinus.

## 2019-06-16 NOTE — PROGRESS NOTES
Problem: Falls - Risk of  Goal: *Absence of Falls  Description  Document Alem Cleveland Fall Risk and appropriate interventions in the flowsheet. Outcome: Progressing Towards Goal     Problem: Patient Education: Go to Patient Education Activity  Goal: Patient/Family Education  Outcome: Progressing Towards Goal     Problem: Pressure Injury - Risk of  Goal: *Prevention of pressure injury  Description  Document Eitan Scale and appropriate interventions in the flowsheet.   Outcome: Progressing Towards Goal     Problem: Patient Education: Go to Patient Education Activity  Goal: Patient/Family Education  Outcome: Progressing Towards Goal No

## 2019-06-16 NOTE — PROGRESS NOTES
[facility] Cardiology Progress Note    Date of service: 6/16/2019    Subjective:  Mr Booker has no new complaints at the moment    Objective:    Visit Vitals  /71 (BP 1 Location: Right arm, BP Patient Position: At rest)   Pulse 69   Temp 97.7 °F (36.5 °C)   Resp 18   Ht 5' 7\" (1.702 m)   Wt 78 kg (171 lb 15.3 oz)   SpO2 96%   BMI 26.93 kg/m²       Physical Exam   Constitutional: He is oriented to person, place, and time. He appears well-developed and well-nourished. HENT:   Head: Normocephalic and atraumatic. Eyes: Conjunctivae are normal. No scleral icterus. Neck: No JVD present. Cardiovascular: Normal rate and regular rhythm. Exam reveals no gallop. Murmur heard. Harsh midsystolic murmur is present with a grade of 3/6 at the upper right sternal border radiating to the neck. Pulmonary/Chest: Effort normal and breath sounds normal. No stridor. No respiratory distress. He has no wheezes. He has no rales. Abdominal: Soft. He exhibits no distension. Musculoskeletal: He exhibits no edema or deformity. Neurological: He is alert and oriented to person, place, and time. Skin: Skin is warm and dry. Psychiatric: He has a normal mood and affect. His behavior is normal.       Data reviewed:  Recent Results (from the past 12 hour(s))   GLUCOSE, POC    Collection Time: 06/15/19  9:21 PM   Result Value Ref Range    Glucose (POC) 128 (H) 65 - 100 mg/dL    Performed by Valeria Colvin      06/11/19   ECHO ADULT COMPLETE 06/14/2019 6/14/2019    Narrative · Mitral Valve: Mitral valve thickening. Trace mitral valve regurgitation. · Tricuspid Valve: Mild tricuspid valve regurgitation is present. · Pulmonary Artery: There is no evidence of pulmonary hypertension. · Left Ventricle: Severe concentric hypertrophy. Estimated left   ventricular ejection fraction is 66 - 70%. Visually measured ejection   fraction. No regional wall motion abnormality noted.   · Pulmonic Valve: Mild pulmonic valve regurgitation is present. · Aortic Valve: Severe aortic valve sclerosis with reduced excursion. Aortic valve leaflet calcification present. Critical aortic valve stenosis   is present. Signed by: Melanie Fuentes MD       Assessment:  1. Severe iron deficiency anemia - improved. Hgb 9.2  Unclear source but GI blood loss is suspect  GI following and hopes to evaluate with endoscopy  2. Severe (critical) aortic stenosis  Would make sedation for endoscopic procedures hazardous  3. Unintended weight loss  Concerning for possible underlying malignancy  4. CAD s/p PCI in 2014 - stable    Plan:  Continue current therapies for now  With underdetermined source of anemia and weight loss, he would currently be a cohort C patient in terms of thinking TAVR  It may be a reasonable option to consider BAV though as a way of decreasing severity of AS and making it safer to undergo full GI evaluation. Will continue to follow along and discuss with Dr Marco Licea Monday    Signed:  Zoraida Craig.  Ralph Patel MD  Interventional Cardiology  6/16/2019

## 2019-06-16 NOTE — PROGRESS NOTES
[address]  [address] [facility] [address] [place], [address]  [phone]                GI PROGRESS NOTE      NAME:   Jose Alberto Cm   :    1929   MRN:    [id number]     Subjective:     Pt seen eating meal. Denies any issues. Objective:     VITALS:   Last 24hrs VS reviewed since prior hospitalist progress note. Most recent are:  Visit Vitals  /59 (BP 1 Location: Right arm, BP Patient Position: At rest)   Pulse 67   Temp 97.5 °F (36.4 °C)   Resp 16   Ht 5' 7\" (1.702 m)   Wt 73.2 kg (161 lb 6 oz)   SpO2 96%   BMI 25.28 kg/m²       Intake/Output Summary (Last 24 hours) at 2019 1458  Last data filed at 2019 1418  Gross per 24 hour   Intake    Output 1125 ml   Net -1125 ml        PHYSICAL EXAM:  Full exam deferred  AAM, NAD       Lab Data   Recent Results (from the past 12 hour(s))   GLUCOSE, POC    Collection Time: 19  8:08 AM   Result Value Ref Range    Glucose (POC) 69 65 - 100 mg/dL    Performed by Corado Goodshahram    GLUCOSE, POC    Collection Time: 19  8:46 AM   Result Value Ref Range    Glucose (POC) 96 65 - 100 mg/dL    Performed by Corado Goodshahram    GLUCOSE, POC    Collection Time: 19 11:42 AM   Result Value Ref Range    Glucose (POC) 165 (H) 65 - 100 mg/dL    Performed by Mak Jiménez        Medications: Reviewed    Assessment/Plan     Jose Alberto Cm is a 80 y.o.  male who was admitted with anemia. He has critical aortic stenosis and and cardiology is following along re: options for AS. At present, it has been deemed too high risk for endoscopy. No evidence of active bleeding. Dr. Cynthia Don will continue to follow tomorrow.       Attending Physician: Nayeli Carter MD   Date/Time:  2019

## 2019-06-16 NOTE — PROGRESS NOTES
Spiritual Care Partner Volunteer visited patient in room 207/01 on 6.16.19. Documented by: : Rev. Sanjeev cShaefer; [facility], to contact [phone] [address] call: [phone]

## 2019-06-17 PROBLEM — I35.0 AORTIC STENOSIS: Status: ACTIVE | Noted: 2019-06-17

## 2019-06-17 PROBLEM — K92.2 GIB (GASTROINTESTINAL BLEEDING): Status: ACTIVE | Noted: 2019-06-17

## 2019-06-17 LAB
GLUCOSE BLD STRIP.AUTO-MCNC: 132 MG/DL (ref 65–100)
GLUCOSE BLD STRIP.AUTO-MCNC: 134 MG/DL (ref 65–100)
GLUCOSE BLD STRIP.AUTO-MCNC: 71 MG/DL (ref 65–100)
GLUCOSE BLD STRIP.AUTO-MCNC: 85 MG/DL (ref 65–100)
GLUCOSE BLD STRIP.AUTO-MCNC: 93 MG/DL (ref 65–100)
SERVICE CMNT-IMP: ABNORMAL
SERVICE CMNT-IMP: ABNORMAL
SERVICE CMNT-IMP: NORMAL

## 2019-06-17 PROCEDURE — 97535 SELF CARE MNGMENT TRAINING: CPT

## 2019-06-17 PROCEDURE — 74011250637 HC RX REV CODE- 250/637: Performed by: FAMILY MEDICINE

## 2019-06-17 PROCEDURE — 97116 GAIT TRAINING THERAPY: CPT

## 2019-06-17 PROCEDURE — 65390000012 HC CONDITION CODE 44 OBSERVATION

## 2019-06-17 PROCEDURE — 74011000250 HC RX REV CODE- 250: Performed by: FAMILY MEDICINE

## 2019-06-17 PROCEDURE — 82962 GLUCOSE BLOOD TEST: CPT

## 2019-06-17 PROCEDURE — 99218 HC RM OBSERVATION: CPT

## 2019-06-17 PROCEDURE — 97530 THERAPEUTIC ACTIVITIES: CPT

## 2019-06-17 RX ADMIN — AMLODIPINE BESYLATE 10 MG: 5 TABLET ORAL at 11:26

## 2019-06-17 RX ADMIN — Medication 10 ML: at 07:16

## 2019-06-17 RX ADMIN — FUROSEMIDE 40 MG: 40 TABLET ORAL at 11:31

## 2019-06-17 RX ADMIN — SIMVASTATIN 20 MG: 20 TABLET, FILM COATED ORAL at 23:02

## 2019-06-17 RX ADMIN — TIMOLOL MALEATE 1 DROP: 5 SOLUTION OPHTHALMIC at 11:33

## 2019-06-17 RX ADMIN — POTASSIUM CHLORIDE 10 MEQ: 750 TABLET, FILM COATED, EXTENDED RELEASE ORAL at 11:31

## 2019-06-17 RX ADMIN — TIMOLOL MALEATE 1 DROP: 5 SOLUTION OPHTHALMIC at 18:02

## 2019-06-17 RX ADMIN — Medication: at 11:32

## 2019-06-17 RX ADMIN — Medication 10 ML: at 23:02

## 2019-06-17 RX ADMIN — THERA TABS 1 TABLET: TAB at 09:00

## 2019-06-17 NOTE — ADT AUTH CERT NOTES
Anemia, Iron Deficiency or Unspecified - Care Day 6 (6/16/2019) by Mirta Raphael, RN  
 
   
Review Status Review Entered Completed 6/17/2019 13:15  
   
Criteria Review Care Day: 6 Care Date: 6/16/2019 Level of Care: Telemetry Guideline Day 1 Level Of Care (X) ICU[D]or floor 6/17/2019 13:15:07 EDT by Mirta Raphael TELE. Clinical Status ( ) * Clinical Indications met[E] Activity (X) Activity as tolerated 6/17/2019 13:15:07 EDT by Mirta Raphael AMBUALTE WITH ASSIST. OT AND PT CONSULTS. OOB IN CHAIR. Routes  
(X) Oral hydration, medications 6/17/2019 13:15:07 EDT by Mirta Raphael NORVASC 10MG PO QD. 
LASIX 40MG PO QD. KCL 10 MEQ PO QD. ZOCOR 20MG PO QHS. MVI PO QD. (X) Diet as tolerated 6/17/2019 13:15:07 EDT by Mirta Raphael CARDIAC REGULAR WITH SNACKS TID. Interventions  
(X) Hematologic evaluation[F] 6/17/2019 13:15:07 EDT by Mirta Raphael DONE.   
  
(X) Possible endoscopy 6/17/2019 13:15:07 EDT by Mirta Raphael   
need cardiology clearance prior to Endoscopy. Cardiology advised . Balloon aortic valvuloplasty. (X) Possible transfusion 6/17/2019 13:15:07 EDT by Mirta Raphael ONE UNIT ON ADMIT. * Milestone Additional Notes 6/16/19  
  
98.2 P 69 RR 16 SPO2 99% /45. FULL CODE. SCDS. I+O. CONTINUOUS VS Q4H. GLUCOSE MANAGEMENT PER PROTOCOL. SPOT CHECK OXIMETRY. SKIN CARE AND CHECKS. SKIN CARE PRECAUTIONS. POC GLUCOSE QACHS. WOUND CARE QOD LEFT POSTERIOR LEG. CARDIOLOGY> Assessment: 1. Severe iron deficiency anemia - improved. Hgb 9.2 Unclear source but GI blood loss is suspect GI following and hopes to evaluate with endoscopy 2. Severe (critical) aortic stenosis Would make sedation for endoscopic procedures hazardous 3. Unintended weight loss Concerning for possible underlying malignancy 4. CAD s/p PCI in 2014 - stable  
   
Plan:  
Continue current therapies for now With underdetermined source of anemia and weight loss, he would currently be a cohort C patient in terms of thinking TAVR It may be a reasonable option to consider BAV though as a way of decreasing severity of AS and making it safer to undergo full GI evaluation. INTERNAL MED> IMPRESSION AND PLAN:  
1.  Anemia - acute on chronic.  Transfused 1 unit of packed red blood cells.  Admit to telemetry.  Stool occult blood test was performed, results are reportedly negative.  Check iron profile, B12, folate levels. Hb stable 8.2, need cardiology clearance prior to Endoscopy. Cardiology advised . Balloon aortic valvuloplasty Rosanne Chess 2  Acute hypernatremia. Na 147 . 3.  Dehydration. resolved. 4.  Generalized weakness and debility.  Place on fall precautions. 5.  Type 2 diabetes mellitus.  Place on Humalog insulin correction coverage schedule, Accu-Chek, and check hemoglobin A1c level. 6.  Hypertension.  Resume back on home medications. 7.  Hyperlipidemia.  Continue on simvastatin. 8. Chronic diastolic CHF, continue lasix 9.  Venous thromboembolism prophylaxis.  Sequential compression devices to lower extremities.  
   
GI>Alber BONNER Leonel Adams is a 80 y.o.  male who was admitted with anemia. He has critical aortic stenosis and and cardiology is following along re: options for AS. At present, it has been deemed too high risk for endoscopy. No evidence of active bleeding.   
   
  
  
   
Anemia, Iron Deficiency or Unspecified - Care Day 5 (6/15/2019) by Carlyn Kearney RN  
 
   
Review Status Review Entered Completed 6/17/2019 10:52  
   
Criteria Review Care Day: 5 Care Date: 6/15/2019 Level of Care: Telemetry Guideline Day 1 Level Of Care (X) ICU[D]or floor Clinical Status ( ) * Clinical Indications met[E] Activity (X) Activity as tolerated 6/17/2019 10:52:34 EDT by Carlyn Kearney AMBULATE WITH ASSIST. OT AND PT CONSULTS. OOB IN CHAIR. Routes (X) Oral hydration, medications 6/17/2019 10:52:34 EDT by Steffi Huertas NORVASC 10MG PO QD. 
LASIX 40MG PO QD. KCL 10 MEQ PO QD. ZOCOR 20MG PO QHS. MVI PO QD. 
VENOFER 300MG IV Q24H. (X) Diet as tolerated 6/17/2019 10:52:34 EDT by Steffi Huertas CARDIAC REGULAR. SNACKS TID. Interventions  
(X) Hematologic evaluation[F] 6/17/2019 10:52:34 EDT by Steffi Huertas HEMATOLOGY FOLLOWING.   
  
(X) Laboratory tests 6/17/2019 10:52:34 EDT by Steffi Huertas RBC 3.35. HGB 9.2. HCT 30.6. RDW 18.5. ANION GAP 4. CA 8. ALB 2.3. GLOB 4.3. ALT 11. AST 12. (X) Possible endoscopy 6/17/2019 10:52:34 EDT by Steffi Huertas ONCE MEDICALLY CLEARED. (X) Possible transfusion 6/17/2019 10:52:34 EDT by Steffi Huertas ONE UNIT ON ADMIT. * Milestone Additional Notes 6/15/19 TEMP 97.5 P 73 RR 16 /57 SPO2 100%. FULL CODE. SCDS. I+O. CONTINUOUS VS Q4H. GLUCOSE MANAGEMENT PER PROTOCOL. SPOT CHECK OXIMETRY. SKIN CARE AND CHECKS. SKIN CARE PRECAUTIONS. POC GLUCOSE QACHS. WOUND CARE QOD LEFT POSTERIOR LEG. INTERNAL MED> IMPRESSION AND PLAN:  
1.  Anemia - acute on chronic.  Transfused 1 unit of packed red blood cells.  Admit to telemetry.  Stool occult blood test was performed, results are reportedly negative.  Check iron profile, B12, folate levels. Hb stable 8.2, need cardiology clearance prior to Endoscopy. Cardiology advised . Balloon aortic valvuloplasty Ofelia Shadow 2  Acute hypernatremia. Na 147 . 3.  Dehydration. resolved. 4.  Generalized weakness and debility.  Place on fall precautions. 5.  Type 2 diabetes mellitus.  Place on Humalog insulin correction coverage schedule, Accu-Chek, and check hemoglobin A1c level. 6.  Hypertension.  Resume back on home medications. 7.  Hyperlipidemia.  Continue on simvastatin. 8.  Venous thromboembolism prophylaxis.  Sequential compression devices to lower extremities. CARDIOLOGY> Assessment: 1. Severe iron deficiency anemia Unclear source but GI blood loss is suspect GI following and hopes to evaluate with endoscopy 2. Severe (critical) aortic stenosis Would make sedation for endoscopic procedures hazardous 3. Unintended weight loss Concerning for possible underlying malignancy 4. CAD s/p PCI in 2014  
   
Plan:  
Continue current therapies for now With underdetermined source of anemia and weight loss, he would currently be a cohort C patient in terms of thinking TAVR It may be a reasonable option to consider BAV though as a way of decreasing severity of AS and making it safer to undergo full GI evaluation.

## 2019-06-17 NOTE — DIABETES MGMT
[facility]    [facility] Progress Note    Recommendations/ Comments: Chart reviewed due to hypoglycemia. Pt has correction scale with high sensitivity ordered but has not required any. Noted po intake 100%. If appropriate, please consider, if hypoglycemia persists, consider adding D5. Current hospital DM medication: correction scale Humalog with high sensitivity     Chart reviewed on Alber Wallace. Patient is a 80 y.o. male with known diabetes on no diabetes medications at home. A1c:   Lab Results   Component Value Date/Time    Hemoglobin A1c <3.5 (L) 06/12/2019 03:50 AM       Recent Glucose Results:   Lab Results   Component Value Date/Time    GLUCPOC 85 06/17/2019 06:22 AM    GLUCPOC 71 06/17/2019 06:05 AM    GLUCPOC 126 (H) 06/16/2019 09:25 PM        Lab Results   Component Value Date/Time    Creatinine 1.04 06/15/2019 02:40 AM     Estimated Creatinine Clearance: 45 mL/min (based on SCr of 1.04 mg/dL). Active Orders   Diet    DIET CARDIAC Regular        PO intake:   Patient Vitals for the past 72 hrs:   % Diet Eaten   06/17/19 0933 100 %   06/15/19 1337 100 %   06/15/19 0745 100 %       Will continue to follow as needed.     Thank you    Silvestre Ayala RD, CDE        Time spent: 5 minutes

## 2019-06-17 NOTE — PROGRESS NOTES
Transition of Care Plan: Dc to Acute Rehab vs SNF      Chart reviewed. PT/OT recommend inpatient rehab at [place]. Cm met with patient and his friend from [facility]. Patient has been to [address] [place] [address] now known as [facility] in past and is interested in returning there if possible. Cm placed referral to [facility] today, will await their review and acceptance decision. If they deny will need to talk with patient about SNF. Unsure of dc date.      Marjan Soni, [facility]

## 2019-06-17 NOTE — PROGRESS NOTES
Problem: Self Care Deficits Care Plan (Adult)  Goal: *Acute Goals and Plan of Care (Insert Text)  Description  Occupational Therapy Goals  Initiated 6/15/2019  1. Patient will perform upper body dressing with supervision/set-up within 7 day(s). 2.  Patient will perform lower body dressing with minimal assistance/contact guard assist within 7 day(s). 3.  Patient will perform seated bathing with minimal assistance/contact guard assist within 7 day(s). MET with set up for upper body 6/17/19.  4.  Patient will perform toilet transfers with supervision/set-up within 7 day(s). 5.  Patient will perform all aspects of toileting with minimal assistance/contact guard assist within 7 day(s). Outcome: Progressing Towards Goal   OCCUPATIONAL THERAPY TREATMENT  Patient: Yessy Piedra (5 y.o. male)  Date: 6/17/2019  Diagnosis: Anemia [D64.9]  Dehydration [E86.0]  Acute hypernatremia [E87.0]  Abnormal EKG [R94.31] <principal problem not specified>  Procedure(s) (LRB):  ESOPHAGOGASTRODUODENOSCOPY (EGD) (N/A)  COLONOSCOPY (N/A) 4 Days Post-Op  Precautions: Fall  Chart, occupational therapy assessment, plan of care, and goals were reviewed. ASSESSMENT:   The patient presents with Setup to min assist upper body ADLs, Maximum assistance and Total assistance lower body ADLs, and Minimum assistance of 1 for mobility with rollator once in standing and  Maximum assistance Assist x2  for sit to/from stand. The following are barriers to ADL independence while in acute care:   - Cognitive and/or behavioral: none   - Medical condition: ROM, functional reach, functional endurance, sitting balance, standing balance, vision and medical history, limited bilateral shoulder AROM. - Other: Limited active and passive flexion at hips and knees, impacting sit to/from stand. Uses rollator at baseline. Patient highly motivated to independence. Prior level of function:  lives home alone, uses his rollator.  Family checks in periodically. Pt reports being able to perform his own ADLs and IADLs to include standing in the kitchen to cook. Reports he has assist for transportation to MD appointments and a friend who picks up groceries for him. PLAN:  Patient continues to benefit from skilled intervention to address the above impairments. Continue treatment per established plan of care. Recommend with staff: Brandan Farias in chair for all meals. Toileting on BSC  (limited space in bathroom) with max assist of 2 for sit to/from stand, Self care with assist for LEs. Recommend next OT session: LE self care, transfer to [facility] versus toilet  Discharge recommendations: Rehab at inpatient facility: patient can tolerate 3 hours of therapy (to regain functional baseline patient requires rehab)  If above is not an option then recommend: Rehab at inpatient facility: patient can tolerate 3 hours of therapy (to regain functional baseline patient requires rehab)    Barriers to discharging home, in addition to above listed impairments: lives alone  total assist driving to follow up medical appointment(s)/groceries/obtain medication  entry and exit into the home, patient will require physical assist from medical transport/ambulance  level of physical assist required to maintain patient safety. Equipment recommendations for successful discharge (if) home: none      SUBJECTIVE:   Patient stated ? I have someone who goes and gets my groceries now.?    OBJECTIVE DATA SUMMARY:   Cognitive/Behavioral Status:  Neurologic State: Alert  Orientation Level: Oriented X4  Cognition: Appropriate for age attention/concentration; Follows commands  Perception: Appears intact  Perseveration: No perseveration noted  Safety/Judgement: Awareness of environment    Functional Mobility and Transfers for ADLs:  Bed Mobility:       Transfers:  Sit to Stand: Assist x2;Maximum assistance  Functional Transfers  Toilet Transfer : Maximum assistance;Assist x2(for sit to/from stand; inferred from mobility)  Adaptive Equipment: Bedside commode       Balance:  Sitting: Impaired  Sitting - Static: Good (unsupported)  Sitting - Dynamic: Fair (occasional)  Standing: Impaired; With support  Standing - Static: Fair  Standing - Dynamic : Fair    ADL Intervention:       Grooming  Washing Face: Set-up(seated)    Upper Body Bathing  Bathing Assistance: Set-up  Position Performed: Seated in chair    Lower Body Bathing  Perineal  : Contact guard assistance  Position Performed: Standing  Adaptive Equipment: (rollator)  Lower Body : Maximum assistance  Position Performed: Seated in chair  Cues: Physical assistance  Adaptive Equipment: Long handled sponge(reports he has one at home)    Upper [address]: Minimum  assistance    Lower Body Dressing Assistance  Pants With Elastic Waist: Total assistance(dependent)(inferred from mobility)  Socks: Total assistance (dependent)  Leg Crossed Method Used: No  Position Performed: Bending forward method;Seated in chair;Standing  Adaptive Equipment Used: (rollator)         Cognitive Retraining  Safety/Judgement: Awareness of environment      Activity Tolerance:   Fair  Please refer to the flowsheet for vital signs taken during this treatment.     After treatment patient left:   Bed/Chair-wheels locked  Call light within reach  RN notified     COMMUNICATION/COLLABORATION:   The patient?s plan of care was discussed with: Physical Therapist and Registered Nurse    YARELIS Hanna  Time Calculation: 31 mins

## 2019-06-17 NOTE — PROGRESS NOTES
Results for Rell Daigle (MRN [id number]) as of 6/17/2019 07:18   Ref. Range 6/17/2019 06:05   GLUCOSE,FAST - POC Latest Ref Range: 65 - 100 mg/dL 71     BS 71 at 0600, cup of orange juice given. BS rechecked after 15 min, was  85.

## 2019-06-17 NOTE — PROGRESS NOTES
Bedside shift change report given to [facility]. RN (oncoming nurse) by Meena Monte RN (offgoing nurse). Report included the following information SBAR, Kardex, MAR and Recent Results.

## 2019-06-17 NOTE — PROGRESS NOTES
Problem: Mobility Impaired (Adult and Pediatric)  Goal: *Acute Goals and Plan of Care (Insert Text)  Description  Physical Therapy Goals  Initiated 6/15/2019  1. Patient will move from supine to sit and sit to supine , scoot up and down and roll side to side in bed with moderate assistance  within 7 day(s). 2.  Patient will transfer from bed to chair and chair to bed with moderate assistance  using the least restrictive device within 7 day(s). 3.  Patient will perform sit to stand with moderate assistance  within 7 day(s). 4.  Patient will ambulate with minimal assistance/contact guard assist for 150 feet with the least restrictive device within 7 day(s). Outcome: Progressing Towards Goal  PHYSICAL THERAPY TREATMENT  Patient: Vikki Frazier (43 y.o. male)  Date: 6/17/2019  Diagnosis: Anemia [D64.9]  Dehydration [E86.0]  Acute hypernatremia [E87.0]  Abnormal EKG [R94.31] <principal problem not specified>  Procedure(s) (LRB):  ESOPHAGOGASTRODUODENOSCOPY (EGD) (N/A)  COLONOSCOPY (N/A) 4 Days Post-Op  Precautions: Fall  Chart, physical therapy assessment, plan of care and goals were reviewed. ASSESSMENT:  Based on the objective data described below, the patient presents with Maximum assistance and Assist x2 level overall for transfers - secondary to unable to flex at hips  during sit to/from stand. Gait training completed at Contact guard assistance, 100 feet and using a rollator. The following are barriers to independence while in acute care:   -Cognitive and/or behavioral:   -Medical condition: strength, functional endurance, standing balance and vision    -Other:       Prior level of function: Lived independently, lift chair     PLAN:  Patient continues to benefit from skilled intervention to address the above impairments. Continue treatment per established plan of care.   Recommend with staff: 2 person assist for transfers - sit to/from bed, bedside chair or BSC/toilet  Discharge recommendations: Rehab at inpatient facility: patient can tolerate 3 hours of therapy (to regain functional baseline patient requires rehab)  If above is not an option then recommend: Rehab at skilled nursing facility (SNF) (to regain functional baseline patient requires rehab)    Patient's barriers to discharging home, in addition to above impairments: lives alone  level of physical assist required to maintain patient safety. Equipment recommendations for successful discharge (if) home: n/a        SUBJECTIVE:   Patient stated ? I want to sit up in the chair -- and walk ---  I'm getting so stiff. ?    OBJECTIVE DATA SUMMARY:   Critical Behavior:  Neurologic State: Alert  Orientation Level: Oriented X4  Cognition: Appropriate for age attention/concentration, Follows commands  Safety/Judgement: Awareness of environment  Functional Mobility Training:  Bed Mobility:     Supine to Sit: Moderate assistance;Assist x1   Unable to flex in sitting to lean forward - sitting edge of bed, or in bedside chair           Transfers:  Sit to Stand: Assist x2;Maximum assistance  Stand to Sit: Assist x2;Maximum assistance        Bed to Chair: Maximum assistance;Assist x2                    Balance:  Sitting: Impaired  Sitting - Static: Fair (occasional)  Sitting - Dynamic: Fair (occasional)(Unable to lean forward in sitting)  Standing: Impaired; With support  Standing - Static: Fair  Standing - Dynamic : Fair  Ambulation/Gait Training:  Distance (ft): 100 Feet (ft)  Assistive Device: Gait belt;Walker, rolling  Ambulation - Level of Assistance: Contact guard assistance; Additional time        Gait Abnormalities: Decreased step clearance(decreased stride)        Base of Support: Narrowed     Speed/Seema: Shuffled; Slow  Step Length: Left shortened;Right shortened              Pain:  Pt denied pain. Activity Tolerance:   Fair and requires rest breaks  Please refer to the flowsheet for vital signs taken during this treatment.     After treatment patient left:   Up in chair  Call light within reach  RN notified     COMMUNICATION/COLLABORATION:   The patient?s plan of care was discussed with: Registered Nurse    Christian Joseph, PT   Time Calculation: 25 mins

## 2019-06-17 NOTE — PROGRESS NOTES
Hospitalist Progress Note    NAME: Tushar Julien   :  1929   MRN:  [id number]       Assessment / Plan:    IMPRESSION AND PLAN:  1. Anemia - acute on chronic. Transfused 1 unit of packed red blood cells. Admit to telemetry. Stool occult blood test was performed, results are reportedly negative. Check iron profile, B12, folate levels. Hb stable 8.2, need cardiology clearance prior to Endoscopy. Cardiology advised . Balloon aortic valvuloplasty  . 2  Acute hypernatremia. Na 147 .  3. Dehydration. resolved. 4.  Generalized weakness and debility. Place on fall precautions. 5.  Type 2 diabetes mellitus. Place on Humalog insulin correction coverage schedule, Accu-Chek, and check hemoglobin A1c level. 6.  Hypertension. Resume back on home medications. 7.  Hyperlipidemia. Continue on simvastatin. 8. Chronic diastolic CHF, continue lasix   9. Venous thromboembolism prophylaxis. Sequential compression devices to lower extremities. 10: Disposition; Rehab at inpatient facility, cardiology Dr. Shravan Swain to follow and evaluate for BAV          Subjective:     Chief Complaint / Reason for Physician Visit  \"no complaints today   \". Discussed with RN events overnight. Review of Systems:  Symptom Y/N Comments  Symptom Y/N Comments   Fever/Chills    Chest Pain     Poor Appetite    Edema     Cough    Abdominal Pain     Sputum    Joint Pain     SOB/JOSUE    Pruritis/Rash     Nausea/vomit    Tolerating PT/OT     Diarrhea    Tolerating Diet     Constipation    Other       Could NOT obtain due to:      Objective:     VITALS:   Last 24hrs VS reviewed since prior progress note.  Most recent are:  Patient Vitals for the past 24 hrs:   Temp Pulse Resp BP SpO2   19 0751 98.3 °F (36.8 °C) 68 16 116/64 96 %   19 0312 98.6 °F (37 °C) 66 16 97/54 98 %   19 2112 98.2 °F (36.8 °C) 69 16 103/45 99 %   19 1800 98.4 °F (36.9 °C) 70 16 127/66 98 %       Intake/Output Summary (Last 24 hours) at 6/17/2019 1349  Last data filed at 6/17/2019 1247  Gross per 24 hour   Intake 560 ml   Output 855 ml   Net -295 ml        PHYSICAL EXAM:  General: WD, WN. Alert, cooperative, no acute distress    EENT:  EOMI. Anicteric sclerae. MMM  Resp:  CTA bilaterally, no wheezing or rales. No accessory muscle use  CV:  Regular  rhythm,  No edema ,systolic murmur   GI:  Soft, Non distended, Non tender.  +Bowel sounds  Neurologic:  Alert and oriented X 3, normal speech,   Psych:   Good insight. Not anxious nor agitated  Skin:  No rashes. No jaundice    Reviewed most current lab test results and cultures  YES  Reviewed most current radiology test results   YES  Review and summation of old records today    NO  Reviewed patient's current orders and MAR    YES  PMH/SH reviewed - no change compared to H&P  ________________________________________________________________________  Care Plan discussed with:    Comments   Patient     Family      RN     Care Manager     Consultant                        Multidiciplinary team rounds were held today with , nursing, pharmacist and clinical coordinator. Patient's plan of care was discussed; medications were reviewed and discharge planning was addressed. ________________________________________________________________________  Total NON critical care TIME:  35 Minutes    Total CRITICAL CARE TIME Spent:   Minutes non procedure based      Comments   >50% of visit spent in counseling and coordination of care     ________________________________________________________________________  Jacques Seth MD     Procedures: see electronic medical records for all procedures/Xrays and details which were not copied into this note but were reviewed prior to creation of Plan. LABS:  I reviewed today's most current labs and imaging studies.   Pertinent labs include:  Recent Labs     06/15/19  0240   WBC 8.0   HGB 9.2*   HCT 30.6*        Recent Labs     06/15/19  0240      K 3.9      CO2 29   GLU 93   BUN 15   CREA 1.04   CA 8.0*   ALB 2.3*   TBILI 0.3   SGOT 12*   ALT 11*       Signed: Rehan Hagen MD

## 2019-06-17 NOTE — ADT AUTH CERT NOTES
Anemia, Iron Deficiency or Unspecified - Care Day 4 (6/14/2019) by Steffi Huertas RN  
 
   
Review Status Review Entered Completed 6/14/2019 16:31  
   
Criteria Review Care Day: 4 Care Date: 6/14/2019 Level of Care: Telemetry Guideline Day 1 Level Of Care (X) ICU[D]or floor 6/14/2019 16:31:59 EDT by Steffi Huertas TELEMETRY. Clinical Status ( ) * Clinical Indications met[E] Activity (X) Activity as tolerated 6/14/2019 16:31:59 EDT by Steffi Huertas AMBULATE WITH ASSIST. OT AND PT CONSULTS. OOB IN CHAIR. Routes  
(X) Oral hydration, medications 6/14/2019 16:31:59 EDT by Steffi Huertas NORVASC 10MG PO QD. LASIX 40MG PO QD. KCL 10 MEQ PO QD.ZOCOR 20MG PO QHS. MVI ONE PO QD. (X) Diet as tolerated 6/14/2019 16:31:59 EDT by Steffi Huertas CARDIAC REGULAR. Interventions (X) Possible endoscopy 6/14/2019 16:31:59 EDT by Steffi Huertas Aortic stenosis: Today's echo confirms that the patient has critical aortic stenosis: peak velocity from apex, 5 m/s; mean gradient 45 mmHg; SETH 0.4 cm2; EF is preserved. High risk for cardiac complications from anesthesia and endoscopy procedure (X) Possible transfusion 6/14/2019 16:31:59 EDT by Steffi Huertas ONE UNIT ON ADMIT. * Milestone Additional Notes 6/14/19 TEMP 97.6 P 78 RR 16 /73 SPO2 100%. ECHO> · Mitral Valve: Mitral valve thickening. Trace mitral valve regurgitation. · Tricuspid Valve: Mild tricuspid valve regurgitation is present. · Pulmonary Artery: There is no evidence of pulmonary hypertension. · Left Ventricle: Severe concentric hypertrophy. Estimated left ventricular ejection fraction is 66 - 70%. Visually measured ejection fraction. No regional wall motion abnormality noted. · Pulmonic Valve: Mild pulmonic valve regurgitation is present. · Aortic Valve: Severe aortic valve sclerosis with reduced excursion. Aortic valve leaflet calcification present. Critical aortic valve stenosis is present. STOOL FOR OCCULT BLOOD DAILY. FULL CODE. SCDS. I+O. CONTINUOUS VS Q4H. GLUCOSE MANAGEMENT PER PROTOCOL. SPOT CHECK OXIMETRY. SKIN CARE AND CHECKS. SKIN CARE PRECAUTIONS. WOUND CARE QOD LEFT POSTERIOR LEG.   
  
GI> 1. Severe iron deficiency anemia - unclear etiology, r/o occult GI bleed including ulcer disease, AVMs, neoplasia, etc. Occult stool (-). Hgb stable. No overt bleeding.   
- Supportive management per primary team  
- Continue PPI  
- Hematology consult - Plan for EGD/colonoscopy once cleared by cardiology   
- Monitor H&H, transfuse as needed  
   
2. CAD 3. Aortic valve disease 4. HTN 5. CKD INTERNAL MED> IMPRESSION AND PLAN:  
1.  Anemia - acute on chronic.  Transfused 1 unit of packed red blood cells.  Admit to telemetry.  Stool occult blood test was performed, results are reportedly negative.  Check iron profile, B12, folate levels. Hb stable 8.2, need cardiology clearance prior to Endoscopy. .2  Acute hypernatremia. Na 147 . 3.  Dehydration. resolved. 4.  Generalized weakness and debility.  Place on fall precautions. 5.  Type 2 diabetes mellitus.  Place on Humalog insulin correction coverage schedule, Accu-Chek, and check hemoglobin A1c level. 6.  Hypertension.  Resume back on home medications. 7.  Hyperlipidemia.  Continue on simvastatin. 8.  Venous thromboembolism prophylaxis.  Sequential compression devices to lower extremities.  
   
CARDIOLOGY> Assessment:  
   
Active Problems:  
  Anemia (2/7/2019)    
  Dehydration (6/11/2019)    
  Acute hypernatremia (6/11/2019)    
  Abnormal EKG (6/11/2019)    
   
   
Plan:  
   
Aortic stenosis: Today's echo confirms that the patient has critical aortic stenosis: peak velocity from apex, 5 m/s; mean gradient 45 mmHg; SETH 0.4 cm2; EF is preserved  
   
CAD: stable no angina  
   
HTN: wellcontrolled  
   
 Rhythm: sinus rhythm  
   
Volume status:euvolemic Renal function: stable  
  High risk for cardiac complications from anesthesia and endoscopy procedures  
  
  
  
   
Anemia, Iron Deficiency or Unspecified - Care Day 3 (6/13/2019) by Dilshad Woods RN  
 
   
Review Status Review Entered Completed 6/14/2019 16:23  
   
Criteria Review Care Day: 3 Care Date: 6/13/2019 Level of Care: Telemetry Guideline Day 1 Level Of Care (X) ICU[D]or floor Clinical Status ( ) * Clinical Indications met[E] Activity (X) Activity as tolerated 6/14/2019 16:23:06 EDT by Dilshad Woods AMBULATE WITH ASSIST. OT AND PT CONSULTS. Routes  
(X) Oral hydration, medications 6/14/2019 16:23:06 EDT by Dilshad Woods NORVASC 10MG PO QD. LASIX 40MG PO QD. KCL 10 MEQ PO QD.ZOCOR 20MG PO QHS. MVI ONE PO QD. ( ) Diet as tolerated 6/14/2019 16:23:06 EDT by Dilshad Woods   
NPO. Interventions (X) Laboratory tests 6/14/2019 16:23:06 EDT by Dilshad Woods RBC 2.93. HGB 8.2. HCT27.3. RDW 20.1. . .  
CA 8.4. ALB 2.4. GLOB 4.3. ALT 10. AST 12. (X) Possible endoscopy 6/14/2019 16:23:06 EDT by Dilshad Woods Plan for EGD/colonoscopy once cleared by cardiology. (X) Possible transfusion 6/14/2019 16:23:06 EDT by Dilshad Woods Transfused 1 unit of packed red blood cells. * Milestone Additional Notes 6/13/19 TEMP 97.7 P 70 RR 18 SPO2 100% /48. STOOL FOR OCCULT BLOOD DAILY. HEMATOLOGY FOLLOWING. FULL CODE. SCDS. I+O. CONTINUOUS VS Q4H. GLUCOSE MANAGEMENT PER PROTOCOL. SPOT CHECK OXIMETRY. SKIN CARE AND CHECKS. SKIN CARE PRECAUTIONS. WOUND CARE QOD LEFT POSTERIOR LEG. INTERNAL MED> IMPRESSION AND PLAN:  
1.  Anemia - acute on chronic.  Transfused 1 unit of packed red blood cells.  Admit to telemetry.  Stool occult blood test was performed, results are reportedly negative.  Check iron profile, B12, folate levels. Hb stable 8.2, need cardiology clearance prior to Endoscopy. .2  Acute hypernatremia. Na 147 . 3.  Dehydration. resolved. 4.  Generalized weakness and debility.  Place on fall precautions. 5.  Type 2 diabetes mellitus.  Place on Humalog insulin correction coverage schedule, Accu-Chek, and check hemoglobin A1c level. 6.  Hypertension.  Resume back on home medications. 7.  Hyperlipidemia.  Continue on simvastatin. 8.  Venous thromboembolism prophylaxis.  Sequential compression devices to lower extremities. CARDIOLOGY> The patient's last echocardiogram was in 2015 here at [facility] and demonstrated severe aortic stenosis with the following parameters, aortic valve area 0.88 cm2, peak velocity is 4.2 meters per second, with mean aortic valve gradient of 41 mmHg.  His ejection fraction has always been well preserved and he does not have signs and symptoms of decompensated congestive heart failure.  In general, he is also maintaining sinus rhythm.  He has always declined transcatheter aortic valve replacement or open surgery for his valve and has always understood the consequences of not having this done.  
   
I suspect his valve is now critically stenosed the whole way for the echocardiogram.  A cath is booked and ordered, but not yet done and we will make a disposition. Waiting for echo to be done to make a disposition/risk stratification on Mr Gabbi Tran He has been stable today   
   
  
GI> 1. Severe iron deficiency anemia - unclear etiology, r/o occult GI bleed including ulcer disease, AVMs, neoplasia, etc. Occult stool (-). Hgb stable. No overt bleeding.   
- Clear liquids as tolerated - Supportive management per primary team  
- Continue PPI  
- Plan for EGD/colonoscopy once cleared by cardiology - Monitor H&H, transfuse as needed  
   
2. CAD 3. Aortic valve disease 4. HTN 5. CKD

## 2019-06-17 NOTE — PROGRESS NOTES
6/17/2019     Admit Date: 6/11/2019    Admit Diagnosis: Anemia [D64.9]  Dehydration [E86.0]  Acute hypernatremia [E87.0]  Abnormal EKG [R94.31]  GIB (gastrointestinal bleeding) [K92.2]  Aortic stenosis [I35.0]    Active Problems:    Anemia (2/7/2019)      Dehydration (6/11/2019)      Acute hypernatremia (6/11/2019)      Abnormal EKG (6/11/2019)      Aortic stenosis (6/17/2019)      GIB (gastrointestinal bleeding) (6/17/2019)        Assessment/Plan:  Severe aortic stenosis, calcific with preserved LV systolic function  Anemia and profound weight loss of unknown cause  Severe AS represents a barrier to GI evaluation for his primary problem. I have discussed balloon aortic angioplasty as a way to reduce his AV gradient to allow him to have a GI evaluation more safely. The patient agrees to this. Will plan to proceed tomorrow     Subjective:  Feels ok today. Tushar Julien denies chest pain, chest pressure/discomfort, dyspnea, orthopnea. Objective:     Visit Vitals  /59 (BP 1 Location: Left arm, BP Patient Position: Sitting)   Pulse 81   Temp 97.8 °F (36.6 °C)   Resp 16   Ht 5' 7\" (1.702 m)   Wt 73.5 kg (162 lb 0.6 oz)   SpO2 100%   BMI 25.38 kg/m²        Physical Exam:  Neck: supple, symmetrical, trachea midline, no adenopathy, thyroid: not enlarged, symmetric, no tenderness/mass/nodules, no carotid bruit and no JVD  Heart: regular rate and rhythm, S1, S2 normal, systolic murmur: systolic ejection 3/6, musical at 2nd left intercostal space, at 2nd right intercostal space, at lower left sternal border, at apex  Lungs: clear to auscultation bilaterally, normal percussion bilaterally  Abdomen: soft, non-tender. Bowel sounds normal. No masses,  no organomegaly  Extremities: extremities normal, atraumatic, no cyanosis or edema  Pulses: 2+ and symmetric  Neurologic: Grossly normal      Labs:    No results for input(s): CPK, CKMB, TROIQ in the last 72 hours.     No lab exists for component: CKQMB, CPKMB  Recent Labs     06/15/19  0240      K 3.9      BUN 15   CREA 1.04   GLU 93   CA 8.0*     Recent Labs     06/15/19  0240   WBC 8.0   HGB 9.2*   HCT 30.6*        No results for input(s): CHOL, LDLC in the last 72 hours.     No lab exists for component: TGL, HDLC,  HBA1C        Data Review: current meds, labs,recent radiology, intake/output/weight and problem list reviewed

## 2019-06-17 NOTE — PROGRESS NOTES
6:12 PM  Observation notice provided in writing to patient and/or caregiver as well as verbal explanation of the policy. Patients who are in outpatient status also receive the Observation notice. Patient family declined to sign due to being notified of patient having a surgical procedure tomorrow by MD.   Copy provided to family. Unsigned copy placed in patient's chart.     CARLEY Gann/CRM  Care Management

## 2019-06-17 NOTE — PROGRESS NOTES
[address]  [address] [facility] [address]   [phone]                GI PROGRESS NOTE  [facility]  Work Cell: [phone]      NAME:   Adelaide Goldberg   :    1929   MRN:    [id number]     Assessment/Plan   1. Severe iron deficiency anemia - unclear etiology, r/o occult GI bleed including ulcer disease, AVMs, neoplasia, etc. Occult stool (-). Hgb stable. No overt bleeding.   - Supportive management per primary team  - Continue PPI  - Appreciate Hematology input  - Would defer GI work-up until cleared by cardiology - too high risk for endoscopy at present  - Monitor H&H, transfuse as needed     2. CAD  3. Aortic valve disease - w/ critical AS  - Cardiology following and discussing options   4. HTN  5. CKD    Will see PRN. Please call once cardiac clearance for endoscopic procedures obtained. Patient Active Problem List   Diagnosis Code    Cellulitis and abscess of leg, except foot L03.119, L02.419    Diabetes ([facility] E11.9    Essential hypertension, benign I10    Hip joint replacement     Morbidly obese ([facility] E66.01    Rhabdomyolysis M62.82    Severe aortic stenosis I35.0    Fall W19. Caleb Kilpatrick    CKD (chronic kidney disease) stage 3, GFR 30-59 ml/min ([facility]) N18.3    Anemia D64.9    Dehydration E86.0    Acute hypernatremia E87.0    Abnormal EKG R94.31       Subjective:     Denies any complaints. Hgb stable. Objective:     VITALS:   Last 24hrs VS reviewed since prior hospitalist progress note.  Most recent are:  Visit Vitals  /64 (BP 1 Location: Right arm, BP Patient Position: At rest)   Pulse 68   Temp 98.3 °F (36.8 °C)   Resp 16   Ht 5' 7\" (1.702 m)   Wt 73.5 kg (162 lb 0.6 oz)   SpO2 96%   BMI 25.38 kg/m²       Intake/Output Summary (Last 24 hours) at 2019 3717  Last data filed at 2019 0809  Gross per 24 hour   Intake    Output 875 ml   Net -875 ml        PHYSICAL EXAM:  General   well developed, elderly, alert, in no acute distress  EENT  Normocephalic, Atraumatic, PERRLA, EOMI, sclera clear  Respiratory   Clear To Auscultation bilaterally - no wheezes, rales, rhonchi, or crackles  Cardiology  Regular Rate and Rythmn  - (+) loud murmur, no rubs or gallops  Abdominal  Soft, non-tender, non-distended, positive bowel sounds, no hepatosplenomegaly, no palpable mass  Neurological  No focal neurological deficits noted  Psychological  Oriented x 3.  Normal affect. Lab Data   Recent Results (from the past 12 hour(s))   GLUCOSE, POC    Collection Time: 06/16/19  9:25 PM   Result Value Ref Range    Glucose (POC) 126 (H) 65 - 100 mg/dL    Performed by [employer] [place]    GLUCOSE, POC    Collection Time: 06/17/19  6:05 AM   Result Value Ref Range    Glucose (POC) 71 65 - 100 mg/dL    Performed by [employer] [place]    GLUCOSE, POC    Collection Time: 06/17/19  6:22 AM   Result Value Ref Range    Glucose (POC) 85 65 - 100 mg/dL    Performed by [employer]          Medications: Reviewed    PMH/ reviewed - no change compared to H&P  Mid-Level Provider: Jaimee Babcock NP   Date/Time:  6/17/2019  I have examined the patient. I have reviewed the chart and agree with the documentation recorded by the NP, including the assessment, treatment plan, and disposition.       Leela Boyce MD

## 2019-06-17 NOTE — PROGRESS NOTES
Sent email to [facility]AS[facility] oglesby and found , if they have seen his glasses.)    I received a reply, NO they had not found any glasses, but would keep looking out for them.

## 2019-06-18 LAB
ALBUMIN SERPL-MCNC: 2.4 G/DL (ref 3.5–5)
ALBUMIN/GLOB SERPL: 0.6 {RATIO} (ref 1.1–2.2)
ALP SERPL-CCNC: 83 U/L (ref 45–117)
ALT SERPL-CCNC: 14 U/L (ref 12–78)
ANION GAP SERPL CALC-SCNC: 5 MMOL/L (ref 5–15)
AST SERPL-CCNC: 16 U/L (ref 15–37)
BASOPHILS # BLD: 0 K/UL (ref 0–0.1)
BASOPHILS NFR BLD: 0 % (ref 0–1)
BILIRUB SERPL-MCNC: 0.2 MG/DL (ref 0.2–1)
BNP SERPL-MCNC: 5997 PG/ML
BUN SERPL-MCNC: 38 MG/DL (ref 6–20)
BUN/CREAT SERPL: 20 (ref 12–20)
CALCIUM SERPL-MCNC: 7.8 MG/DL (ref 8.5–10.1)
CHLORIDE SERPL-SCNC: 106 MMOL/L (ref 97–108)
CO2 SERPL-SCNC: 29 MMOL/L (ref 21–32)
CREAT SERPL-MCNC: 1.86 MG/DL (ref 0.7–1.3)
DIFFERENTIAL METHOD BLD: ABNORMAL
EOSINOPHIL # BLD: 0.3 K/UL (ref 0–0.4)
EOSINOPHIL NFR BLD: 3 % (ref 0–7)
ERYTHROCYTE [DISTWIDTH] IN BLOOD BY AUTOMATED COUNT: 18.9 % (ref 11.5–14.5)
GLOBULIN SER CALC-MCNC: 4.3 G/DL (ref 2–4)
GLUCOSE BLD STRIP.AUTO-MCNC: 106 MG/DL (ref 65–100)
GLUCOSE BLD STRIP.AUTO-MCNC: 112 MG/DL (ref 65–100)
GLUCOSE BLD STRIP.AUTO-MCNC: 159 MG/DL (ref 65–100)
GLUCOSE BLD STRIP.AUTO-MCNC: 164 MG/DL (ref 65–100)
GLUCOSE BLD STRIP.AUTO-MCNC: 71 MG/DL (ref 65–100)
GLUCOSE BLD STRIP.AUTO-MCNC: 71 MG/DL (ref 65–100)
GLUCOSE SERPL-MCNC: 89 MG/DL (ref 65–100)
HCT VFR BLD AUTO: 29.5 % (ref 36.6–50.3)
HGB BLD-MCNC: 8.8 G/DL (ref 12.1–17)
IMM GRANULOCYTES # BLD AUTO: 0.1 K/UL (ref 0–0.04)
IMM GRANULOCYTES NFR BLD AUTO: 1 % (ref 0–0.5)
LYMPHOCYTES # BLD: 1.6 K/UL (ref 0.8–3.5)
LYMPHOCYTES NFR BLD: 20 % (ref 12–49)
MAGNESIUM SERPL-MCNC: 1.9 MG/DL (ref 1.6–2.4)
MCH RBC QN AUTO: 27.7 PG (ref 26–34)
MCHC RBC AUTO-ENTMCNC: 29.8 G/DL (ref 30–36.5)
MCV RBC AUTO: 92.8 FL (ref 80–99)
MONOCYTES # BLD: 1 K/UL (ref 0–1)
MONOCYTES NFR BLD: 12 % (ref 5–13)
NEUTS SEG # BLD: 5.1 K/UL (ref 1.8–8)
NEUTS SEG NFR BLD: 64 % (ref 32–75)
NRBC # BLD: 0.03 K/UL (ref 0–0.01)
NRBC BLD-RTO: 0.4 PER 100 WBC
PLATELET # BLD AUTO: 294 K/UL (ref 150–400)
PMV BLD AUTO: 10 FL (ref 8.9–12.9)
POTASSIUM SERPL-SCNC: 4 MMOL/L (ref 3.5–5.1)
PROT SERPL-MCNC: 6.7 G/DL (ref 6.4–8.2)
RBC # BLD AUTO: 3.18 M/UL (ref 4.1–5.7)
SERVICE CMNT-IMP: ABNORMAL
SERVICE CMNT-IMP: NORMAL
SERVICE CMNT-IMP: NORMAL
SODIUM SERPL-SCNC: 140 MMOL/L (ref 136–145)
WBC # BLD AUTO: 8 K/UL (ref 4.1–11.1)

## 2019-06-18 PROCEDURE — 97530 THERAPEUTIC ACTIVITIES: CPT

## 2019-06-18 PROCEDURE — 83735 ASSAY OF MAGNESIUM: CPT

## 2019-06-18 PROCEDURE — 85025 COMPLETE CBC W/AUTO DIFF WBC: CPT

## 2019-06-18 PROCEDURE — 96376 TX/PRO/DX INJ SAME DRUG ADON: CPT

## 2019-06-18 PROCEDURE — 80053 COMPREHEN METABOLIC PANEL: CPT

## 2019-06-18 PROCEDURE — 99218 HC RM OBSERVATION: CPT

## 2019-06-18 PROCEDURE — 36415 COLL VENOUS BLD VENIPUNCTURE: CPT

## 2019-06-18 PROCEDURE — 74011250637 HC RX REV CODE- 250/637: Performed by: FAMILY MEDICINE

## 2019-06-18 PROCEDURE — 83880 ASSAY OF NATRIURETIC PEPTIDE: CPT

## 2019-06-18 PROCEDURE — 96374 THER/PROPH/DIAG INJ IV PUSH: CPT

## 2019-06-18 PROCEDURE — 97116 GAIT TRAINING THERAPY: CPT

## 2019-06-18 PROCEDURE — 74011000250 HC RX REV CODE- 250: Performed by: FAMILY MEDICINE

## 2019-06-18 PROCEDURE — 82962 GLUCOSE BLOOD TEST: CPT

## 2019-06-18 RX ORDER — DEXTROSE MONOHYDRATE AND SODIUM CHLORIDE 5; .9 G/100ML; G/100ML
75 INJECTION, SOLUTION INTRAVENOUS CONTINUOUS
Status: DISPENSED | OUTPATIENT
Start: 2019-06-18 | End: 2019-06-19

## 2019-06-18 RX ADMIN — POTASSIUM CHLORIDE 10 MEQ: 750 TABLET, FILM COATED, EXTENDED RELEASE ORAL at 18:00

## 2019-06-18 RX ADMIN — SIMVASTATIN 20 MG: 20 TABLET, FILM COATED ORAL at 22:45

## 2019-06-18 RX ADMIN — THERA TABS 1 TABLET: TAB at 18:00

## 2019-06-18 RX ADMIN — Medication 10 ML: at 18:00

## 2019-06-18 RX ADMIN — TIMOLOL MALEATE 1 DROP: 5 SOLUTION OPHTHALMIC at 11:25

## 2019-06-18 RX ADMIN — TIMOLOL MALEATE 1 DROP: 5 SOLUTION OPHTHALMIC at 18:03

## 2019-06-18 RX ADMIN — DEXTROSE MONOHYDRATE 25 G: 500 INJECTION PARENTERAL at 11:22

## 2019-06-18 RX ADMIN — Medication 10 ML: at 22:46

## 2019-06-18 RX ADMIN — TIMOLOL MALEATE 1 DROP: 5 SOLUTION OPHTHALMIC at 18:02

## 2019-06-18 RX ADMIN — Medication 10 ML: at 06:13

## 2019-06-18 RX ADMIN — DEXTROSE MONOHYDRATE 12.5 G: 500 INJECTION PARENTERAL at 06:34

## 2019-06-18 NOTE — PROGRESS NOTES
Hospitalist Progress Note    NAME: Edwige Zhang   :  1929   MRN:  [id number]       Assessment / Plan:    Subjective:  -No new complaints. I made him aware Dr Luci Mayfield has ponstpon the procedure due to elevated creatinine. Severe iron deficiency anemia,acute on chronic.    -Transfused 1 unit of packed red blood cells. Hb remained stable . -GI deferred w/u(EGD,colonoscopy) as patient has severe AS and GI would like cardiologist clearance. Severe AS  -Plan was for balloon aortic valvuloplasty on . Creatinine bumped and procedure was referred. KEIKO on stage III CKD  -Sudden bump in creatinine. Stopped lasix and antihypertensive. Start Iv fluid. BMP in AM  -Dr Luci Mayfield postponed aortic valvuloplasty       Acute hypernatremia. resolved. Dehydration. resolved. Type 2 diabetes mellitus. Place on Humalog insulin correction coverage schedule, Accu-Chek    Hypertension. BP low side,creatinien rising. Hold antihypertensives  . Hyperlipidemia. Continue on simvastatin. Chronicdiastolic CHF,NYHA class,I-II. LAsix held due to rising creatinine. Subjective:     Chief Complaint / Reason for Physician Visit  \"no complaints today   \". Discussed with RN events overnight. Review of Systems:  Symptom Y/N Comments  Symptom Y/N Comments   Fever/Chills    Chest Pain     Poor Appetite    Edema     Cough    Abdominal Pain     Sputum    Joint Pain     SOB/JOSUE    Pruritis/Rash     Nausea/vomit    Tolerating PT/OT     Diarrhea    Tolerating Diet     Constipation    Other       Could NOT obtain due to:      Objective:     VITALS:   Last 24hrs VS reviewed since prior progress note.  Most recent are:  Patient Vitals for the past 24 hrs:   Temp Pulse Resp BP SpO2   19 0819 98.3 °F (36.8 °C) 70 16 102/55 99 %   19 0220 97.5 °F (36.4 °C) 72 16 107/61 99 %   19 2039 97.4 °F (36.3 °C) 77 16 118/62 99 %   19 1403 97.8 °F (36.6 °C) 81 16 106/59 100 %       Intake/Output Summary (Last 24 hours) at 6/18/2019 1354  Last data filed at 6/18/2019 1249  Gross per 24 hour   Intake    Output 400 ml   Net -400 ml        PHYSICAL EXAM:  General: WD, WN. Alert, cooperative, no acute distress    EENT:  EOMI. Anicteric sclerae. MMM  Resp:  CTA bilaterally, no wheezing or rales. No accessory muscle use  CV:  Grade III systolic murmur A2 area. GI:  Soft, Non distended, Non tender.  +Bowel sounds  Neurologic:  Alert and oriented X 3, normal speech,   Psych:   Good insight. Not anxious nor agitated  Skin:  No rashes. No jaundice    Reviewed most current lab test results and cultures  YES  Reviewed most current radiology test results   YES  Review and summation of old records today    NO  Reviewed patient's current orders and MAR    YES  PMH/SH reviewed - no change compared to H&P  ________________________________________________________________________  Care Plan discussed with:    Comments   Patient     Family      RN     Care Manager     Consultant                        Multidiciplinary team rounds were held today with , nursing, pharmacist and clinical coordinator. Patient's plan of care was discussed; medications were reviewed and discharge planning was addressed. ________________________________________________________________________  Total NON critical care TIME:  35 Minutes    Total CRITICAL CARE TIME Spent:   Minutes non procedure based      Comments   >50% of visit spent in counseling and coordination of care     ________________________________________________________________________  Etienne Ray MD     Procedures: see electronic medical records for all procedures/Xrays and details which were not copied into this note but were reviewed prior to creation of Plan. LABS:  I reviewed today's most current labs and imaging studies.   Pertinent labs include:  Recent Labs     06/18/19 0139   WBC 8.0   HGB 8.8*   HCT 29.5*        Recent Labs     06/18/19 0139      K 4.0      CO2 29   GLU 89   BUN 38*   CREA 1.86*   CA 7.8*   MG 1.9   ALB 2.4*   TBILI 0.2   SGOT 16   ALT 14       Signed:  Salas Clarke MD

## 2019-06-18 NOTE — PROGRESS NOTES
6/18/2019     Admit Date: 6/11/2019    Admit Diagnosis: Anemia [D64.9]  Dehydration [E86.0]  Acute hypernatremia [E87.0]  Abnormal EKG [R94.31]  GIB (gastrointestinal bleeding) [K92.2]  Aortic stenosis [I35.0]    Principal Problem:    Anemia (2/7/2019)    Active Problems:    Dehydration (6/11/2019)      Acute hypernatremia (6/11/2019)      Abnormal EKG (6/11/2019)      Aortic stenosis (6/17/2019)      GIB (gastrointestinal bleeding) (6/17/2019)        Assessment/Plan:  Cath canceled d/t marked increase in his SCr  Agree with Dr Soy Ray. Can discharge the patient when his renal function is improving. I will see him in my office so we can figure out how to proceed. He will need a basic cardiac catheterization to reassess his coronary arteries and to confirm the degree of aortic stenosis we are seeing on echo. Discussed with Mr Antonio Ruiz at the bedside and with his granddaughter, Fabiana Cuellar, by phone     Subjective:  Up in his bedside chair and feels well today. No CP,       Alber D Antonio Ruiz denies chest pain, dyspnea, orthopnea, paroxysmal nocturnal dyspnea, dizziness. Objective:     Visit Vitals  BP 93/53 (BP 1 Location: Right arm, BP Patient Position: Sitting)   Pulse 72   Temp 96.4 °F (35.8 °C)   Resp 16   Ht 5' 7\" (1.702 m)   Wt 73.5 kg (162 lb 0.6 oz)   SpO2 98%   BMI 25.38 kg/m²        Physical Exam:  Neck: supple, symmetrical, trachea midline, no adenopathy, thyroid: not enlarged, symmetric, no tenderness/mass/nodules, no carotid bruit and no JVD  Heart: regular rate and rhythm, S1, S2 normal, systolic murmur: systolic ejection 3/6, musical at 2nd left intercostal space, at 2nd right intercostal space, at lower left sternal border, at apex  Lungs: clear to auscultation bilaterally, normal percussion bilaterally  Abdomen: soft, non-tender.  Bowel sounds normal. No masses,  no organomegaly  Extremities: extremities normal, atraumatic, no cyanosis or edema  Pulses: 2+ and symmetric  Neurologic: Grossly normal      Labs:    No results for input(s): CPK, CKMB, TROIQ in the last 72 hours. No lab exists for component: CKQMB, CPKMB  Recent Labs     06/18/19 0139      K 4.0      BUN 38*   CREA 1.86*   GLU 89   CA 7.8*     Recent Labs     06/18/19 0139   WBC 8.0   HGB 8.8*   HCT 29.5*        No results for input(s): CHOL, LDLC in the last 72 hours.     No lab exists for component: TGL, HDLC,  HBA1C    Telemetry: Not on tele     Data Review: current meds, labs,recent radiology, intake/output/weight and problem list reviewed

## 2019-06-18 NOTE — PROGRESS NOTES
NUTRITION COMPLETE ASSESSMENT    RECOMMENDATIONS:   1. Follow BG trends and encourage PO intake - especially at HS snack  2. Continue current diet  3. Weekly weights on standing scale     **may benefit from some assistance with meal preparation at home or Meals On Wheels if pt discharges back home when ready  Interventions/Plan:   Food/Nutrient Delivery:    Commercial supplement(Glucerna @ HS)          Assessment:   Reason for Assessment: [x]LOS /[x]At Nutrition Risk    Diet: Cardiac + snacks (NPO for procedure this morning)  Supplements: none  Nutritionally Significant Medications: [x] Reviewed & Includes: lasix, SSI, KCl, MVI, D5 NaCl @ 75ml/hr   Meal Intake:   Patient Vitals for the past 100 hrs:   % Diet Eaten   06/17/19 1247 100 %   06/17/19 1233 100 %   06/17/19 0933 100 %   06/15/19 1337 100 %   06/15/19 0745 100 %     Pre-Hospitalization:  Usual Appetite: Fair  Diet at Home: regular  Vitamins/Supplements: No    Current Hospitalization:   Appetite: Good  PO Ability: Independent Average po intake:%  Average supplements intake:        Subjective:  I eat good at home, I had been trying to eat a little healthier too. I usually do just breakfast and dinner. Objective:  Pt admitted for anemia. PMHx: DM, CHD, CKD, HLD, chronic pain, HTN. Anemia noted but with AS at high risk for endoscopy. Cardio following for severe AS -  plans for balloon aortic angioplasty today to allow for endoscopy at later date. Concerns for possible mal;ignancy with unintentional wt loss noted. Previous hematology workup negative. Issues with hypoglycemia (despite good intake) also noted with D5 started. DTC following. About 50# wt loss over past 2 years noted. Pt visited today and reports good intake but only 2 meals per day at home (see above). Eating very well here when meals provided.  Lives home along, question if may benefit from some assistance with meal preparation at home or Meals On Wheels if pt discharges back home when ready. Will add Glucerna 1x/day at night with low BG in the am - provides 220kcal, 10g protein. Standing scale this admit likely lower than office weight. Will follow wt trends, continue to use standing scale. Wt Readings from Last 10 Encounters:   06/18/19 73.5 kg (162 lb 0.6 oz)   05/21/19 79.2 kg (174 lb 8 oz)   02/07/19 80.8 kg (178 lb 3.2 oz)   12/21/15 98.9 kg (218 lb 0.6 oz)   05/02/14 96.8 kg (213 lb 6.5 oz)   02/02/11 119.1 kg (262 lb 8 oz)     Will continue to follow for PO intake, supplement acceptance, BG, wt trends. Estimated Nutrition Needs:   Kcals/day: 4195 Kcals/day(1620-1755kcal)  Protein: 81 g(1.1g/kg)  Fluid: 1850 ml(25ml/kcal)  Based On: [place] Jeor(x 1.2-1.3)  Weight Used: Actual wt(73.5kg)    Pt expected to meet estimated nutrient needs:  [x]   Yes     []  No [] Unable to predict at this time  Nutrition Diagnosis:   1. Unintended weight loss related to inadequate protein/energy intake as evidenced by 9% wt loss; eating 2 meals per day; healthier/lower espinoza food choices;     2.  Altered nutrition-related lab values related to ?inadequate CHO intake as evidenced by hypoglycemia     Goals:     Continued consumption of at least 75% meals and 1 supplement/day in 5-7 days; wt maintenance     Monitoring & Evaluation:    - Total energy intake, Liquid meal replacement, Protein intake, Carbohydrate intake   - Weight/weight change, Glucose profile, GI    Previous Nutrition Goals Met:   N/A  Previous Recommendations:    N/A    Education & Discharge Needs:   [x] None Identified   [] Identified and addressed    [x] Participated in care plan, discharge planning, and/or interdisciplinary rounds        Cultural, Alevism and ethnic food preferences identified: None    Skin Integrity: [x]Intact  []Other  Edema: []None [x]Other: 1-2+ BLLE  Last BM: 6/18  Food Allergies: [x]None []Other  Diet Restrictions: Cultural/Zoroastrian Preference(s): None     Anthropometrics:    Weight Loss Metrics 6/18/2019 5/21/2019 2/7/2019 12/21/2015 5/2/2014 2/2/2011   Today's Wt 162 lb 0.6 oz 174 lb 8 oz 178 lb 3.2 oz 218 lb 0.6 oz 213 lb 6.5 oz 262 lb 8 oz   BMI 25.38 kg/m2 27.33 kg/m2 27.91 kg/m2 34.14 kg/m2 33.42 kg/m2 41.10 kg/m2      Weight Source: Bed  Height: 5' 7\" (170.2 cm),    Body mass index is 25.38 kg/m².      IBW : 67.1 kg (148 lb), % IBW (Calculated): 109.49 %  Usual Body Weight: 80.7 kg (178 lb),      Labs:    Lab Results   Component Value Date/Time    Sodium 140 06/18/2019 01:39 AM    Potassium 4.0 06/18/2019 01:39 AM    Chloride 106 06/18/2019 01:39 AM    CO2 29 06/18/2019 01:39 AM    Glucose 89 06/18/2019 01:39 AM    BUN 38 (H) 06/18/2019 01:39 AM    Creatinine 1.86 (H) 06/18/2019 01:39 AM    Calcium 7.8 (L) 06/18/2019 01:39 AM    Magnesium 1.9 06/18/2019 01:39 AM    Phosphorus 2.1 (L) 12/20/2015 04:36 AM    Albumin 2.4 (L) 06/18/2019 01:39 AM     Lab Results   Component Value Date/Time    Hemoglobin A1c <3.5 (L) 06/12/2019 03:50 AM     Catrachita Schaefer, RD [address] , Pager #[phone] or [phone]

## 2019-06-18 NOTE — PROGRESS NOTES
Problem: Falls - Risk of  Goal: *Absence of Falls  Description  Document Tressia Bullion Fall Risk and appropriate interventions in the flowsheet. Outcome: Progressing Towards Goal     Problem: Patient Education: Go to Patient Education Activity  Goal: Patient/Family Education  Outcome: Progressing Towards Goal     Problem: Falls - Risk of  Goal: *Absence of Falls  Description  Document Tressia Bullion Fall Risk and appropriate interventions in the flowsheet.   Outcome: Progressing Towards Goal     Problem: Patient Education: Go to Patient Education Activity  Goal: Patient/Family Education  Outcome: Progressing Towards Goal

## 2019-06-18 NOTE — PROGRESS NOTES
Transition of Care Plan Note: SNF for rehab      Cm received denial from [facility], and sent out additional referrals to [address] [place] [address] and received denials from both stating patient is more appropriate for SNF level of rehab. Cm attempted to meet with patient-is sleeping soundly and did not awaken during conversation with family at bedside. Cm left SNF list for choice, however, will need to follow up with patient when he is awake and participate in conversation.      Alie Rothman, [facility]

## 2019-06-18 NOTE — PROGRESS NOTES
HYPOGLYCEMIC EPISODE DOCUMENTATION    Patient with hypoglycemic episode(s) at 0630(time) on 06/18/19(date). BG value(s) pre-treatment 70    Was patient symptomatic?  [] yes, [x] no  Patient was treated with the following rescue medications/treatments: [x] D50                [] Glucose tablets                [] Glucagon                [] 4oz juice                [] 6oz reg soda                [] 8oz low fat milk  BG value post-treatment: 106  Once BG treated and value greater than 80mg/dl, pt was provided with the following:  [] snack  [] meal  Name of MD notified:n/a  The following orders were received: n/a

## 2019-06-18 NOTE — PROGRESS NOTES
Problem: Mobility Impaired (Adult and Pediatric)  Goal: *Acute Goals and Plan of Care (Insert Text)  Description  Physical Therapy Goals  Initiated 6/15/2019  1. Patient will move from supine to sit and sit to supine , scoot up and down and roll side to side in bed with moderate assistance  within 7 day(s). 2.  Patient will transfer from bed to chair and chair to bed with moderate assistance  using the least restrictive device within 7 day(s). 3.  Patient will perform sit to stand with moderate assistance  within 7 day(s). 4.  Patient will ambulate with minimal assistance/contact guard assist for 150 feet with the least restrictive device within 7 day(s). Outcome: Progressing Towards Goal  PHYSICAL THERAPY TREATMENT  Patient: Ruddy Bains (37 y.o. male)  Date: 6/18/2019  Diagnosis: Anemia [D64.9]  Dehydration [E86.0]  Acute hypernatremia [E87.0]  Abnormal EKG [R94.31]  GIB (gastrointestinal bleeding) [K92.2]  Aortic stenosis [I35.0] Anemia  Procedure(s) (LRB):  ESOPHAGOGASTRODUODENOSCOPY (EGD) (N/A)  COLONOSCOPY (N/A) 5 Days Post-Op  Precautions: Fall  Chart, physical therapy assessment, plan of care and goals were reviewed. ASSESSMENT:  Based on the objective data described below, the patient presents with Maximum assistance and Assist x2 level overall for transfers - secondary to patient unable to flex at hips in sitting to lean forward to come to standing. Gait training completed at Contact guard assistance, 190 feet and using a gait belt and rollator. The following are barriers to independence while in acute care:   -Cognitive and/or behavioral:     -Medical condition: ROM, strength, functional endurance, sitting balance and functional mobility     -Other:       Prior level of function: Living indep, used lift chair, rollator      PLAN:  Patient continues to benefit from skilled intervention to address the above impairments. Continue treatment per established plan of care.   Recommend with staff: 2 person assist for OOB  Recommend next PT session: Continue hip/trunk ROM stretching  Discharge recommendations: Rehab at inpatient facility: patient can tolerate 3 hours of therapy (to regain functional baseline patient requires rehab)  If above is not an option then recommend: Rehab at skilled nursing facility (SNF) (to regain functional baseline patient requires rehab)    Patient's barriers to discharging home, in addition to above impairments: lives alone  family availability to assist  level of physical assist required to maintain patient safety. Equipment recommendations for successful discharge (if) home: none anticipated        SUBJECTIVE:   Patient stated ? being in the bed for a week - has made me so stiff. ?    OBJECTIVE DATA SUMMARY:   Critical Behavior:  Neurologic State: Alert  Orientation Level: Oriented X4  Cognition: Follows commands  Safety/Judgement: Awareness of environment  Functional Mobility Training:  Bed Mobility:     Supine to Sit: Moderate assistance              Transfers:  Sit to Stand: Moderate assistance;Assist x2  Stand to Sit: Minimum assistance; Additional time;Assist x1        Bed to Chair: Maximum assistance;Assist x2                    Balance:  Sitting: Impaired  Sitting - Static: Fair (occasional)  Sitting - Dynamic: Fair (occasional)  Standing: Impaired; With support  Standing - Static: Good;Constant support  Standing - Dynamic : Fair;Constant support  Ambulation/Gait Training:  Distance (ft): 190 Feet (ft)  Assistive Device: Gait belt;Walker, rollator  Ambulation - Level of Assistance: Contact guard assistance        Gait Abnormalities: Decreased step clearance        Base of Support: Narrowed     Speed/Seema: Shuffled; Slow  Step Length: Right shortened;Left shortened             Therapeutic Exercises:   Prior to attempting OOB - AROM, PROM and Contract/relax - stretching to bilateral hips/knees and lower back. Pt tolerated 10 mins of stretching.     Pain:  Pt denied pain - only reports stiffness. Activity Tolerance:   Fair  Please refer to the flowsheet for vital signs taken during this treatment.     After treatment patient left:   Up in chair  Call light within reach  RN notified  Family at bedside     COMMUNICATION/COLLABORATION:   The patient?s plan of care was discussed with: Registered Nurse    Clive Soriano, PT   Time Calculation: 30 mins

## 2019-06-19 PROBLEM — D64.9 SEVERE ANEMIA: Status: ACTIVE | Noted: 2019-06-19

## 2019-06-19 PROBLEM — N17.9 AKI (ACUTE KIDNEY INJURY) (HCC): Status: ACTIVE | Noted: 2019-06-19

## 2019-06-19 LAB
ANION GAP SERPL CALC-SCNC: 5 MMOL/L (ref 5–15)
BUN SERPL-MCNC: 32 MG/DL (ref 6–20)
BUN/CREAT SERPL: 23 (ref 12–20)
CALCIUM SERPL-MCNC: 7.9 MG/DL (ref 8.5–10.1)
CHLORIDE SERPL-SCNC: 111 MMOL/L (ref 97–108)
CO2 SERPL-SCNC: 28 MMOL/L (ref 21–32)
CREAT SERPL-MCNC: 1.39 MG/DL (ref 0.7–1.3)
GLUCOSE BLD STRIP.AUTO-MCNC: 131 MG/DL (ref 65–100)
GLUCOSE BLD STRIP.AUTO-MCNC: 140 MG/DL (ref 65–100)
GLUCOSE BLD STRIP.AUTO-MCNC: 162 MG/DL (ref 65–100)
GLUCOSE BLD STRIP.AUTO-MCNC: 183 MG/DL (ref 65–100)
GLUCOSE BLD STRIP.AUTO-MCNC: 73 MG/DL (ref 65–100)
GLUCOSE BLD STRIP.AUTO-MCNC: 73 MG/DL (ref 65–100)
GLUCOSE SERPL-MCNC: 79 MG/DL (ref 65–100)
POTASSIUM SERPL-SCNC: 3.8 MMOL/L (ref 3.5–5.1)
SERVICE CMNT-IMP: ABNORMAL
SERVICE CMNT-IMP: NORMAL
SERVICE CMNT-IMP: NORMAL
SODIUM SERPL-SCNC: 144 MMOL/L (ref 136–145)

## 2019-06-19 PROCEDURE — 97535 SELF CARE MNGMENT TRAINING: CPT

## 2019-06-19 PROCEDURE — 74011000258 HC RX REV CODE- 258: Performed by: HOSPITALIST

## 2019-06-19 PROCEDURE — 74011000250 HC RX REV CODE- 250: Performed by: FAMILY MEDICINE

## 2019-06-19 PROCEDURE — 99218 HC RM OBSERVATION: CPT

## 2019-06-19 PROCEDURE — 80048 BASIC METABOLIC PNL TOTAL CA: CPT

## 2019-06-19 PROCEDURE — 96361 HYDRATE IV INFUSION ADD-ON: CPT

## 2019-06-19 PROCEDURE — 36415 COLL VENOUS BLD VENIPUNCTURE: CPT

## 2019-06-19 PROCEDURE — 96376 TX/PRO/DX INJ SAME DRUG ADON: CPT

## 2019-06-19 PROCEDURE — 97116 GAIT TRAINING THERAPY: CPT

## 2019-06-19 PROCEDURE — 65270000029 HC RM PRIVATE

## 2019-06-19 PROCEDURE — 74011250637 HC RX REV CODE- 250/637: Performed by: FAMILY MEDICINE

## 2019-06-19 PROCEDURE — 94760 N-INVAS EAR/PLS OXIMETRY 1: CPT

## 2019-06-19 PROCEDURE — 74011250637 HC RX REV CODE- 250/637: Performed by: HOSPITALIST

## 2019-06-19 PROCEDURE — 82962 GLUCOSE BLOOD TEST: CPT

## 2019-06-19 PROCEDURE — 97530 THERAPEUTIC ACTIVITIES: CPT

## 2019-06-19 RX ORDER — POLYETHYLENE GLYCOL 3350 17 G/17G
17 POWDER, FOR SOLUTION ORAL DAILY
Status: DISCONTINUED | OUTPATIENT
Start: 2019-06-19 | End: 2019-06-22 | Stop reason: HOSPADM

## 2019-06-19 RX ORDER — AMOXICILLIN 250 MG
1 CAPSULE ORAL
Status: DISCONTINUED | OUTPATIENT
Start: 2019-06-19 | End: 2019-06-22 | Stop reason: HOSPADM

## 2019-06-19 RX ORDER — DEXTROSE MONOHYDRATE AND SODIUM CHLORIDE 5; .9 G/100ML; G/100ML
75 INJECTION, SOLUTION INTRAVENOUS CONTINUOUS
Status: DISPENSED | OUTPATIENT
Start: 2019-06-19 | End: 2019-06-20

## 2019-06-19 RX ADMIN — POLYETHYLENE GLYCOL 3350 17 G: 17 POWDER, FOR SOLUTION ORAL at 12:10

## 2019-06-19 RX ADMIN — Medication 10 ML: at 06:29

## 2019-06-19 RX ADMIN — TIMOLOL MALEATE 1 DROP: 5 SOLUTION OPHTHALMIC at 09:36

## 2019-06-19 RX ADMIN — DEXTROSE MONOHYDRATE AND SODIUM CHLORIDE 75 ML/HR: 5; .9 INJECTION, SOLUTION INTRAVENOUS at 09:33

## 2019-06-19 RX ADMIN — SIMVASTATIN 20 MG: 20 TABLET, FILM COATED ORAL at 21:58

## 2019-06-19 RX ADMIN — TIMOLOL MALEATE 1 DROP: 5 SOLUTION OPHTHALMIC at 19:39

## 2019-06-19 RX ADMIN — Medication: at 09:35

## 2019-06-19 RX ADMIN — Medication 10 ML: at 21:58

## 2019-06-19 RX ADMIN — Medication: at 09:34

## 2019-06-19 RX ADMIN — POTASSIUM CHLORIDE 10 MEQ: 750 TABLET, FILM COATED, EXTENDED RELEASE ORAL at 09:33

## 2019-06-19 RX ADMIN — DEXTROSE MONOHYDRATE 12.5 G: 500 INJECTION PARENTERAL at 06:58

## 2019-06-19 RX ADMIN — THERA TABS 1 TABLET: TAB at 09:00

## 2019-06-19 RX ADMIN — Medication 10 ML: at 19:39

## 2019-06-19 NOTE — PROGRESS NOTES
Problem: Mobility Impaired (Adult and Pediatric)  Goal: *Acute Goals and Plan of Care (Insert Text)  Description  Physical Therapy Goals  Initiated 6/15/2019  1. Patient will move from supine to sit and sit to supine , scoot up and down and roll side to side in bed with moderate assistance  within 7 day(s). 2.  Patient will transfer from bed to chair and chair to bed with moderate assistance  using the least restrictive device within 7 day(s). 3.  Patient will perform sit to stand with moderate assistance  within 7 day(s). 4.  Patient will ambulate with minimal assistance/contact guard assist for 150 feet with the least restrictive device within 7 day(s). Outcome: Progressing Towards Goal   PHYSICAL THERAPY TREATMENT  Patient: Oumou Crawley (62 y.o. male)  Date: 6/19/2019  Diagnosis: Anemia [D64.9]  Dehydration [E86.0]  Acute hypernatremia [E87.0]  Abnormal EKG [R94.31]  GIB (gastrointestinal bleeding) [K92.2]  Aortic stenosis [I35.0]  Severe anemia [D64.9]  KEIKO (acute kidney injury) ([facility] [address] [N17.9] Anemia  Procedure(s) (LRB):  ESOPHAGOGASTRODUODENOSCOPY (EGD) (N/A)  COLONOSCOPY (N/A) 6 Days Post-Op  Precautions: Fall  Chart, physical therapy assessment, plan of care and goals were reviewed. ASSESSMENT:  Based on the objective data described below, the patient presents with Moderate Assistance x 2 level overall for transfers - sit to/from stand. Gait training completed at Contact guard assistance, 190 feet and using a rollator. Pt continues to be very stiff - difficulty with trunk/knee flexion. The following are barriers to independence while in acute care:   -Cognitive and/or behavioral:     -Medical condition: ROM, strength, functional endurance and standing balance    -Other:       Prior level of function: Living independent, used lift chair, per pt electric chair/bed ? PLAN:  Patient continues to benefit from skilled intervention to address the above impairments.   Continue treatment per established plan of care. Recommend with staff: 2 person assist for transfers  Discharge recommendations: Rehab at skilled nursing facility (SNF) (to regain functional baseline patient requires rehab)    Equipment recommendations for successful discharge (if) home: n/a        SUBJECTIVE:   Patient stated ? I have a electric bed to chair at home. ?    OBJECTIVE DATA SUMMARY:   Critical Behavior:  Neurologic State: (P) Alert  Orientation Level: (P) Oriented X4  Cognition: (P) Appropriate for age attention/concentration  Safety/Judgement: Awareness of environment  Functional Mobility Training:  Bed Mobility:     Supine to Sit: Moderate assistance;Assist x1              Transfers:  Sit to Stand: Moderate assistance;Assist x2  Stand to Sit: Minimum assistance;Assist x1        Bed to Chair: Moderate assistance;Assist x2                    Balance:  Sitting: Impaired  Sitting - Static: Fair (occasional)  Sitting - Dynamic: Fair (occasional)  Standing: Impaired; With support  Standing - Static: Good;Constant support  Standing - Dynamic : Fair;Constant support  Ambulation/Gait Training:  Distance (ft): 190 Feet (ft)  Assistive Device: Gait belt;Walker, rollator  Ambulation - Level of Assistance: Contact guard assistance        Gait Abnormalities: Decreased step clearance(decreased stride)        Base of Support: Narrowed     Speed/Seema: Shuffled; Slow  Step Length: Right shortened;Left shortened           Therapeutic Exercises:   AROM, AAROM and trunk ROM in supine prior to coming to sitting edge of bed. Pain:  Pt denies pain. Activity Tolerance:   Good - pt prefers to be OOB; enjoys amb in hallway. Please refer to the flowsheet for vital signs taken during this treatment.     After treatment patient left:   Up on [facility]  Nurse and Occupational THerapy at bedside       COMMUNICATION/COLLABORATION:   The patient?s plan of care was discussed with: Occupational Therapist and Registered Nurse    Lucretia Tadeo, PT   Time Calculation: 15 mins

## 2019-06-19 NOTE — PROGRESS NOTES
Problem: Falls - Risk of  Goal: *Absence of Falls  Description  Document Ursula ventura Fall Risk and appropriate interventions in the flowsheet. Outcome: Progressing Towards Goal  Note:   Fall Risk Interventions:  Mobility Interventions: Bed/chair exit alarm, Communicate number of staff needed for ambulation/transfer    Mentation Interventions: Bed/chair exit alarm, Evaluate medications/consider consulting pharmacy    Medication Interventions: Bed/chair exit alarm    Elimination Interventions: Bed/chair exit alarm              Problem: Pressure Injury - Risk of  Goal: *Prevention of pressure injury  Description  Document Eitan Scale and appropriate interventions in the flowsheet. Outcome: Progressing Towards Goal  Note:   Pressure Injury Interventions:  Sensory Interventions: Discuss PT/OT consult with provider, Maintain/enhance activity level    Moisture Interventions: Absorbent underpads, Maintain skin hydration (lotion/cream)    Activity Interventions: Pressure redistribution bed/mattress(bed type), Increase time out of bed    Mobility Interventions: HOB 30 degrees or less, Float heels    Nutrition Interventions: Document food/fluid/supplement intake                     Problem: Patient Education: Go to Patient Education Activity  Goal: Patient/Family Education  Outcome: Progressing Towards Goal  Note:   Pt up with PT/OT and requires two person assist to stand. Able to ambulate using rollator for short distance.

## 2019-06-19 NOTE — PROGRESS NOTES
Hospitalist Progress Note    NAME: Gaston Barrera   :  1929   MRN:  [id number]       Assessment / Plan:    Subjective:  -No new complaints. KEIKO on stage III CKD  -Sudden bump in creatinine. Stopped lasix and antihypertensive. Start Iv fluid.  -Creatinine improved. Contune iv fluids. BMP in AM      Severe iron deficiency anemia,acute on chronic.    -Transfused 1 unit of packed red blood cells. Hb remained stable . -GI deferred w/u(EGD,colonoscopy) as patient has severe AS and GI would like cardiologist clearance. Severe AS  -Plan was for balloon aortic valvuloplasty on . Creatinine bumped and procedure was referred. Acute hypernatremia. resolved. Dehydration. resolved. Type 2 diabetes mellitus. Place on Humalog insulin correction coverage schedule, Accu-Chek    Hypertension. BP low side,creatinien rising. Hold antihypertensives  . Hyperlipidemia. Continue on simvastatin. Chronicdiastolic CHF,NYHA class,I-II. LAsix held due to rising creatinine. Subjective:     Chief Complaint / Reason for Physician Visit  \"no complaints today   \". Discussed with RN events overnight. Review of Systems:  Symptom Y/N Comments  Symptom Y/N Comments   Fever/Chills    Chest Pain     Poor Appetite    Edema     Cough    Abdominal Pain     Sputum    Joint Pain     SOB/JOSUE    Pruritis/Rash     Nausea/vomit    Tolerating PT/OT     Diarrhea    Tolerating Diet     Constipation    Other       Could NOT obtain due to:      Objective:     VITALS:   Last 24hrs VS reviewed since prior progress note.  Most recent are:  Patient Vitals for the past 24 hrs:   Temp Pulse Resp BP SpO2   19 0807 97.8 °F (36.6 °C) 63 20 117/66 99 %   19 0412 97.4 °F (36.3 °C) 67 20 112/58 95 %   19 2035 97.6 °F (36.4 °C) 65 22 124/57 98 %   19 1507 96.4 °F (35.8 °C) 72 16 93/53 98 %       Intake/Output Summary (Last 24 hours) at 2019 1342  Last data filed at 2019 1307  Gross per 24 hour Intake 1040 ml   Output 950 ml   Net 90 ml        PHYSICAL EXAM:  General: WD, WN. Alert, cooperative, no acute distress    EENT:  EOMI. Anicteric sclerae. MMM  Resp:  CTA bilaterally, no wheezing or rales. No accessory muscle use  CV:  Grade III systolic murmur A2 area. GI:  Soft, Non distended, Non tender.  +Bowel sounds  Neurologic:  Alert and oriented X 3, normal speech,   Psych:   Good insight. Not anxious nor agitated  Skin:  No rashes. No jaundice    Reviewed most current lab test results and cultures  YES  Reviewed most current radiology test results   YES  Review and summation of old records today    NO  Reviewed patient's current orders and MAR    YES  PMH/SH reviewed - no change compared to H&P  ________________________________________________________________________  Care Plan discussed with:    Comments   Patient     Family      RN     Care Manager     Consultant                        Multidiciplinary team rounds were held today with , nursing, pharmacist and clinical coordinator. Patient's plan of care was discussed; medications were reviewed and discharge planning was addressed. ________________________________________________________________________  Total NON critical care TIME:  35 Minutes    Total CRITICAL CARE TIME Spent:   Minutes non procedure based      Comments   >50% of visit spent in counseling and coordination of care     ________________________________________________________________________  Tatum Guillermo MD     Procedures: see electronic medical records for all procedures/Xrays and details which were not copied into this note but were reviewed prior to creation of Plan. LABS:  I reviewed today's most current labs and imaging studies.   Pertinent labs include:  Recent Labs     06/18/19 0139   WBC 8.0   HGB 8.8*   HCT 29.5*        Recent Labs     06/19/19 0443 06/18/19 0139    140   K 3.8 4.0   * 106   CO2 28 29   GLU 79 89   BUN 32* 38*   CREA 1.39* 1.86*   CA 7.9* 7.8*   MG  --  1.9   ALB  --  2.4*   TBILI  --  0.2   SGOT  --  16   ALT  --  14       Signed:  Delonte Oviedo MD

## 2019-06-19 NOTE — DIABETES MGMT
[facility]    [facility] Progress Note    Recommendations/ Comments: Chart reviewed due to hypoglycemia. Pt has correction scale with high sensitivity ordered but has not required any. Noted po intake %. Noted D5 added this morning. DTC to continue to follow. Current hospital DM medication: correction scale Humalog with high sensitivity     Chart reviewed on [facility] Wallace. Patient is a 80 y.o. male with known diabetes on no diabetes medications at home. A1c:   Lab Results   Component Value Date/Time    Hemoglobin A1c <3.5 (L) 06/12/2019 03:50 AM       Recent Glucose Results:   Lab Results   Component Value Date/Time    GLU 79 06/19/2019 04:43 AM    GLUCPOC 162 (H) 06/19/2019 07:17 AM    GLUCPOC 73 06/19/2019 06:53 AM    GLUCPOC 73 06/19/2019 06:37 AM        Lab Results   Component Value Date/Time    Creatinine 1.39 (H) 06/19/2019 04:43 AM     Estimated Creatinine Clearance: 33.7 mL/min (A) (based on SCr of 1.39 mg/dL (H)). Active Orders   Diet    DIET CARDIAC Regular; 2 GM NA (House Low NA)        PO intake:   Patient Vitals for the past 72 hrs:   % Diet Eaten   06/19/19 0856 100 %   06/18/19 1800 95 %   06/18/19 1400 100 %   06/17/19 1247 100 %   06/17/19 1233 100 %   06/17/19 0933 100 %       Will continue to follow as needed.     Thank you    Jodie Pablo RD, CDE        Time spent: 5 minutes

## 2019-06-19 NOTE — PROGRESS NOTES
Discussed dc with physician, patient will be medically ready for dc tomorrow. Cm contacted Neva in Admissions at [facility] to request decision and if accepted to initiate auth.  Will follow up    Hattie Payan, [facility]

## 2019-06-19 NOTE — PROGRESS NOTES
Problem: Self Care Deficits Care Plan (Adult)  Goal: *Acute Goals and Plan of Care (Insert Text)  Description  Occupational Therapy Goals  Initiated 6/15/2019  1. Patient will perform upper body dressing with supervision/set-up within 7 day(s). 2.  Patient will perform lower body dressing with minimal assistance/contact guard assist within 7 day(s). 3.  Patient will perform seated bathing with minimal assistance/contact guard assist within 7 day(s). MET with set up for upper body 6/17/19.  4.  Patient will perform toilet transfers with supervision/set-up within 7 day(s). 5.  Patient will perform all aspects of toileting with minimal assistance/contact guard assist within 7 day(s). Outcome: Progressing Towards Goal     OCCUPATIONAL THERAPY TREATMENT  Patient: Marcelina Hills (1 y.o. male)  Date: 6/19/2019  Diagnosis: Anemia [D64.9]  Dehydration [E86.0]  Acute hypernatremia [E87.0]  Abnormal EKG [R94.31]  GIB (gastrointestinal bleeding) [K92.2]  Aortic stenosis [I35.0]  Severe anemia [D64.9]  KEIKO (acute kidney injury) ([facility] [address] [N17.9] Anemia  Procedure(s) (LRB):  ESOPHAGOGASTRODUODENOSCOPY (EGD) (N/A)  COLONOSCOPY (N/A) 6 Days Post-Op  Precautions: Fall  Chart, occupational therapy assessment, plan of care, and goals were reviewed. ASSESSMENT:  Patient is received in bed agreeable to participate. Observed improvement with sit <--> stand as compared to previous notes. Patient requires mod A x2 for sit to stand with physical assist for hand transfer from bed to rollator. During mobility, patient observed to keep shoulders tense and elevated, therefore performed focused shoulder elevation exercises with focus on full relaxation prior to next rep of exercises. Required use of visual/verbal cues when performing mobility due to R visual impairment. Performed toilet transfer with mod A x2.  Patient able to manage clothing with min A, however once they fell beyond his knees, requires total assist to bring up towards knees due to severe difficulty with hip and trunk flexion. Patient reporting he has familiarity with reacher, therefore plan to introduce next session. Changed gown with min A and set up in chair with all needs met. RN present and aware of session outcome, agreeable to assist back to bed after an hour due to LE needs. Continue to recommend IPR at discharge. Progression toward goals:  ?       Improving appropriately and progressing toward goals  ? Improving slowly and progressing toward goals  ? Not making progress toward goals and plan of care will be adjusted     PLAN:  Patient continues to benefit from skilled intervention to address the above impairments. Continue treatment per established plan of care. Discharge Recommendations:  Inpatient Rehab  Further Equipment Recommendations for Discharge:  TBD at rehab      SUBJECTIVE:   Patient stated ? I don't feel like I have to go [facility] ], but I can try. ? RN cleared patient for therapy. Patient agreeable to participate. OBJECTIVE DATA SUMMARY:   Cognitive/Behavioral Status:  Neurologic State: Alert  Orientation Level: Oriented X4  Cognition: Appropriate for age attention/concentration; Follows commands  Perception: Tactile;Visual;Verbal;Cues to attend right visual field(due to patient is blind in R eye)  Perseveration: No perseveration noted  Safety/Judgement: Decreased awareness of environment(due to visual impairment)    Functional Mobility and Transfers for ADLs:  Bed Mobility:  Supine to Sit: Moderate assistance;Assist x1(for LE and trunk management)  Sit to Supine: Moderate assistance;Assist x2    Transfers:  Sit to Stand: Moderate assistance;Assist x2  Functional Transfers  Toilet Transfer : Moderate assistance;Assist x2  Bed to Chair: Moderate assistance;Assist x2    Balance:  Sitting: Impaired  Sitting - Static: Fair (occasional)  Sitting - Dynamic: Fair (occasional)  Standing: Impaired; With support  Standing - Static: Good;Constant support  Standing - Dynamic : Fair;Constant support    ADL Intervention:  Upper Body Dressing Assistance  Dressing Assistance: (seated)  Hospital Gown: Minimum  assistance    Lower Body Dressing Assistance  Protective Undergarmet: Maximum assistance(patient able to assist with pulling up over hips; total to don at feet due to severely limited flexion at hips and trunk)  Leg Crossed Method Used: No  Position Performed: Bending forward method;Seated in chair  Cues: Physical assistance; Tactile cues provided    Toileting  Bladder Hygiene: Supervision  Bowel Hygiene: Maximum assistance  Clothing Management: Minimum assistance  Cues: Verbal cues provided; Tactile cues provided  Adaptive Equipment: Other (comment)(BSC)    Cognitive Retraining  Safety/Judgement: Decreased awareness of environment(due to visual impairment)    Therapeutic Exercises:   Patient instructed to perform focused scapular elevation exercises in standing due to observed tension in neck and shoulders. Focused on full relaxation between each rep to relieve tension in shoulders and neck and improve neck ROM in order to visually scan environment during mobility. Pain:  Patient denied pain this session. Activity Tolerance:   Good. Denied feeling lightheaded or dizzy during session. After treatment:   ? Patient left in no apparent distress sitting up in chair  ? Patient left in no apparent distress in bed  ? Call bell left within reach  ? Nursing notified  ? Caregiver present  ?  Bed alarm activated    COMMUNICATION/COLLABORATION:   The patient?s plan of care was discussed with: Physical Therapist and Registered Nurse    Huy Sanchez OT  Time Calculation: 51 mins

## 2019-06-19 NOTE — PROGRESS NOTES
Problem: Falls - Risk of  Goal: *Absence of Falls  Description  Document Reginia Aye Fall Risk and appropriate interventions in the flowsheet. Outcome: Progressing Towards Goal     Problem: Patient Education: Go to Patient Education Activity  Goal: Patient/Family Education  Outcome: Progressing Towards Goal     Problem: Falls - Risk of  Goal: *Absence of Falls  Description  Document Reginia Aye Fall Risk and appropriate interventions in the flowsheet.   Outcome: Progressing Towards Goal     Problem: Patient Education: Go to Patient Education Activity  Goal: Patient/Family Education  Outcome: Progressing Towards Goal

## 2019-06-19 NOTE — PROGRESS NOTES
Bedside shift change report given to Angie George (oncoming nurse) by Ja Ramos (offgoing nurse). Report included the following information SBAR, Kardex, MAR and Recent Results.

## 2019-06-19 NOTE — PHYSICIAN ADVISORY
Letter of Status Determination:  
Recommend hospitalization status upgraded from OBSERVATION  to INPATIENT  Status Pt Name:  Josefina Dent MR#  
TANISHA # [id number] / 
[id number] Payor: Nicolasa Arora / [facility]: [facility] [employer] [facility]IC[facility] COMPLETE / Product Type: [employer] Medicare /   
[facility]#  [id number] Room and Hospital  207/01  @ [facility]  
Hospitalization date  6/11/2019  5:42 PM  
Current Attending Physician  Larayne Babinski, MD  
Principal diagnosis  Anemia Clinicals An 19-year-old -American male with past medical history of type 2 diabetes mellitus, CHF, hypertension, chronic pain, cataract presented to Emergency Department from home with no chief complaint. The patient unfortunately is a poor historian. Majority of the history was obtained from review of ED and electronic medical records. Per their collective reports, the patient notably has been referred from his gastroenterologist - Dr. Heena Godoy office. The patient notably was anemic with hemoglobin of 6.1 on 06/05/2019. On arrival to the emergency department, initially recorded vital signs; blood pressure 159/60, heart rate 61, respirations 16, O2 saturation 98% on room air. The workup included hemoglobin at 6.6. ED then consulted hospitalist for further evaluation and treatment. In the interim, blood transfusion had already been started. The patient otherwise has no complaints. Pt had a sustained rise in serum creatinine, was supposed to undergo balloon aortic valvuloplasty  but that was deferred, Pt s/p pRBC x1 unit, Pt with severe AS high risk for EGD, was also dehydrated and hypernatremic Milliman (MCG) criteria STATUS DETERMINATION  Inpatient The final decision of the patient's hospitalization status depends on the attending physician's judgment Additional comments Payor: Nicolasa Arora / Plan: [employer] MEDICARE COMPLETE / Product Type: [employer] Medicare /   
  
 Bhupendra High MD 
Cell: [phone] Physician Advisor

## 2019-06-19 NOTE — PROGRESS NOTES
HYPOGLYCEMIC EPISODE DOCUMENTATION    Patient with hypoglycemic episode(s) at 0650(time) on 06/19/19(date). BG value(s) pre-treatment 68    Was patient symptomatic?  [] yes, [x] no  Patient was treated with the following rescue medications/treatments: [x] D50                [] Glucose tablets                [] Glucagon                [] 4oz juice                [] 6oz reg soda                [] 8oz low fat milk  BG value post-treatment: 160  Once BG treated and value greater than 80mg/dl, pt was provided with the following:  [] snack  [] meal  Name of MD notified:n/a  The following orders were received: n/a

## 2019-06-19 NOTE — PROGRESS NOTES
Transition of Care Plan: SNF    Cm talked with patient and granddaughter and they have chosen [facility] for [facility], referral placed, will await acceptance. Will require prior auth from insurance and will have [facility] start that if they accept.     Emerald Chilel, [facility]

## 2019-06-20 LAB
ANION GAP SERPL CALC-SCNC: 4 MMOL/L (ref 5–15)
BASOPHILS # BLD: 0 K/UL (ref 0–0.1)
BASOPHILS NFR BLD: 1 % (ref 0–1)
BUN SERPL-MCNC: 33 MG/DL (ref 6–20)
BUN/CREAT SERPL: 20 (ref 12–20)
CALCIUM SERPL-MCNC: 7.5 MG/DL (ref 8.5–10.1)
CHLORIDE SERPL-SCNC: 112 MMOL/L (ref 97–108)
CO2 SERPL-SCNC: 27 MMOL/L (ref 21–32)
CREAT SERPL-MCNC: 1.69 MG/DL (ref 0.7–1.3)
DIFFERENTIAL METHOD BLD: ABNORMAL
EOSINOPHIL # BLD: 0.4 K/UL (ref 0–0.4)
EOSINOPHIL NFR BLD: 5 % (ref 0–7)
ERYTHROCYTE [DISTWIDTH] IN BLOOD BY AUTOMATED COUNT: 19.2 % (ref 11.5–14.5)
GLUCOSE BLD STRIP.AUTO-MCNC: 100 MG/DL (ref 65–100)
GLUCOSE BLD STRIP.AUTO-MCNC: 109 MG/DL (ref 65–100)
GLUCOSE BLD STRIP.AUTO-MCNC: 74 MG/DL (ref 65–100)
GLUCOSE BLD STRIP.AUTO-MCNC: 77 MG/DL (ref 65–100)
GLUCOSE BLD STRIP.AUTO-MCNC: 81 MG/DL (ref 65–100)
GLUCOSE BLD STRIP.AUTO-MCNC: 95 MG/DL (ref 65–100)
GLUCOSE SERPL-MCNC: 99 MG/DL (ref 65–100)
HCT VFR BLD AUTO: 25.9 % (ref 36.6–50.3)
HGB BLD-MCNC: 7.6 G/DL (ref 12.1–17)
IMM GRANULOCYTES # BLD AUTO: 0.1 K/UL (ref 0–0.04)
IMM GRANULOCYTES NFR BLD AUTO: 1 % (ref 0–0.5)
LYMPHOCYTES # BLD: 1.3 K/UL (ref 0.8–3.5)
LYMPHOCYTES NFR BLD: 17 % (ref 12–49)
MCH RBC QN AUTO: 27.5 PG (ref 26–34)
MCHC RBC AUTO-ENTMCNC: 29.3 G/DL (ref 30–36.5)
MCV RBC AUTO: 93.8 FL (ref 80–99)
MONOCYTES # BLD: 0.9 K/UL (ref 0–1)
MONOCYTES NFR BLD: 12 % (ref 5–13)
NEUTS SEG # BLD: 4.9 K/UL (ref 1.8–8)
NEUTS SEG NFR BLD: 64 % (ref 32–75)
NRBC # BLD: 0.02 K/UL (ref 0–0.01)
NRBC BLD-RTO: 0.3 PER 100 WBC
PLATELET # BLD AUTO: 262 K/UL (ref 150–400)
PMV BLD AUTO: 10.5 FL (ref 8.9–12.9)
POTASSIUM SERPL-SCNC: 4.6 MMOL/L (ref 3.5–5.1)
RBC # BLD AUTO: 2.76 M/UL (ref 4.1–5.7)
SERVICE CMNT-IMP: ABNORMAL
SERVICE CMNT-IMP: NORMAL
SODIUM SERPL-SCNC: 143 MMOL/L (ref 136–145)
WBC # BLD AUTO: 7.5 K/UL (ref 4.1–11.1)

## 2019-06-20 PROCEDURE — 82962 GLUCOSE BLOOD TEST: CPT

## 2019-06-20 PROCEDURE — 85025 COMPLETE CBC W/AUTO DIFF WBC: CPT

## 2019-06-20 PROCEDURE — 80048 BASIC METABOLIC PNL TOTAL CA: CPT

## 2019-06-20 PROCEDURE — 74011250636 HC RX REV CODE- 250/636: Performed by: HOSPITALIST

## 2019-06-20 PROCEDURE — 36415 COLL VENOUS BLD VENIPUNCTURE: CPT

## 2019-06-20 PROCEDURE — 74011250637 HC RX REV CODE- 250/637: Performed by: FAMILY MEDICINE

## 2019-06-20 PROCEDURE — 74011250637 HC RX REV CODE- 250/637: Performed by: HOSPITALIST

## 2019-06-20 PROCEDURE — 65270000029 HC RM PRIVATE

## 2019-06-20 PROCEDURE — 94760 N-INVAS EAR/PLS OXIMETRY 1: CPT

## 2019-06-20 RX ORDER — SODIUM CHLORIDE, SODIUM LACTATE, POTASSIUM CHLORIDE, CALCIUM CHLORIDE 600; 310; 30; 20 MG/100ML; MG/100ML; MG/100ML; MG/100ML
50 INJECTION, SOLUTION INTRAVENOUS CONTINUOUS
Status: DISCONTINUED | OUTPATIENT
Start: 2019-06-20 | End: 2019-06-21

## 2019-06-20 RX ADMIN — Medication 10 ML: at 06:28

## 2019-06-20 RX ADMIN — SODIUM CHLORIDE, SODIUM LACTATE, POTASSIUM CHLORIDE, AND CALCIUM CHLORIDE 50 ML/HR: 600; 310; 30; 20 INJECTION, SOLUTION INTRAVENOUS at 16:40

## 2019-06-20 RX ADMIN — TIMOLOL MALEATE 1 DROP: 5 SOLUTION OPHTHALMIC at 09:24

## 2019-06-20 RX ADMIN — Medication 10 ML: at 21:05

## 2019-06-20 RX ADMIN — POLYETHYLENE GLYCOL 3350 17 G: 17 POWDER, FOR SOLUTION ORAL at 09:23

## 2019-06-20 RX ADMIN — POTASSIUM CHLORIDE 10 MEQ: 750 TABLET, FILM COATED, EXTENDED RELEASE ORAL at 09:24

## 2019-06-20 RX ADMIN — THERA TABS 1 TABLET: TAB at 09:23

## 2019-06-20 RX ADMIN — SIMVASTATIN 20 MG: 20 TABLET, FILM COATED ORAL at 21:05

## 2019-06-20 RX ADMIN — TIMOLOL MALEATE 1 DROP: 5 SOLUTION OPHTHALMIC at 17:34

## 2019-06-20 RX ADMIN — Medication: at 09:23

## 2019-06-20 RX ADMIN — Medication 10 ML: at 16:39

## 2019-06-20 NOTE — DIABETES MGMT
[facility]    DTC Progress Note    Recommendations/ Comments:Review for hypoglycemia; was better on IV dextrose, was stopped this am and low at 1103. Pt has not received any correction insulin. DTC to continue to follow. Current hospital DM medication: lispro insulin correction scale-high sensitivity    Chart reviewed on [facility] Wallace. Patient is a 80 y.o. male with known Type 2 Diabetes on none at home. A1c: Pt had transfusion on 6/11/19 at 1811  Lab Results   Component Value Date/Time    Hemoglobin A1c <3.5 (L) 06/12/2019 03:50 AM       Recent Glucose Results:   Lab Results   Component Value Date/Time    GLU 99 06/20/2019 01:10 AM    GLUCPOC 81 06/20/2019 11:22 AM    GLUCPOC 74 06/20/2019 11:03 AM    GLUCPOC 100 06/20/2019 06:55 AM        Lab Results   Component Value Date/Time    Creatinine 1.69 (H) 06/20/2019 01:10 AM     Estimated Creatinine Clearance: 27.7 mL/min (A) (based on SCr of 1.69 mg/dL (H)). Active Orders   Diet    DIET CARDIAC Regular; 2 GM NA (House Low NA)        PO intake:   Patient Vitals for the past 72 hrs:   % Diet Eaten   06/20/19 1236 75 %   06/20/19 0842 100 %   06/19/19 1307 100 %   06/19/19 0856 100 %   06/18/19 1800 95 %   06/18/19 1400 100 %       Will continue to follow as needed.     Thank you          Time spent: 3 min

## 2019-06-20 NOTE — ADT AUTH CERT NOTES
Utilization Reviews  
 
   
Letter of Status Determination by Carlyn Kearney RN  
 
   
Review Status Review Entered In Primary 6/19/2019 16:19  
   
Criteria Review Letter of Status Determination:  
Recommend hospitalization status upgraded from  
OBSERVATION  to INPATIENT  Status 
   
Pt Name:  Amish Gomez MR#  
TANISHA # [id number] 
[id number] Payor: Pilar Castorena / Plan: [facility] FOR SPECIALTY CARE MEDICARE COMPLETE / Product Type: Managed Care Medicare /   
TERRYPhenomix#                                                                          [id number] [facility]  [email or web address]  
Hospitalization date  6/11/2019  5:42 PM  
Current Attending Viri Orellana MD  
Principal diagnosis  [address] [facility]   
 An 31-year-old -American male with past medical history of type 2 diabetes mellitus, CHF, hypertension, chronic pain, cataract presented to Emergency Department from home with no chief complaint.  The patient unfortunately is a poor historian.  Majority of the history was obtained from review of ED and electronic medical records.  Per their collective reports, the patient notably has been referred from his gastroenterologist - Dr. Marilee Kim office. Jesús Muller patient notably was anemic with hemoglobin of 6.1 on 06/05/2019. Elmon Schooling arrival to the emergency department, initially recorded vital signs; blood pressure 159/60, heart rate 61, respirations 16, O2 saturation 98% on room air.  The workup included hemoglobin at 6.6.  ED then consulted hospitalist for further evaluation and treatment.  In the interim, blood transfusion had already been started.  The patient otherwise has no complaints. Pt had a sustained rise in serum creatinine, was supposed to undergo balloon aortic valvuloplasty  but that was deferred, Pt s/p pRBC x1 unit, Pt with severe AS high risk for EGD, was also dehydrated and hypernatremic  
  
   
[facility]  
     
 STATUS DETERMINATION  Inpatient  
 The final decision of the patient's hospitalization status depends on the attending physician's judgment   
Additional comments     
Payor: [facility] MEDICARE COMPLETE / Plan: [facility] MEDICARE COMPLETE / Product Type: Managed Care Medicare Leslie Suazo MD 
  
   
   
Anemia, Iron Deficiency or Unspecified - Care Day 3 (6/19/2019) by Casey Deleon, RN  
 
   
Review Status Review Entered Completed 6/19/2019 16:18  
   
Criteria Review Care Day: 3 Care Date: 6/19/2019 Level of Care: Inpatient Floor Guideline Day 1 Level Of Care (X) ICU[D]or floor Clinical Status ( ) * Clinical Indications met[E] Activity (X) Activity as tolerated 6/19/2019 16:18:21 EDT by Casey Deleon AMBULATE WITH ASSIST. OT AND PT CONSULTS. OOB IN CHAIR. Routes  
(X) Oral hydration, medications 6/19/2019 16:18:21 EDT by Casey Deleon MIRALAX PACKET 17GRAMS PO QD. KCL 10 MEQ PO QD.ZOCOR 20MG PO QHS. MVI PO QD. (X) Diet as tolerated 6/19/2019 16:18:21 EDT by Casey Deleon CARDIAC REGULAR 2 GM NA. SNAKCS TID.   
  
(X) Possible IV fluids 6/19/2019 16:18:21 EDT by Casey Deleon IV D5NS @ 75ML/HOUR. Interventions (X) Laboratory tests 6/19/2019 16:18:21 EDT by Casey Deleon . BUN 32. CREAT 1.39. CA 7.9. GFR 58. (X) Possible endoscopy 6/19/2019 16:18:21 EDT by Casey Nurse GI deferred w/u(EGD,colonoscopy) as patient has severe AS and GI would like cardiologist clearance. (X) Possible transfusion 6/19/2019 16:18:21 EDT by Casey Deleon ONE UNIT ON ADMIT. * Milestone Additional Notes 6/19/19 IP  
  
TEMP 97.8 P 63 RR 20 /66 SPO2 99%. CBC IN AM. BMP DAILY. SCDS. CONTINUOUS BLADDER CHECKS. I+O Q8H. CONTINUOUS VS Q4H. GLUCOSE MANAGEMENT PER PROTOCOL. SPOT CHECK OXIMETRY. SKIN CAR AND SKIN CHECKS CONTINUOUS. SKIN CARE PRECAUTIONS. WOUND CARE QOD TO LEFT POSTERIOR LEG ULCER. D50W 12. 5GRAMS IV X 1.   
LISPRO SSI SC QACHS. INTERNAL MED> KEIKO on stage III CKD  
-Sudden bump in creatinine. Stopped lasix and antihypertensive. Start Iv fluid.  
-Creatinine improved. Contune iv fluids. BMP in AM  
   
   
Severe iron deficiency anemia,acute on chronic.    
-Transfused 1 unit of packed red blood cells. Hb remained stable . -GI deferred w/u(EGD,colonoscopy) as patient has severe AS and GI would like cardiologist clearance.  
   
Severe AS  
-Plan was for balloon aortic valvuloplasty on 6/18. Creatinine bumped and procedure was referred.  
   
   
   
Acute hypernatremia. resolved.  
   
Dehydration. resolved.   
   
Type 2 diabetes mellitus.  Place on Humalog insulin correction coverage schedule, Accu-Chek  
   
Hypertension. BP low side,creatinien rising. Hold antihypertensives Lesle Gulling Hyperlipidemia.  Continue on simvastatin.  
   
Chronicdiastolic CHF,NYHA class,I-II. LAsix held due to rising creatinine.  
   
   
   
06/19/19 1345 INITIAL PHYSICIAN ORDER: INPATIENT Medical; 3. Patient receiving treatment that can only be provided in an inpatient setting (further clarification in H&P documentation) ([address]) [address] Provider: Angelo Zamudio MD   
User who entered the order: Angelo Zamudio MD   
  
References: CLICK HERE-** FAQ-NEW CMS RULES FOR INPATIENT CERTIFICATION **  
Question Answer Comment Status: INPATIENT Type of Bed Medical   
Inpatient Hospitalization Certified Necessary for the Following Reasons 3. Patient receiving treatment that can only be provided in an inpatient setting (further clarification in H&P documentation) Admitting Diagnosis Severe anemia Admitting Diagnosis KEIKO (acute kidney injury) ([facility] [address] Admitting Physician Tacos Mendoza Attending Physician Tacos Mendoza Estimated Length of Stay 3-4 Midnights Discharge Plan: Other (Specify) 06/19/19 1341  
  
  
  
   
 Anemia, Iron Deficiency or Unspecified - Care Day 2 (6/18/2019) by Noe Campos RN  
 
   
Review Status Review Entered Completed 6/18/2019 16:14  
   
Criteria Review Care Day: 2 Care Date: 6/18/2019 Level of Care: Inpatient Floor Guideline Day 1 Level Of Care (X) ICU[D]or floor 6/18/2019 16:14:34 EDT by Noe Campos MEDICAL. Clinical Status ( ) * Clinical Indications met[E] Activity (X) Activity as tolerated 6/18/2019 16:14:34 EDT by Noe Campos AMBULATE WITH ASSIST. OT AND PT CONSULTS. OOB IN CHAIR. Routes  
(X) Oral hydration, medications 6/18/2019 16:14:34 EDT by Noe Campos NORVASC 10MG PO QD. 
LASIX 40MG PO QD. KCL 10 MEQ PO QD. ZOCOR 20MG PO QHS. MVI PO QD. (X) Diet as tolerated 6/18/2019 16:14:34 EDT by Noe Campos CARDIAC 2GM NA DIET WITH SNACKS TID.   
  
(X) Possible IV fluids 6/18/2019 16:14:34 EDT by Noe Campos IV D5NS @ 75ML/HOUR. Interventions (X) Laboratory tests 6/18/2019 16:14:34 EDT by Noe Campos NRBC 0.4. RBC 3.18. HGB 8.8. HCT 29.5. BUN 38. CREAT 1.86. CA 7.8. GFR 42. ALB 2.4. GLOB 4.3. BNP 5,997. (X) Possible endoscopy 6/18/2019 16:14:34 EDT by Noe Campos CARDIAC CLEARANCE PRIOR. (X) Possible transfusion 6/18/2019 16:14:34 EDT by Noe Campos ONE UNIT ON ADMIT. * Milestone Additional Notes 6/18/19 DAY 2 OBS  
  
98.3 P 70 RR 16 /55 SPO2 99%. D50W 12. 5GRAMS IV X 1 AND 25 GRAMS IV X 1.   
LISPRO SSI SC QACHS. FULL CODE. SCDS. I+O. CONTINUOUS VS Q4H. GLUCOSE MANAGEMENT PER PROTOCOL. SPOT CHECK OXIMETRY. SKIN CARE AND CHECKS. SKIN CARE PRECAUTIONS. POC GLUCOSE QACHS. DAILY BMP.  
WOUND CARE QOD LEFT POSTERIOR LEG. INTERNAL MED> Severe iron deficiency anemia,acute on chronic.    
-Transfused 1 unit of packed red blood cells. Hb remained stable .    
-GI deferred w/u(EGD,colonoscopy) as patient has severe AS and GI would like cardiologist clearance.  
   
Severe AS  
-Plan was for balloon aortic valvuloplasty on 6/18. Creatinine bumped and procedure was referred.  
   
KEIKO on stage III CKD  
-Sudden bump in creatinine. Stopped lasix and antihypertensive. Start Iv fluid. BMP in AM  
-Dr Greg Alvarado postponed aortic valvuloplasty   
   
   
Acute hypernatremia. resolved.  
   
Dehydration. resolved.   
   
Type 2 diabetes mellitus.  Place on Humalog insulin correction coverage schedule, Accu-Chek  
   
Hypertension. BP low side,creatinien rising. Hold antihypertensives Rosanne Chess Hyperlipidemia.  Continue on simvastatin.  
   
Chronicdiastolic CHF,NYHA class,I-II. LAsix held due to rising creatinine.

## 2019-06-20 NOTE — PROGRESS NOTES
Bedside shift change report given to Mayela Maurer (oncoming nurse) by Flo Rudolph (offgoing nurse). Report included the following information SBAR, Kardex, MAR and Recent Results.

## 2019-06-20 NOTE — PROGRESS NOTES
Hospitalist Progress Note    NAME: Lilly Blank   :  1929   MRN:  [id number]       Assessment / Plan:    Subjective:  -No new complaints. KEIKO on stage III CKD  -Sudden bump in creatinine. Stopped lasix and antihypertensive,continue iv fluids  -Creatinine improving. BMP in AM      Severe iron deficiency anemia,acute on chronic.    -Transfused 1 unit of packed red blood cells. Hb remained stable . -GI deferred w/u(EGD,colonoscopy) as patient has severe AS and GI would like cardiologist clearance. Severe AS  -Plan was for balloon aortic valvuloplasty on . Creatinine bumped and procedure was referred. Acute hypernatremia. resolved. Dehydration. resolved. Type 2 diabetes mellitus. A1c 3.5.  -BG 74 today, asymptomatic. He has no received any hypoglycemics last few days. he is eating okay. Monitor. He does not need hypoglycemics on discharge. Hypertension. BP low side,creatinien rising. Hold antihypertensives  . Hyperlipidemia. Continue on simvastatin. Chronicdiastolic CHF,NYHA class,I-II. LAsix held due to rising creatinine. Dispo: anticipate SNF in AM in creatinine trends down. Subjective:     Chief Complaint / Reason for Physician Visit  \"no complaints today   \". Discussed with RN events overnight. Review of Systems:  Symptom Y/N Comments  Symptom Y/N Comments   Fever/Chills    Chest Pain     Poor Appetite    Edema     Cough    Abdominal Pain     Sputum    Joint Pain     SOB/JOSUE    Pruritis/Rash     Nausea/vomit    Tolerating PT/OT     Diarrhea    Tolerating Diet     Constipation    Other       Could NOT obtain due to:      Objective:     VITALS:   Last 24hrs VS reviewed since prior progress note.  Most recent are:  Patient Vitals for the past 24 hrs:   Temp Pulse Resp BP SpO2   19 1411 98.2 °F (36.8 °C) 70 16 119/64 97 %   19 0849 98.5 °F (36.9 °C) 67 17 96/54 98 %   19 0248 98.1 °F (36.7 °C) 74 18 91/56 95 %   19     92 %   19 2006 98 °F (36.7 °C) 75 18 113/63 99 %       Intake/Output Summary (Last 24 hours) at 6/20/2019 1527  Last data filed at 6/20/2019 1505  Gross per 24 hour   Intake 635 ml   Output 850 ml   Net -215 ml        PHYSICAL EXAM:  General: WD, WN. Alert, cooperative, no acute distress    EENT:  EOMI. Anicteric sclerae. MMM  Resp:  CTA bilaterally, no wheezing or rales. No accessory muscle use  CV:  Grade III systolic murmur A2 area. GI:  Soft, Non distended, Non tender.  +Bowel sounds  Neurologic:  Alert and oriented X 3, normal speech,   Psych:   Good insight. Not anxious nor agitated  Skin:  No rashes. No jaundice    Reviewed most current lab test results and cultures  YES  Reviewed most current radiology test results   YES  Review and summation of old records today    NO  Reviewed patient's current orders and MAR    YES  PMH/SH reviewed - no change compared to H&P  ________________________________________________________________________  Care Plan discussed with:    Comments   Patient     Family      RN     Care Manager     Consultant                        Multidiciplinary team rounds were held today with , nursing, pharmacist and clinical coordinator. Patient's plan of care was discussed; medications were reviewed and discharge planning was addressed. ________________________________________________________________________  Total NON critical care TIME:  35 Minutes    Total CRITICAL CARE TIME Spent:   Minutes non procedure based      Comments   >50% of visit spent in counseling and coordination of care     ________________________________________________________________________  Noreen Stewart MD     Procedures: see electronic medical records for all procedures/Xrays and details which were not copied into this note but were reviewed prior to creation of Plan. LABS:  I reviewed today's most current labs and imaging studies.   Pertinent labs include:  Recent Labs     06/20/19  0110 06/18/19 0139   WBC 7.5 8.0   HGB 7.6* 8.8*   HCT 25.9* 29.5*    294     Recent Labs     06/20/19  0110 06/19/19  0443 06/18/19 0139    144 140   K 4.6 3.8 4.0   * 111* 106   CO2 27 28 29   GLU 99 79 89   BUN 33* 32* 38*   CREA 1.69* 1.39* 1.86*   CA 7.5* 7.9* 7.8*   MG  --   --  1.9   ALB  --   --  2.4*   TBILI  --   --  0.2   SGOT  --   --  16   ALT  --   --  14       Signed:  Jacky Vance MD

## 2019-06-20 NOTE — PROGRESS NOTES
Problem: Falls - Risk of  Goal: *Absence of Falls  Description  Document Matthew Rojas Fall Risk and appropriate interventions in the flowsheet. Outcome: Progressing Towards Goal     Problem: Patient Education: Go to Patient Education Activity  Goal: Patient/Family Education  Outcome: Progressing Towards Goal     Problem: Falls - Risk of  Goal: *Absence of Falls  Description  Document Matthew Deckercho Fall Risk and appropriate interventions in the flowsheet.   Outcome: Progressing Towards Goal     Problem: Patient Education: Go to Patient Education Activity  Goal: Patient/Family Education  Outcome: Progressing Towards Goal

## 2019-06-20 NOTE — PROGRESS NOTES
HYPOGLYCEMIC EPISODE DOCUMENTATION    Patient with hypoglycemic episode(s) at 0640(time) on 06/20/19(date). BG value(s) pre-treatment 68    Was patient symptomatic?  [] yes, [] no  Patient was treated with the following rescue medications/treatments: [] D50                [] Glucose tablets                [] Glucagon                [x] 4oz juice                [] 6oz reg soda                [] 8oz low fat milk  BG value post-treatment: 100  Once BG treated and value greater than 80mg/dl, pt was provided with the following:  [] snack  [] meal  Name of MD notified:n/a  The following orders were received: n/a

## 2019-06-20 NOTE — PROGRESS NOTES
Transition of Care Plan: Dc to SNF at [facility] when medically cleared    Chart reviewed, [facility] has accepted patient and has received auth for patient from insurance. Cm placed call to Dr Karen Dumas and patient needs 1 more day for renal function to improve.      Daxacarla Miranda, [facility]

## 2019-06-20 NOTE — PROGRESS NOTES
HYPOGLYCEMIC EPISODE DOCUMENTATION    Patient with hypoglycemic episode(s) at 1107 (time) on 6/20/19 (date). BG value(s) pre-treatment 76    Was patient symptomatic? [] yes, [x] no  Patient was treated with the following rescue medications/treatments: [] D50                [] Glucose tablets                [] Glucagon                [x] 8 oz juice                [] 6oz reg soda                [] 8oz low fat milk  BG value post-treatment: 81  Once BG treated and value greater than 80mg/dl, pt was provided with the following:  [x] snack  [x] meal  Name of MD notified:Dr. Eva Lorenzo  The following orders were received: None. Continue to monitor.

## 2019-06-21 ENCOUNTER — APPOINTMENT (OUTPATIENT)
Dept: GENERAL RADIOLOGY | Age: 84
DRG: 812 | End: 2019-06-21
Attending: HOSPITALIST
Payer: MEDICARE

## 2019-06-21 LAB
ANION GAP SERPL CALC-SCNC: 5 MMOL/L (ref 5–15)
BASOPHILS # BLD: 0 K/UL (ref 0–0.1)
BASOPHILS NFR BLD: 0 % (ref 0–1)
BUN SERPL-MCNC: 40 MG/DL (ref 6–20)
BUN/CREAT SERPL: 26 (ref 12–20)
CALCIUM SERPL-MCNC: 7.8 MG/DL (ref 8.5–10.1)
CHLORIDE SERPL-SCNC: 112 MMOL/L (ref 97–108)
CO2 SERPL-SCNC: 26 MMOL/L (ref 21–32)
CREAT SERPL-MCNC: 1.55 MG/DL (ref 0.7–1.3)
DIFFERENTIAL METHOD BLD: ABNORMAL
EOSINOPHIL # BLD: 0.3 K/UL (ref 0–0.4)
EOSINOPHIL NFR BLD: 4 % (ref 0–7)
ERYTHROCYTE [DISTWIDTH] IN BLOOD BY AUTOMATED COUNT: 19.8 % (ref 11.5–14.5)
GLUCOSE BLD STRIP.AUTO-MCNC: 84 MG/DL (ref 65–100)
GLUCOSE BLD STRIP.AUTO-MCNC: 85 MG/DL (ref 65–100)
GLUCOSE BLD STRIP.AUTO-MCNC: 89 MG/DL (ref 65–100)
GLUCOSE BLD STRIP.AUTO-MCNC: 95 MG/DL (ref 65–100)
GLUCOSE SERPL-MCNC: 77 MG/DL (ref 65–100)
HCT VFR BLD AUTO: 25.3 % (ref 36.6–50.3)
HGB BLD-MCNC: 7.6 G/DL (ref 12.1–17)
IMM GRANULOCYTES # BLD AUTO: 0.1 K/UL (ref 0–0.04)
IMM GRANULOCYTES NFR BLD AUTO: 1 % (ref 0–0.5)
LYMPHOCYTES # BLD: 1.5 K/UL (ref 0.8–3.5)
LYMPHOCYTES NFR BLD: 20 % (ref 12–49)
MCH RBC QN AUTO: 27.8 PG (ref 26–34)
MCHC RBC AUTO-ENTMCNC: 30 G/DL (ref 30–36.5)
MCV RBC AUTO: 92.7 FL (ref 80–99)
MONOCYTES # BLD: 0.9 K/UL (ref 0–1)
MONOCYTES NFR BLD: 12 % (ref 5–13)
NEUTS SEG # BLD: 4.8 K/UL (ref 1.8–8)
NEUTS SEG NFR BLD: 63 % (ref 32–75)
NRBC # BLD: 0 K/UL (ref 0–0.01)
NRBC BLD-RTO: 0 PER 100 WBC
PLATELET # BLD AUTO: 271 K/UL (ref 150–400)
PMV BLD AUTO: 10.4 FL (ref 8.9–12.9)
POTASSIUM SERPL-SCNC: 4.8 MMOL/L (ref 3.5–5.1)
RBC # BLD AUTO: 2.73 M/UL (ref 4.1–5.7)
SERVICE CMNT-IMP: NORMAL
SODIUM SERPL-SCNC: 143 MMOL/L (ref 136–145)
WBC # BLD AUTO: 7.6 K/UL (ref 4.1–11.1)

## 2019-06-21 PROCEDURE — 36415 COLL VENOUS BLD VENIPUNCTURE: CPT

## 2019-06-21 PROCEDURE — 82962 GLUCOSE BLOOD TEST: CPT

## 2019-06-21 PROCEDURE — 74011250637 HC RX REV CODE- 250/637: Performed by: HOSPITALIST

## 2019-06-21 PROCEDURE — 74011250636 HC RX REV CODE- 250/636: Performed by: HOSPITALIST

## 2019-06-21 PROCEDURE — 65270000029 HC RM PRIVATE

## 2019-06-21 PROCEDURE — 71045 X-RAY EXAM CHEST 1 VIEW: CPT

## 2019-06-21 PROCEDURE — 94761 N-INVAS EAR/PLS OXIMETRY MLT: CPT

## 2019-06-21 PROCEDURE — 85025 COMPLETE CBC W/AUTO DIFF WBC: CPT

## 2019-06-21 PROCEDURE — 80048 BASIC METABOLIC PNL TOTAL CA: CPT

## 2019-06-21 PROCEDURE — 74011250637 HC RX REV CODE- 250/637: Performed by: FAMILY MEDICINE

## 2019-06-21 RX ORDER — FUROSEMIDE 10 MG/ML
20 INJECTION INTRAMUSCULAR; INTRAVENOUS DAILY
Status: DISCONTINUED | OUTPATIENT
Start: 2019-06-22 | End: 2019-06-22 | Stop reason: HOSPADM

## 2019-06-21 RX ADMIN — TIMOLOL MALEATE 1 DROP: 5 SOLUTION OPHTHALMIC at 08:56

## 2019-06-21 RX ADMIN — SODIUM CHLORIDE, SODIUM LACTATE, POTASSIUM CHLORIDE, AND CALCIUM CHLORIDE 50 ML/HR: 600; 310; 30; 20 INJECTION, SOLUTION INTRAVENOUS at 11:53

## 2019-06-21 RX ADMIN — SIMVASTATIN 20 MG: 20 TABLET, FILM COATED ORAL at 21:00

## 2019-06-21 RX ADMIN — POTASSIUM CHLORIDE 10 MEQ: 750 TABLET, FILM COATED, EXTENDED RELEASE ORAL at 08:55

## 2019-06-21 RX ADMIN — THERA TABS 1 TABLET: TAB at 08:54

## 2019-06-21 RX ADMIN — Medication 10 ML: at 21:03

## 2019-06-21 RX ADMIN — Medication: at 08:55

## 2019-06-21 RX ADMIN — POLYETHYLENE GLYCOL 3350 17 G: 17 POWDER, FOR SOLUTION ORAL at 08:55

## 2019-06-21 RX ADMIN — Medication 10 ML: at 13:32

## 2019-06-21 RX ADMIN — TIMOLOL MALEATE 1 DROP: 5 SOLUTION OPHTHALMIC at 17:05

## 2019-06-21 NOTE — PROGRESS NOTES
Problem: Falls - Risk of  Goal: *Absence of Falls  Description  Document Amara Ruts Fall Risk and appropriate interventions in the flowsheet.   Outcome: Progressing Towards Goal

## 2019-06-21 NOTE — PROGRESS NOTES
Transition of Care Plan: To [facility] SNF when medically cleared    Patient to receive IV Lasix today, so will not dc today. Placed call to Admissions Liaison Neva at [facility] to notify her. They have received auth from patient's insurance so if patient becomes medically ready to dc over weekend, patient can still admit there, Cm will need to call Kyung Jimenez [phone] and she will notify buildingRox Nguyễn, [facility]

## 2019-06-21 NOTE — PROGRESS NOTES
Hospitalist Progress Note    NAME: Camilo Henriquez   :  1929   MRN:  [id number]       Assessment / Plan:    Subjective:  -Doing well  -No new complaints. KEIKO on stage III CKD  -Sudden bump in creatinine.  -Creatinine improving. Anticipate d/c today. Avoid nephrotoxins. BP still soft,need to continue to hold lasix and antihypertensives. He has severe AS could be prone to hypotension with diuretics and antihypertensives. Severe iron deficiency anemia,acute on chronic.    -Transfused 1 unit of packed red blood cells. Hb remained stable . -GI deferred w/u(EGD,colonoscopy) as patient has severe AS and GI would like cardiologist clearance. Severe AS  -Plan was for balloon aortic valvuloplasty on . Creatinine bumped and procedure was referred. Will be addressed as out patient Per Dr Cricket Child. Acute hypernatremia. resolved. Dehydration. resolved. Type 2 diabetes mellitus. A1c 3.5.  -BG 74 today, asymptomatic. He has no received any hypoglycemics last few days. he is eating okay. Monitor. He does not need hypoglycemics on discharge. Hypertension. BP low side,creatinien rising. Hold antihypertensives  . Hyperlipidemia. Continue on simvastatin. Chronic diastolic CHF,NYHA class,I-II. LAsix held due to rising creatinine. Dispo: anticipate SNF today. Subjective:     Chief Complaint / Reason for Physician Visit  \"no complaints today   \". Discussed with RN events overnight. Review of Systems:  Symptom Y/N Comments  Symptom Y/N Comments   Fever/Chills    Chest Pain     Poor Appetite    Edema     Cough    Abdominal Pain     Sputum    Joint Pain     SOB/JOSUE    Pruritis/Rash     Nausea/vomit    Tolerating PT/OT     Diarrhea    Tolerating Diet     Constipation    Other       Could NOT obtain due to:      Objective:     VITALS:   Last 24hrs VS reviewed since prior progress note.  Most recent are:  Patient Vitals for the past 24 hrs:   Temp Pulse Resp BP SpO2   19 0230 98.7 °F (37.1 °C) 70 16 91/51 95 %   06/21/19 0030     95 %   06/20/19 2026 98.6 °F (37 °C) 78 16 104/54 95 %   06/20/19 1411 98.2 °F (36.8 °C) 70 16 119/64 97 %   06/20/19 0849 98.5 °F (36.9 °C) 67 17 96/54 98 %       Intake/Output Summary (Last 24 hours) at 6/21/2019 0829  Last data filed at 6/21/2019 0824  Gross per 24 hour   Intake 995 ml   Output 670 ml   Net 325 ml        PHYSICAL EXAM:  General: WD, WN. Alert, cooperative, no acute distress    EENT:  EOMI. Anicteric sclerae. MMM  Resp:  CTA bilaterally, no wheezing or rales. No accessory muscle use  CV:  Grade III systolic murmur A2 area. GI:  Soft, Non distended, Non tender.  +Bowel sounds  Neurologic:  Alert and oriented X 3, normal speech,   Psych:   Good insight. Not anxious nor agitated  Skin:  No rashes. No jaundice    Reviewed most current lab test results and cultures  YES  Reviewed most current radiology test results   YES  Review and summation of old records today    NO  Reviewed patient's current orders and MAR    YES  PMH/SH reviewed - no change compared to H&P  ________________________________________________________________________  Care Plan discussed with:    Comments   Patient     Family      RN     Care Manager     Consultant                        Multidiciplinary team rounds were held today with , nursing, pharmacist and clinical coordinator. Patient's plan of care was discussed; medications were reviewed and discharge planning was addressed.      ________________________________________________________________________  Total NON critical care TIME:  35 Minutes    Total CRITICAL CARE TIME Spent:   Minutes non procedure based      Comments   >50% of visit spent in counseling and coordination of care     ________________________________________________________________________  Grabiel Ear, MD     Procedures: see electronic medical records for all procedures/Xrays and details which were not copied into this note but were reviewed prior to creation of Plan. LABS:  I reviewed today's most current labs and imaging studies. Pertinent labs include:  Recent Labs     06/21/19 0459 06/20/19  0110   WBC 7.6 7.5   HGB 7.6* 7.6*   HCT 25.3* 25.9*    262     Recent Labs     06/21/19  0459 06/20/19  0110 06/19/19  0443    143 144   K 4.8 4.6 3.8   * 112* 111*   CO2 26 27 28   GLU 77 99 79   BUN 40* 33* 32*   CREA 1.55* 1.69* 1.39*   CA 7.8* 7.5* 7.9*       Signed:  Farshad Miller MD

## 2019-06-21 NOTE — PROGRESS NOTES
Reviewed CXR fild and report which shows evidence of pul edema,more prominent on the right. D/c iv fluid. Diurese with iv lasix. BP is labile and in the setting of severe AS,would like to monitor on iv diuretics. Hold planned discharge for today.

## 2019-06-21 NOTE — PROGRESS NOTES
visited pt in room 207. Pt seemed in good spirits.  listened as pt shared that he feels blessed that he is still alive. Pt was appreciative of visit and stated that he looks forward to the [employer]" each day.  prayed and assured pt of continued prayer and spiritual blessing.     Chaplain Watson Intern, MDiv  [phone]

## 2019-06-21 NOTE — PROGRESS NOTES
Bedside and Verbal shift change report given to Eloy Linton RN (oncoming nurse) by Ann Coyle RN (offgoing nurse). Report included the following information SBAR, Kardex, MAR and Recent Results.

## 2019-06-21 NOTE — WOUND CARE
Wound Consult: Follow Up Visit. Chart reviewed. Consulted for lower leg wound on admission. Patient is resting on a Christiano bed. Denies pain in legs with care. Lac Hydrin in use for bilateral lower legs since admission and Xeroform to posterior left lower leg ulcer POA. Assessment/Treatment: 
Posterior left lower leg ulcer - 7 x 2 x 0.1 cm, 100% red, no odor or purulence, serous to serosanguinous drainage; anjelica-wound with hypopigmented resurfaced intact skin. Both legs with bark like texture - washed with soap and water today and removed layers of dry hyerpkeratotic skin with intact skin beneath. Xeroform and dry dressing to left leg. No discoloration to heels. Wound Recommendations: 
Continue Lac Hydrin lotion to lower legs; xeroform and dressing to left posterior lower leg. Skin Care Recommendations: 1. Minimize friction/shear: minimize layers of linen/pads under patient. 2. Off load pressure/reposition: continue to turn and reposition approximately every 2 hours; float heels. 3. Manage Moisture - keep skin folds dry; incontinence skin care as needed; promote continence, patient does use urinal.  Toileting rounds. 4. Continue to monitor nutrition, pain, and skin risk scale, and skin assessment. Plan: We will continue to reassess routinely and as needed. Tin Hayes RN,[facility] [phone] Pager [phone]

## 2019-06-22 VITALS
DIASTOLIC BLOOD PRESSURE: 60 MMHG | HEIGHT: 67 IN | SYSTOLIC BLOOD PRESSURE: 134 MMHG | TEMPERATURE: 97.5 F | OXYGEN SATURATION: 97 % | RESPIRATION RATE: 18 BRPM | BODY MASS INDEX: 26.3 KG/M2 | WEIGHT: 167.55 LBS | HEART RATE: 64 BPM

## 2019-06-22 LAB
ANION GAP SERPL CALC-SCNC: 4 MMOL/L (ref 5–15)
BUN SERPL-MCNC: 37 MG/DL (ref 6–20)
BUN/CREAT SERPL: 28 (ref 12–20)
CALCIUM SERPL-MCNC: 7.9 MG/DL (ref 8.5–10.1)
CHLORIDE SERPL-SCNC: 111 MMOL/L (ref 97–108)
CO2 SERPL-SCNC: 26 MMOL/L (ref 21–32)
CREAT SERPL-MCNC: 1.34 MG/DL (ref 0.7–1.3)
GLUCOSE BLD STRIP.AUTO-MCNC: 70 MG/DL (ref 65–100)
GLUCOSE BLD STRIP.AUTO-MCNC: 86 MG/DL (ref 65–100)
GLUCOSE BLD STRIP.AUTO-MCNC: 90 MG/DL (ref 65–100)
GLUCOSE SERPL-MCNC: 78 MG/DL (ref 65–100)
POTASSIUM SERPL-SCNC: 4.6 MMOL/L (ref 3.5–5.1)
SERVICE CMNT-IMP: NORMAL
SODIUM SERPL-SCNC: 141 MMOL/L (ref 136–145)

## 2019-06-22 PROCEDURE — 74011250637 HC RX REV CODE- 250/637: Performed by: HOSPITALIST

## 2019-06-22 PROCEDURE — 80048 BASIC METABOLIC PNL TOTAL CA: CPT

## 2019-06-22 PROCEDURE — 82962 GLUCOSE BLOOD TEST: CPT

## 2019-06-22 PROCEDURE — 74011250636 HC RX REV CODE- 250/636: Performed by: HOSPITALIST

## 2019-06-22 PROCEDURE — 36415 COLL VENOUS BLD VENIPUNCTURE: CPT

## 2019-06-22 PROCEDURE — 74011250637 HC RX REV CODE- 250/637: Performed by: FAMILY MEDICINE

## 2019-06-22 RX ORDER — POLYETHYLENE GLYCOL 3350 17 G/17G
17 POWDER, FOR SOLUTION ORAL DAILY
Qty: 30 PACKET | Refills: 0 | Status: SHIPPED | OUTPATIENT
Start: 2019-06-23 | End: 2019-07-23

## 2019-06-22 RX ADMIN — FUROSEMIDE 20 MG: 10 INJECTION, SOLUTION INTRAMUSCULAR; INTRAVENOUS at 09:33

## 2019-06-22 RX ADMIN — POTASSIUM CHLORIDE 10 MEQ: 750 TABLET, FILM COATED, EXTENDED RELEASE ORAL at 09:33

## 2019-06-22 RX ADMIN — TIMOLOL MALEATE 1 DROP: 5 SOLUTION OPHTHALMIC at 09:34

## 2019-06-22 RX ADMIN — THERA TABS 1 TABLET: TAB at 09:33

## 2019-06-22 RX ADMIN — POLYETHYLENE GLYCOL 3350 17 G: 17 POWDER, FOR SOLUTION ORAL at 09:33

## 2019-06-22 RX ADMIN — Medication: at 09:34

## 2019-06-22 NOTE — PROGRESS NOTES
Bedside shift change report given to [address] (oncoming nurse) by Nya Mckee RN (offgoing nurse). Report included the following information SBAR, Kardex, MAR and Recent Results.

## 2019-06-22 NOTE — PROGRESS NOTES
HYPOGLYCEMIC EPISODE DOCUMENTATION    Patient with hypoglycemic episode(s) at 0630(time) on 6/22(date). BG value(s) pre-treatment 79    Was patient symptomatic?  [] yes, [x] no  Patient was treated with the following rescue medications/treatments: [] D50                [] Glucose tablets                [] Glucagon                [x] 4oz juice                [] 6oz reg soda                [] 8oz low fat milk  BG value post-treatment: 86  Once BG treated and value greater than 80mg/dl, pt was provided with the following:  [] snack  [x] meal  Name of MD notified:n/a  The following orders were received: followed protocol

## 2019-06-22 NOTE — DISCHARGE SUMMARY
Discharge Summary         PATIENT ID: Caty Hatfield  MRN: [id number]   YOB: 1929    DATE OF ADMISSION: 6/11/2019  5:42 PM    DATE OF DISCHARGE: 6/22/2019    PRIMARY CARE PROVIDER: Gato Valencia MD       ATTENDING PHYSICIAN: Jennifer Maravilla MD  DISCHARGING PROVIDER: Delonte Oviedo MD     To contact this individual call [phone] and ask the  to page. If unavailable ask to be transferred the Adult Hospitalist Department. CONSULTATIONS: IP CONSULT TO HOSPITALIST  IP CONSULT TO CARDIOLOGY  IP CONSULT TO HEMATOLOGY    PROCEDURES/SURGERIES: Procedure(s):  ESOPHAGOGASTRODUODENOSCOPY (EGD)  COLONOSCOPY    ADMITTING DIAGNOSES & HOSPITAL COURSE:   KEIKO on stage III CKD  -Creatinine improved to about baseline. Continue lasix. Check BMP on Monday and as needed thereafter. Avoid nephrotoxins. Severe iron deficiency anemia,acute on chronic.    -Transfused 1 unit of packed red blood cells. Hb remained stable . -GI deferred invasive work up (EGD,colonoscopy) as patient has severe Aortic Stenosis. Hemoglobin remained stable. Severe Aortic Stenosis  -Plan was for balloon aortic valvuloplasty inpatient ,however,kidney function worsened acutely,Dr Haley Wiseman decided to postpon the procedure to address as out patient when renal function is stable. Pulmonary edema per CXR on 6/21. Patient completely asymptomatic,no shortness of breath,was not hypoxic,no cough or fever. He received IV lasix 20 mg on 6/21 and 6/22,oral lasix he has been taking pre admission resumed on discharge. Repeat CXR in 1 week. Acute hypernatremia. resolved. Dehydration. resolved. Type 2 diabetes mellitus. A1c 3.5.  -Check blood sugar AC&HS. Use Humalog correction per facility protocol,starting from BG>180 to prevent hypoglycemia. History of Hypertension. Blood Pressure low side,he is severe aortic stenosis so he is preload dependent. Be cautious with BP medications and diuretics,as such his lisinopril and amlodipien were stopped as he run low normal BP,see below for most recent BP readings. 06/22/19 0231 108/87   06/21/19 2133 106/57   06/21/19 1436 111/58   06/21/19 0829 117/61   06/21/19 0230 91/51   06/21/19 0030    06/20/19 2026 104/54   06/20/19 1411 119/64   06/20/19 0849 96/54   06/20/19 0248 91/56       . Hyperlipidemia. Continue on simvastatin. Chronic diastolic CHF,NYHA class,I-II. Oral lasix. -Daily weight           Patient discharged in stable condition. Called and discussed d/c plan with granddaughter Erica([phone]),all questions answered and she is in agreement with d/c plans. DISCHARGE RECOMMENDATIONS     · CBC and BMP on Monday,and as needed subsequently. · CXR in a week  · Daily weight        PENDING TEST RESULTS:   At the time of discharge the following test results are still pending: none    FOLLOW UP APPOINTMENTS:    Follow-up Information     Follow up With Specialties Details Why Ronnell [address] [place] [address] [phone]      Daniel Seth MD Cardiology  office follow up needed to discuss timing of the aortic valve procedure [address]  [phone]             ADDITIONAL CARE RECOMMENDATIONS:     DIET: Regular Diet, Cardiac Diet and Diabetic Diet    TUBE FEEDING INSTRUCTIONS: NA    OXYGEN / BiPAP SETTINGS: NA    ACTIVITY: Activity as tolerated and PT/OT Eval and Treat    WOUND CARE: NA    EQUIPMENT needed: TBD      DISCHARGE MEDICATIONS:   See Medication Reconciliation Form      NOTIFY YOUR PHYSICIAN FOR ANY OF THE FOLLOWING:   Fever over 101 degrees for 24 hours. Chest pain, shortness of breath, fever, chills, nausea, vomiting, diarrhea, change in mentation, falling, weakness, bleeding. Severe pain or pain not relieved by medications. Or, any other signs or symptoms that you may have questions about.     DISPOSITION:    Home With:   OT  PT  Terrence Dailey RN x SNF/Inpatient Rehab/LTAC    Independent/assisted living    Hospice    Other:       PATIENT CONDITION AT DISCHARGE:     Functional status    Poor    x Deconditioned     Independent      Cognition    x Lucid     Forgetful     Dementia      Catheters/lines (plus indication)    West     PICC     PEG    x None      Code status    x Full code     DNR      PHYSICAL EXAMINATION AT DISCHARGE:        PHYSICAL EXAM:  General:          WD, WN. Alert, cooperative, no acute distress    EENT:              EOMI. Anicteric sclerae. MMM  Resp:               CTA bilaterally, no wheezing or rales. No accessory muscle use  CV:                  Grade III systolic murmur A2 area. GI:                   Soft, Non distended, Non tender. +Bowel sounds  Neurologic:      Alert and oriented X 3, normal speech,blind. Psych:             Good insight. Not anxious nor agitated  Skin:                No rashes. No jaundice      CHRONIC MEDICAL DIAGNOSES:  Problem List as of 6/22/2019 Date Reviewed: 5/21/2019          Codes Class Noted - Resolved    KEIKO (acute kidney injury) ([facility] [address] ICD-10-CM: N17.9  ICD-9-CM: 584.9  6/19/2019 - Present        Severe anemia ICD-10-CM: D64.9  ICD-9-CM: 285.9  6/19/2019 - Present        Aortic stenosis ICD-10-CM: I35.0  ICD-9-CM: 424.1  6/17/2019 - Present        GIB (gastrointestinal bleeding) ICD-10-CM: K92.2  ICD-9-CM: 578.9  6/17/2019 - Present        Dehydration ICD-10-CM: E86.0  ICD-9-CM: 276.51  6/11/2019 - Present        Acute hypernatremia ICD-10-CM: E87.0  ICD-9-CM: 276.0  6/11/2019 - Present        Abnormal EKG ICD-10-CM: R94.31  ICD-9-CM: 794.31  6/11/2019 - Present        CKD (chronic kidney disease) stage 3, GFR 30-59 ml/min ([facility]) ICD-10-CM: N18.3  ICD-9-CM: 585.3  2/7/2019 - Present        * (Principal) Anemia ICD-10-CM: D64.9  ICD-9-CM: 285.9  2/7/2019 - Present        Severe aortic stenosis ICD-10-CM: I35.0  ICD-9-CM: 424.1  12/21/2015 - Present        Fall ICD-10-CM: Guillaume Nicolas. Xavier Escalona  ICD-9-CM: E888.9  12/21/2015 - Present        Rhabdomyolysis ICD-10-CM: M62.82  ICD-9-CM: 728.88  12/19/2015 - Present        Cellulitis and abscess of leg, except foot ICD-10-CM: L03.119, L02.419  ICD-9-CM: 682.6  2/2/2011 - Present        Diabetes ([facility] [address] ICD-10-CM: E11.9  ICD-9-CM: 250.00  2/2/2011 - Present        Essential hypertension, benign (Chronic) ICD-10-CM: I10  ICD-9-CM: 401.1  2/2/2011 - Present        Hip joint replacement (Chronic) ICD-9-CM: V43.64  2/2/2011 - Present        Morbidly obese (HCC) (Chronic) ICD-10-CM: E66.01  ICD-9-CM: 278.01  2/2/2011 - Present        RESOLVED: Elevated troponin I level ICD-10-CM: R74.8  ICD-9-CM: 790.6  12/19/2015 - 12/21/2015        RESOLVED: Encephalopathy ICD-10-CM: G93.40  ICD-9-CM: 348.30  12/19/2015 - 12/21/2015        RESOLVED: Leukocytosis ICD-10-CM: B28.423  ICD-9-CM: 288.60  12/19/2015 - 12/21/2015                       DISCHARGE MEDICATIONS:  Current Discharge Medication List      START taking these medications    Details   polyethylene glycol (MIRALAX) 17 gram packet Take 1 Packet by mouth daily for 30 days. Qty: 30 Packet, Refills: 0         CONTINUE these medications which have NOT CHANGED    Details   multivitamin (ONE A DAY) tablet Take 1 Tab by mouth daily. simvastatin (ZOCOR) 20 mg tablet Take 1 Tab by mouth nightly. Qty: 30 Tab, Refills: 0      aspirin 81 mg chewable tablet Take 1 Tab by mouth daily. Qty: 30 Tab, Refills: 0      timolol (TIMOPTIC) 0.5 % ophthalmic solution Administer 1 Drop to left eye two (2) times a day. Qty: 10 mL, Refills: 0      furosemide (LASIX) 40 mg tablet Take 40 mg by mouth daily. potassium chloride SA (MICRO-K) 10 mEq capsule Take 10 mEq by mouth daily. Greater than 30 minutes were spent with the patient on counseling and coordination of care    Signed:    Lesvia Willams MD  6/22/2019  12:04 PM

## 2019-06-22 NOTE — PROGRESS NOTES
Transition of Care Plan    1. Patient being discharged today to [facility] for rehab [phone]  Envelope and ambulance form on chart. 2. [facility] arranged for 3 pm transport  3.  faxed discharge instructions to [phone]  4. Second Medicare letter signed and placed in chart  5. Family called by Dr aJyshree Hunter-- in agreement with discharge  6.  Nurse to call report to [phone]

## 2019-06-22 NOTE — DISCHARGE INSTRUCTIONS
Discharge SNF/Rehab Instructions/LTAC       PATIENT ID: Nasrin Farley  MRN: [id number]   YOB: 1929    DATE OF ADMISSION: 6/11/2019  5:42 PM    DATE OF DISCHARGE: 6/22/2019    PRIMARY CARE PROVIDER: Vamsi Sheehan MD       ATTENDING PHYSICIAN: Roslyn Valdez MD  DISCHARGING PROVIDER: Donato Alonzo MD     To contact this individual call [phone] and ask the  to page. If unavailable ask to be transferred the Adult Hospitalist Department. CONSULTATIONS: IP CONSULT TO HOSPITALIST  IP CONSULT TO CARDIOLOGY  IP CONSULT TO HEMATOLOGY    PROCEDURES/SURGERIES: Procedure(s):  ESOPHAGOGASTRODUODENOSCOPY (EGD)  COLONOSCOPY    ADMITTING DIAGNOSES & HOSPITAL COURSE:           KEIKO on stage III CKD  -Creatinine improved to about baseline. Continue lasix. Check BMP on Monday and as needed thereafter. Avoid nephrotoxins. Severe iron deficiency anemia,acute on chronic.    -Transfused 1 unit of packed red blood cells. Hb remained stable . -GI deferred invasive work up (EGD,colonoscopy) as patient has severe Aortic Stenosis. Hemoglobin remained stable. Severe Aortic Stenosis  -Plan was for balloon aortic valvuloplasty inpatient ,however,kidney function worsened acutely,Dr Darryl Collins decided to postpon the procedure to address as out patient when renal function is stable. Acute hypernatremia. resolved. Dehydration. resolved. Type 2 diabetes mellitus. A1c 3.5.  -Check blood sugar AC&HS. Use Humalog correction per facility protocol,starting from BG>180 to prevent hypoglycemia. History of Hypertension. Blood Pressure low side,he is severe aortic stenosis so he is preload dependent. Be cautious with BP medications and diuretics,as such his lisinopril and amlodipien were stopped as he run low normal BP,see below for most recent BP readings.   06/22/19 0231 108/87   06/21/19 2133 106/57   06/21/19 1436 111/58   06/21/19 0829 117/61   06/21/19 0230 91/51   06/21/19 0030 -- 06/20/19 2026 104/54   06/20/19 1411 119/64   06/20/19 0849 96/54   06/20/19 0248 91/56       . Hyperlipidemia. Continue on simvastatin. Chronic diastolic CHF,NYHA class,I-II. Oral lasix. Check daily weight. Pulmonary edema per CXR on 6/21. Patient completely asymptomatic,no shortness of breath,was not hypoxic,no cough or fever. He received IV lasix 20 mg on 6/21 and 6/22,oral lasix he has been taking pre admission resumed on discharge. Repeat CXR in 1 week. Patient discharged in stable condition. Called and discussed d/c plan with granddaughter Erica([phone]),all questions answered and she is in agreement with d/c plans. Patient Education        Aortic Valve Stenosis: Care Instructions  Your Care Instructions    Having aortic valve stenosis means that the valve between your heart and the large blood vessel that carries blood to the body (aorta) has narrowed. That forces the heart to pump harder to get enough blood through the valve. As stenosis gets worse, the valve gets narrower. This can cause symptoms. Symptoms include chest pain, dizziness, fainting, or shortness of breath. If you have mild or moderate stenosis and don't have symptoms, you may not need treatment now. Your doctor will check your heart regularly. You may need treatment if the stenosis gets worse. With severe stenosis, you may need to have the valve replaced. Follow-up care is a key part of your treatment and safety. Be sure to make and go to all appointments, and call your doctor if you are having problems. It's also a good idea to know your test results and keep a list of the medicines you take. How can you care for yourself at home? · Be safe with medicines. Take your medicines exactly as prescribed. Call your doctor if you think you are having a problem with your medicine. You will get more details on the specific medicines your doctor prescribes. · Plan your meals so that you are eating heart-healthy foods.   ? Eat a variety of foods daily. Fresh fruits and vegetables and whole grains are good choices. ? Limit your fat intake, especially saturated and trans fat. ? Limit salt (sodium). ? Increase fiber in your diet. ? Limit alcohol. · Be active. Ask your doctor what type and level of exercise is safe for you. Walking is a good choice. Your doctor may suggest that you join a cardiac rehabilitation program so that you can have help increasing your physical activity safely. · Do not smoke. Smoking can make aortic valve stenosis worse. If you need help quitting, talk to your doctor about stop-smoking programs and medicines. These can increase your chances of quitting for good. · Stay at a healthy weight. Lose weight if you need to. · Avoid colds and flu. Get a pneumococcal vaccine shot. If you have had one before, ask your doctor if you need another dose. Get a flu vaccine every year. · Take care of your teeth and gums. Get regular dental checkups. Good dental health is important because bacteria can spread from infected teeth and gums to the heart valves. When should you call for help? Call 911 anytime you think you may need emergency care. For example, call if:    · You passed out (lost consciousness).     · You have symptoms of a heart attack. These may include:  ? Chest pain or pressure, or a strange feeling in the chest.  ? Sweating. ? Shortness of breath. ? Nausea or vomiting. ? Pain, pressure, or a strange feeling in the back, neck, jaw, or upper belly or in one or both shoulders or arms. ? Lightheadedness or sudden weakness. ? A fast or irregular heartbeat.    After you call 911, the  may tell you to chew 1 adult-strength or 2 to 4 low-dose aspirin. Wait for an ambulance.  Do not try to drive yourself.   Call your doctor now or seek immediate medical care if:    · You develop new symptoms of aortic valve stenosis, such as chest pain, dizziness, or shortness of breath.     · You have new or increased shortness of breath.     · You are dizzy or lightheaded, or you feel like you may faint.     · You have sudden weight gain, such as more than 2 to 3 pounds in a day or 5 pounds in a week. (Your doctor may suggest a different range of weight gain.)     · You have increased swelling in your legs, ankles, or feet.    Watch closely for changes in your health, and be sure to contact your doctor if:    · You have trouble making healthy lifestyle changes.     · You want more information about healthy lifestyle changes. Where can you learn more? Go to http://veronica-heather.info/. Enter A525 in the search box to learn more about \"Aortic Valve Stenosis: Care Instructions. \"  Current as of: July 22, 2018  Content Version: 11.9  © 5429-5721 [employer]. Care instructions adapted under license by [employer] (which disclaims liability or warranty for this information). If you have questions about a medical condition or this instruction, always ask your healthcare professional. [place] [address] any warranty or liability for your use of this information. DISCHARGE RECOMMENDATIONS     · CBC and BMP on Monday,and as needed subsequently.   · CXR in one week  · Daily weight        PENDING TEST RESULTS:   At the time of discharge the following test results are still pending: none    FOLLOW UP APPOINTMENTS:    Follow-up Information     Follow up With Specialties Details Why Ronnell Wilson [address] [address]  [phone]      Jabari Gayle MD Cardiology  office follow up needed to discuss timing of the aortic valve procedure [address]  [phone]             ADDITIONAL CARE RECOMMENDATIONS:     DIET: Regular Diet, Cardiac Diet and Diabetic Diet    TUBE FEEDING INSTRUCTIONS: NA    OXYGEN / BiPAP SETTINGS: NA    ACTIVITY: Activity as tolerated and PT/OT Eval and Treat    WOUND CARE: NA    EQUIPMENT needed: TBD      DISCHARGE MEDICATIONS:   See Medication Reconciliation Form      NOTIFY YOUR PHYSICIAN FOR ANY OF THE FOLLOWING:   Fever over 101 degrees for 24 hours. Chest pain, shortness of breath, fever, chills, nausea, vomiting, diarrhea, change in mentation, falling, weakness, bleeding. Severe pain or pain not relieved by medications. Or, any other signs or symptoms that you may have questions about. DISPOSITION:    Home With:   OT  PT  HH  RN      x SNF/Inpatient Rehab/LTAC    Independent/assisted living    Hospice    Other:       PATIENT CONDITION AT DISCHARGE:     Functional status    Poor    x Deconditioned     Independent      Cognition    x Lucid     Forgetful     Dementia      Catheters/lines (plus indication)    West     PICC     PEG    x None      Code status    x Full code     DNR      PHYSICAL EXAMINATION AT DISCHARGE:        PHYSICAL EXAM:  General:          WD, WN. Alert, cooperative, no acute distress    EENT:              EOMI. Anicteric sclerae. MMM  Resp:               CTA bilaterally, no wheezing or rales. No accessory muscle use  CV:                  Grade III systolic murmur A2 area. GI:                   Soft, Non distended, Non tender. +Bowel sounds  Neurologic:      Alert and oriented X 3, normal speech,blind. Psych:             Good insight. Not anxious nor agitated  Skin:                No rashes.   No jaundice      CHRONIC MEDICAL DIAGNOSES:  Problem List as of 6/22/2019 Date Reviewed: 5/21/2019          Codes Class Noted - Resolved    [facility]KO ([facility]) [facility] ICD-10-CM: N17.9  ICD-9-CM: 584.9  6/19/2019 - Present        Severe anemia ICD-10-CM: D64.9  ICD-9-CM: 285.9  6/19/2019 - Present        Aortic stenosis ICD-10-CM: I35.0  ICD-9-CM: 424.1  6/17/2019 - Present        GIB (gastrointestinal bleeding) ICD-10-CM: K92.2  ICD-9-CM: 578.9  6/17/2019 - Present        Dehydration ICD-10-CM: E86.0  ICD-9-CM: 276.51  6/11/2019 - Present        Acute hypernatremia ICD-10-CM: E87.0  ICD-9-CM: 276.0  6/11/2019 - Present        Abnormal EKG ICD-10-CM: R94.31  ICD-9-CM: 794.31  6/11/2019 - Present        CKD (chronic kidney disease) stage 3, GFR 30-59 ml/min (HCC) ICD-10-CM: N18.3  ICD-9-CM: 585.3  2/7/2019 - Present        * (Principal) Anemia ICD-10-CM: D64.9  ICD-9-CM: 285.9  2/7/2019 - Present        Severe aortic stenosis ICD-10-CM: I35.0  ICD-9-CM: 424.1  12/21/2015 - Present        Fall ICD-10-CM: Jeffrey Siewesly. Claudeen Herd  ICD-9-CM: E888.9  12/21/2015 - Present        Rhabdomyolysis ICD-10-CM: M62.82  ICD-9-CM: 728.88  12/19/2015 - Present        Cellulitis and abscess of leg, except foot ICD-10-CM: L03.119, L02.419  ICD-9-CM: 682.6  2/2/2011 - Present        Diabetes ([facility] [address] ICD-10-CM: E11.9  ICD-9-CM: 250.00  2/2/2011 - Present        Essential hypertension, benign (Chronic) ICD-10-CM: I10  ICD-9-CM: 401.1  2/2/2011 - Present        Hip joint replacement (Chronic) ICD-9-CM: V43.64  2/2/2011 - Present        Morbidly obese (HCC) (Chronic) ICD-10-CM: E66.01  ICD-9-CM: 278.01  2/2/2011 - Present        RESOLVED: Elevated troponin I level ICD-10-CM: R74.8  ICD-9-CM: 790.6  12/19/2015 - 12/21/2015        RESOLVED: Encephalopathy ICD-10-CM: G93.40  ICD-9-CM: 348.30  12/19/2015 - 12/21/2015        RESOLVED: Leukocytosis ICD-10-CM: V20.580  ICD-9-CM: 288.60  12/19/2015 - 12/21/2015                      Signed:    Nicky Nevarez MD  6/22/2019  11:51 AM

## 2019-06-22 NOTE — PROGRESS NOTES
Patient for discharge today, IV removed. Called report to [facility]. No further questions at this time.

## 2019-06-24 ENCOUNTER — TELEPHONE (OUTPATIENT)
Dept: ONCOLOGY | Age: 84
End: 2019-06-24

## 2019-06-24 DIAGNOSIS — D64.9 ANEMIA, UNSPECIFIED TYPE: Primary | ICD-10-CM

## 2019-06-24 RX ORDER — SODIUM CHLORIDE 0.9 % (FLUSH) 0.9 %
10 SYRINGE (ML) INJECTION AS NEEDED
Status: CANCELLED
Start: 2019-07-11

## 2019-06-24 RX ORDER — ONDANSETRON 2 MG/ML
8 INJECTION INTRAMUSCULAR; INTRAVENOUS AS NEEDED
Status: CANCELLED | OUTPATIENT
Start: 2019-07-11

## 2019-06-24 RX ORDER — DIPHENHYDRAMINE HYDROCHLORIDE 50 MG/ML
50 INJECTION, SOLUTION INTRAMUSCULAR; INTRAVENOUS AS NEEDED
Status: CANCELLED
Start: 2019-07-11

## 2019-06-24 RX ORDER — SODIUM CHLORIDE 9 MG/ML
10 INJECTION INTRAMUSCULAR; INTRAVENOUS; SUBCUTANEOUS AS NEEDED
Status: CANCELLED | OUTPATIENT
Start: 2019-07-11

## 2019-06-24 RX ORDER — EPINEPHRINE 1 MG/ML
0.3 INJECTION, SOLUTION, CONCENTRATE INTRAVENOUS AS NEEDED
Status: CANCELLED | OUTPATIENT
Start: 2019-07-11

## 2019-06-24 RX ORDER — HYDROCORTISONE SODIUM SUCCINATE 100 MG/2ML
100 INJECTION, POWDER, FOR SOLUTION INTRAMUSCULAR; INTRAVENOUS AS NEEDED
Status: CANCELLED | OUTPATIENT
Start: 2019-07-11

## 2019-06-24 RX ORDER — ALBUTEROL SULFATE 0.83 MG/ML
2.5 SOLUTION RESPIRATORY (INHALATION) AS NEEDED
Status: CANCELLED
Start: 2019-07-11

## 2019-06-24 RX ORDER — HEPARIN 100 UNIT/ML
300-500 SYRINGE INTRAVENOUS AS NEEDED
Status: CANCELLED
Start: 2019-07-11

## 2019-06-24 RX ORDER — ACETAMINOPHEN 325 MG/1
650 TABLET ORAL AS NEEDED
Status: CANCELLED
Start: 2019-07-11

## 2019-06-24 NOTE — PROGRESS NOTES
Please call pt and let him know we are setting him up for 1 more iron infusion. He needs to come back to see Dr. Brianda Vazquez in 4 weeks with a CBC prior to visit. CBC ordered, please mail it to his house.

## 2019-06-24 NOTE — TELEPHONE ENCOUNTER
6/24/2019:    1204: called patient; no answer; left message  Will attempt to call patient later today     [phone]: called patient; no answer and left message   Lab orders placed in envelope and will be mailed out today

## 2019-07-16 ENCOUNTER — HOSPITAL ENCOUNTER (OUTPATIENT)
Dept: GENERAL RADIOLOGY | Age: 84
Discharge: HOME | End: 2019-07-16
Payer: MEDICARE

## 2019-07-16 DIAGNOSIS — I25.10 CORONARY ATHEROSCLEROSIS OF NATIVE CORONARY ARTERY: ICD-10-CM

## 2019-07-16 PROCEDURE — 71046 X-RAY EXAM CHEST 2 VIEWS: CPT

## 2019-07-17 DIAGNOSIS — D64.9 ANEMIA, UNSPECIFIED TYPE: ICD-10-CM

## 2019-07-29 ENCOUNTER — HOSPITAL ENCOUNTER (INPATIENT)
Age: 84
LOS: 3 days | Discharge: NURSING, REHAB OR LONG-TERM CARE | DRG: 287 | End: 2019-08-09
Attending: EMERGENCY MEDICINE | Admitting: FAMILY MEDICINE
Payer: MEDICARE

## 2019-07-29 ENCOUNTER — APPOINTMENT (OUTPATIENT)
Dept: GENERAL RADIOLOGY | Age: 84
DRG: 287 | End: 2019-07-29
Attending: EMERGENCY MEDICINE
Payer: MEDICARE

## 2019-07-29 DIAGNOSIS — R06.02 SHORTNESS OF BREATH: ICD-10-CM

## 2019-07-29 DIAGNOSIS — Z66 DO NOT RESUSCITATE: ICD-10-CM
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
## 2019-07-29 DIAGNOSIS — S80.02XA CONTUSION OF LEFT KNEE, INITIAL ENCOUNTER: ICD-10-CM

## 2019-07-29 DIAGNOSIS — R53.1 WEAKNESS: ICD-10-CM

## 2019-07-29 DIAGNOSIS — Z71.89 GOALS OF CARE, COUNSELING/DISCUSSION: ICD-10-CM

## 2019-07-29 DIAGNOSIS — R77.8 ELEVATED TROPONIN: ICD-10-CM

## 2019-07-29 DIAGNOSIS — Z71.89 ADVANCED CARE PLANNING/COUNSELING DISCUSSION: ICD-10-CM

## 2019-07-29 LAB
ALBUMIN SERPL-MCNC: 3.1 G/DL (ref 3.5–5)
ALBUMIN/GLOB SERPL: 0.7 {RATIO} (ref 1.1–2.2)
ALP SERPL-CCNC: 105 U/L (ref 45–117)
ALT SERPL-CCNC: 20 U/L (ref 12–78)
ANION GAP SERPL CALC-SCNC: 12 MMOL/L (ref 5–15)
AST SERPL-CCNC: 35 U/L (ref 15–37)
BASOPHILS # BLD: 0 K/UL (ref 0–0.1)
BASOPHILS NFR BLD: 0 % (ref 0–1)
BILIRUB SERPL-MCNC: 0.5 MG/DL (ref 0.2–1)
BUN SERPL-MCNC: 31 MG/DL (ref 6–20)
BUN/CREAT SERPL: 25 (ref 12–20)
CALCIUM SERPL-MCNC: 9.2 MG/DL (ref 8.5–10.1)
CHLORIDE SERPL-SCNC: 115 MMOL/L (ref 97–108)
CK MB CFR SERPL CALC: 3 % (ref 0–2.5)
CK MB SERPL-MCNC: 12.2 NG/ML (ref 5–25)
CK SERPL-CCNC: 405 U/L (ref 39–308)
CO2 SERPL-SCNC: 21 MMOL/L (ref 21–32)
COMMENT, HOLDF: NORMAL
CREAT SERPL-MCNC: 1.25 MG/DL (ref 0.7–1.3)
DIFFERENTIAL METHOD BLD: ABNORMAL
EOSINOPHIL # BLD: 0.1 K/UL (ref 0–0.4)
EOSINOPHIL NFR BLD: 1 % (ref 0–7)
ERYTHROCYTE [DISTWIDTH] IN BLOOD BY AUTOMATED COUNT: 16.5 % (ref 11.5–14.5)
GLOBULIN SER CALC-MCNC: 4.3 G/DL (ref 2–4)
GLUCOSE BLD STRIP.AUTO-MCNC: 116 MG/DL (ref 65–100)
GLUCOSE SERPL-MCNC: 102 MG/DL (ref 65–100)
HCT VFR BLD AUTO: 36.8 % (ref 36.6–50.3)
HGB BLD-MCNC: 11 G/DL (ref 12.1–17)
IMM GRANULOCYTES # BLD AUTO: 0 K/UL (ref 0–0.04)
IMM GRANULOCYTES NFR BLD AUTO: 0 % (ref 0–0.5)
LYMPHOCYTES # BLD: 0.9 K/UL (ref 0.8–3.5)
LYMPHOCYTES NFR BLD: 8 % (ref 12–49)
MAGNESIUM SERPL-MCNC: 2.2 MG/DL (ref 1.6–2.4)
MCH RBC QN AUTO: 28.6 PG (ref 26–34)
MCHC RBC AUTO-ENTMCNC: 29.9 G/DL (ref 30–36.5)
MCV RBC AUTO: 95.8 FL (ref 80–99)
MONOCYTES # BLD: 0.8 K/UL (ref 0–1)
MONOCYTES NFR BLD: 7 % (ref 5–13)
NEUTS SEG # BLD: 9 K/UL (ref 1.8–8)
NEUTS SEG NFR BLD: 84 % (ref 32–75)
NRBC # BLD: 0 K/UL (ref 0–0.01)
NRBC BLD-RTO: 0 PER 100 WBC
PLATELET # BLD AUTO: 342 K/UL (ref 150–400)
PMV BLD AUTO: 10.4 FL (ref 8.9–12.9)
POTASSIUM SERPL-SCNC: 3.9 MMOL/L (ref 3.5–5.1)
PROT SERPL-MCNC: 7.4 G/DL (ref 6.4–8.2)
RBC # BLD AUTO: 3.84 M/UL (ref 4.1–5.7)
SAMPLES BEING HELD,HOLD: NORMAL
SERVICE CMNT-IMP: ABNORMAL
SODIUM SERPL-SCNC: 148 MMOL/L (ref 136–145)
TROPONIN I SERPL-MCNC: 0.14 NG/ML
WBC # BLD AUTO: 10.9 K/UL (ref 4.1–11.1)

## 2019-07-29 PROCEDURE — 83735 ASSAY OF MAGNESIUM: CPT

## 2019-07-29 PROCEDURE — 82550 ASSAY OF CK (CPK): CPT

## 2019-07-29 PROCEDURE — 74011250637 HC RX REV CODE- 250/637: Performed by: EMERGENCY MEDICINE

## 2019-07-29 PROCEDURE — 80053 COMPREHEN METABOLIC PANEL: CPT

## 2019-07-29 PROCEDURE — 82553 CREATINE MB FRACTION: CPT

## 2019-07-29 PROCEDURE — 82962 GLUCOSE BLOOD TEST: CPT

## 2019-07-29 PROCEDURE — 96360 HYDRATION IV INFUSION INIT: CPT

## 2019-07-29 PROCEDURE — 93005 ELECTROCARDIOGRAM TRACING: CPT

## 2019-07-29 PROCEDURE — 36415 COLL VENOUS BLD VENIPUNCTURE: CPT

## 2019-07-29 PROCEDURE — 73562 X-RAY EXAM OF KNEE 3: CPT

## 2019-07-29 PROCEDURE — 96361 HYDRATE IV INFUSION ADD-ON: CPT

## 2019-07-29 PROCEDURE — 99218 HC RM OBSERVATION: CPT

## 2019-07-29 PROCEDURE — 99285 EMERGENCY DEPT VISIT HI MDM: CPT

## 2019-07-29 PROCEDURE — 85025 COMPLETE CBC W/AUTO DIFF WBC: CPT

## 2019-07-29 PROCEDURE — 74011250636 HC RX REV CODE- 250/636: Performed by: EMERGENCY MEDICINE

## 2019-07-29 PROCEDURE — 84484 ASSAY OF TROPONIN QUANT: CPT

## 2019-07-29 RX ORDER — SIMVASTATIN 20 MG/1
20 TABLET, FILM COATED ORAL
Status: CANCELLED | OUTPATIENT
Start: 2019-07-29

## 2019-07-29 RX ORDER — GUAIFENESIN 100 MG/5ML
81 LIQUID (ML) ORAL DAILY
Status: CANCELLED | OUTPATIENT
Start: 2019-07-30

## 2019-07-29 RX ORDER — DEXTROSE 50 % IN WATER (D50W) INTRAVENOUS SYRINGE
12.5-25 AS NEEDED
Status: DISCONTINUED | OUTPATIENT
Start: 2019-07-29 | End: 2019-08-09 | Stop reason: CLARIF

## 2019-07-29 RX ORDER — SODIUM CHLORIDE 0.9 % (FLUSH) 0.9 %
5-40 SYRINGE (ML) INJECTION AS NEEDED
Status: DISCONTINUED | OUTPATIENT
Start: 2019-07-29 | End: 2019-08-09 | Stop reason: HOSPADM

## 2019-07-29 RX ORDER — DEXTROSE MONOHYDRATE AND SODIUM CHLORIDE 5; .9 G/100ML; G/100ML
75 INJECTION, SOLUTION INTRAVENOUS CONTINUOUS
Status: CANCELLED | OUTPATIENT
Start: 2019-07-29

## 2019-07-29 RX ORDER — SODIUM CHLORIDE 0.9 % (FLUSH) 0.9 %
5-40 SYRINGE (ML) INJECTION EVERY 8 HOURS
Status: DISCONTINUED | OUTPATIENT
Start: 2019-07-29 | End: 2019-08-09 | Stop reason: HOSPADM

## 2019-07-29 RX ORDER — ACETAMINOPHEN 325 MG/1
650 TABLET ORAL
Status: DISCONTINUED | OUTPATIENT
Start: 2019-07-29 | End: 2019-08-09 | Stop reason: HOSPADM

## 2019-07-29 RX ORDER — INSULIN LISPRO 100 [IU]/ML
INJECTION, SOLUTION INTRAVENOUS; SUBCUTANEOUS
Status: DISCONTINUED | OUTPATIENT
Start: 2019-07-29 | End: 2019-08-09 | Stop reason: HOSPADM

## 2019-07-29 RX ORDER — ONDANSETRON 4 MG/1
4 TABLET, ORALLY DISINTEGRATING ORAL
Status: DISCONTINUED | OUTPATIENT
Start: 2019-07-29 | End: 2019-08-09 | Stop reason: HOSPADM

## 2019-07-29 RX ORDER — GUAIFENESIN 100 MG/5ML
162 LIQUID (ML) ORAL
Status: COMPLETED | OUTPATIENT
Start: 2019-07-29 | End: 2019-07-29

## 2019-07-29 RX ORDER — MAGNESIUM SULFATE 100 %
4 CRYSTALS MISCELLANEOUS AS NEEDED
Status: DISCONTINUED | OUTPATIENT
Start: 2019-07-29 | End: 2019-08-09 | Stop reason: HOSPADM

## 2019-07-29 RX ADMIN — ASPIRIN 81 MG 162 MG: 81 TABLET ORAL at 17:43

## 2019-07-29 RX ADMIN — SODIUM CHLORIDE 1000 ML: 900 INJECTION, SOLUTION INTRAVENOUS at 16:25

## 2019-07-29 RX ADMIN — Medication 10 ML: at 22:51

## 2019-07-29 NOTE — PROGRESS NOTES
Admission Medication Reconciliation:    Information obtained from:  [facility] [place]    Comments/Recommendations: Updated PTA meds/reviewed patient's allergies. Casi Santos [address] [facility] [place] @ [phone] to confirm medications and indications. She stated that patient was on medication regimen likely intended for someone else as patient's BP runs low, he does not have diabetes and no known reason for taking cholesterol medications. Note that RX Query shows recent refills of all these medications. Dr. Cathi Cazares updated on all points. Medication changes (since last review):  Removed  Antihypertensives: patient's normal blood pressure runs very low  Insulin: has not taken at home  Statin: never had elevated cholestrol or known medical reason for taking    Thank you for allowing me to participate in the care of your patient. Gricelda James PharmD, RN #[id number]                 Allergies:  Patient has no known allergies. Significant PMH/Disease States:   Past Medical History:   Diagnosis Date    Cataracts, bilateral     Chronic pain     Diabetes ([facility] [address] 2/2/2011    Heart failure ([facility] [address]     Hypertension        Chief Complaint for this Admission:    Chief Complaint   Patient presents with    Fall       Prior to Admission Medications:   Prior to Admission Medications   Prescriptions Last Dose Informant Patient Reported? Taking?   multivitamin (ONE A DAY) tablet 7/29/2019 at Unknown time  Yes Yes   Sig: Take 1 Tab by mouth daily. timolol (TIMOPTIC) 0.5 % ophthalmic solution 7/29/2019 at Unknown time  No Yes   Sig: Administer 1 Drop to left eye two (2) times a day.       Facility-Administered Medications: None

## 2019-07-29 NOTE — ROUTINE PROCESS
TRANSFER - OUT REPORT:    Verbal report given to Daniel Kirkland RN(name) on Nacho Bennett  being transferred to [facility](unit) for routine progression of care       Report consisted of patients Situation, Background, Assessment and   Recommendations(SBAR). Information from the following report(s) SBAR, Kardex, ED Summary, [facility] and Recent Results was reviewed with the receiving nurse. Lines:   Peripheral IV 07/29/19 Right Forearm (Active)        Opportunity for questions and clarification was provided.       Patient transported with:   [facility] [employer]

## 2019-07-29 NOTE — PROGRESS NOTES
TRANSFER - IN REPORT:    Verbal report received from Omar Barahona RN(name) on [facility]  being received from ED(unit) for routine progression of care      Report consisted of patients Situation, Background, Assessment and   Recommendations(SBAR). Information from the following report(s) SBAR, Kardex, ED Summary, MAR, Recent Results and Cardiac Rhythm SB/NSR was reviewed with the receiving nurse. Opportunity for questions and clarification was provided. Assessment completed upon patients arrival to unit and care assumed. Primary Nurse Parris Aleman and Aisha Bedoya RN performed a dual skin assessment on this patient. Impairment noted--bilateral lower leg discoloration/thickened skin, two skin tears on lateral L leg, sumit heels boggy. Bedside shift change report given to Roscoe Colvin RN (oncoming nurse) by Sohail Willoughby RN (offgoing nurse). Report included the following information SBAR, Kardex, Intake/Output, MAR, Recent Results and Cardiac Rhythm NSR/SB.

## 2019-07-29 NOTE — ED PROVIDER NOTES
80 y.o. male with past medical history significant for HTN, chronic pain, DM, bilateral cataracts, and heart failure who presents from home via EMS s/p GLF with chief complaint of knee pain. EMS reports pt suffered unwitnessed GLF at 0700 this morning and was found on the ground by his granddaughter at 1400. Pt states, \"I was walking to the bathroom and my L knee gave out on me; I couldn't get back up\". Since falling, pt has been c/o L knee pain. EMS reports BGL of \"68\" and GCS of \"15\" en route. EMS reports history of HTN, CHF, and DM. According to EMS, pt is noncompliant with medications. EMS also reports pt was incontinent of stool upon their arrival. Pt also notes recent loss of appetite. Pt denies any vomiting, difficulty urinating, chest pain or hip pain. There are no other acute medical concerns at this time. Old Chart Review:  Pt was last admitted to the hospital from 6/11/19 to 6/22/19 for anemia, aortic stenosis, pulmonary edema, and CKD stage III. Pt had 1 unit of pRBCs transfused and Hgb remained stable. EGD/colonoscopy was deferred due to severe aortic stenosis. Balloon arotic valvuloplasty was postponed due to kidney function worsening. Pt received IV lasix 20 mg on 6/21 and 6/22 for pulmonary edema. Pt was stable at time of discharge to skilled nursing facility and was recommended to f/u with cardiology. Social hx: Non smoker; No EtOH use    PCP: Marilyn Wilson MD    Note written by Joanne Unger, as dictated by Laurent Mesa MD 2:56 PM       The history is provided by the patient, the EMS personnel and medical records. No  was used.         Past Medical History:   Diagnosis Date    Cataracts, bilateral     Chronic pain     Diabetes ([address] 2/2/2011    Heart failure ([address]     Hypertension        Past Surgical History:   Procedure Laterality Date    HX HERNIA REPAIR      HX ORTHOPAEDIC      bilat hip replacement    HX PROSTATECTOMY           History reviewed. No pertinent family history. Social History     Socioeconomic History    Marital status:      Spouse name: Not on file    Number of children: Not on file    Years of education: Not on file    Highest education level: Not on file   Occupational History    Not on file   Social Needs    Financial resource strain: Not on file    Food insecurity:     Worry: Not on file     Inability: Not on file    Transportation needs:     Medical: Not on file     Non-medical: Not on file   Tobacco Use    Smoking status: Never Smoker    Smokeless tobacco: Never Used   Substance and Sexual Activity    Alcohol use: No    Drug use: No    Sexual activity: Not Currently   Lifestyle    Physical activity:     Days per week: Not on file     Minutes per session: Not on file    Stress: Not on file   Relationships    Social connections:     Talks on phone: Not on file     Gets together: Not on file     Attends Jewish service: Not on file     Active member of club or organization: Not on file     Attends meetings of clubs or organizations: Not on file     Relationship status: Not on file    Intimate partner violence:     Fear of current or ex partner: Not on file     Emotionally abused: Not on file     Physically abused: Not on file     Forced sexual activity: Not on file   Other Topics Concern    Not on file   Social History Narrative    Not on file         ALLERGIES: Patient has no known allergies. Review of Systems   Constitutional: Positive for appetite change. Negative for activity change and fever. Eyes: Negative for pain. Respiratory: Negative for cough and shortness of breath. Cardiovascular: Negative for chest pain and leg swelling. Gastrointestinal: Negative for abdominal pain and vomiting. Genitourinary: Negative for difficulty urinating, flank pain and hematuria. Musculoskeletal: Positive for arthralgias (L knee). Negative for gait problem, neck pain and neck stiffness.    Skin: Negative for color change. Neurological: Negative for speech difficulty and headaches. Hematological: Does not bruise/bleed easily. Psychiatric/Behavioral: Negative for confusion. All other systems reviewed and are negative. Vitals:    07/29/19 1454   BP: 167/60   Pulse: (!) 52   Resp: 18   Temp: 97.7 °F (36.5 °C)   SpO2: 98%            Physical Exam   Constitutional: He is oriented to person, place, and time. He appears well-developed and well-nourished. No distress. Pt is frail and elderly appearing. HENT:   Head: Normocephalic and atraumatic. Right Ear: External ear normal.   Left Ear: External ear normal.   Eyes: Pupils are equal, round, and reactive to light. EOM are normal.   Neck: Normal range of motion. Neck supple. No JVD present. No tracheal deviation present. Cardiovascular: Normal rate, regular rhythm, intact distal pulses and normal pulses. Exam reveals no gallop and no friction rub. Murmur heard. Systolic murmur is present with a grade of 2/6. Pt exhibits 2/6 systolic ejection murmur at left sternal border. Good pulses in all four extremities noted. Pulmonary/Chest: Effort normal and breath sounds normal. No stridor. No respiratory distress. He has no wheezes. He has no rales. Abdominal: Soft. Bowel sounds are normal. He exhibits no distension. There is no tenderness. There is no rebound and no guarding. Musculoskeletal: Normal range of motion. He exhibits no edema. Right knee: He exhibits normal range of motion. Left knee: He exhibits normal range of motion. Tenderness found. Pt exhibits mild tenderness to anterior L knee, but has good ROM of both knees. Neurological: He is alert and oriented to person, place, and time. He has normal reflexes. No cranial nerve deficit. Coordination normal.   Skin: Skin is warm and dry. No rash noted. He is not diaphoretic. No erythema. Psychiatric: He has a normal mood and affect.  His behavior is normal. Judgment and thought content normal.   Nursing note and vitals reviewed. Note written by Joanne Kaufman, as dictated by Bonnee Kocher, MD 2:56 PM     MDM  Number of Diagnoses or Management Options  Diagnosis management comments: 68-year-old -American male presents to the emergency department with fall. Patient hurt his left knee. He recently had an admission with anemia, renal insufficiency, dehydration. Will check x-rays of his left knee. Will check blood work and urinalysis. He has not a lot of complaints right now. Just mild pain in the left knee. Amount and/or Complexity of Data Reviewed  Clinical lab tests: ordered and reviewed  Tests in the radiology section of CPT®: ordered and reviewed  Tests in the medicine section of CPT®: ordered and reviewed  Decide to obtain previous medical records or to obtain history from someone other than the patient: yes  Obtain history from someone other than the patient: yes  Review and summarize past medical records: yes  Independent visualization of images, tracings, or specimens: yes    Risk of Complications, Morbidity, and/or Mortality  Presenting problems: high  Diagnostic procedures: high  Management options: high           Procedures  PROGRESS NOTE:  5:17 PM  Troponin resulted at 0.14  Will give ASA and admit    Pt has no chest pain  He had fall earlier and couldn't get up  Pt has complicated medical history    Hospitalist Billie for Admission  5:37 PM    ED Room Number: ER14/14  Patient Name and age:  Charly Griffin 80 y.o.  male  Working Diagnosis:   1. Fall, initial encounter    2. Weakness    3. Elevated troponin    4.  Contusion of left knee, initial encounter      Readmission: no  Isolation Requirements:  no  Recommended Level of Care:  telemetry  Code Status:  Full Code  Other:    Department:[facility] ED - [phone]

## 2019-07-29 NOTE — ED TRIAGE NOTES
Patient presents from home with complaints of GLF. Patient has pain in his L knee and states that his knee \"gives out\".   Patient has been on the fl;oor since 0700 this am

## 2019-07-30 ENCOUNTER — APPOINTMENT (OUTPATIENT)
Dept: NON INVASIVE DIAGNOSTICS | Age: 84
DRG: 287 | End: 2019-07-30
Attending: INTERNAL MEDICINE
Payer: MEDICARE

## 2019-07-30 LAB
ALBUMIN SERPL-MCNC: 2.7 G/DL (ref 3.5–5)
ALBUMIN/GLOB SERPL: 0.7 {RATIO} (ref 1.1–2.2)
ALP SERPL-CCNC: 100 U/L (ref 45–117)
ALT SERPL-CCNC: 17 U/L (ref 12–78)
ANION GAP SERPL CALC-SCNC: 6 MMOL/L (ref 5–15)
APPEARANCE UR: CLEAR
AST SERPL-CCNC: 26 U/L (ref 15–37)
ATRIAL RATE: 51 BPM
BACTERIA URNS QL MICRO: NEGATIVE /HPF
BILIRUB SERPL-MCNC: 0.4 MG/DL (ref 0.2–1)
BILIRUB UR QL CFM: NEGATIVE
BUN SERPL-MCNC: 31 MG/DL (ref 6–20)
BUN/CREAT SERPL: 27 (ref 12–20)
CALCIUM SERPL-MCNC: 8.8 MG/DL (ref 8.5–10.1)
CALCULATED P AXIS, ECG09: 59 DEGREES
CALCULATED R AXIS, ECG10: 32 DEGREES
CALCULATED T AXIS, ECG11: 74 DEGREES
CHLORIDE SERPL-SCNC: 119 MMOL/L (ref 97–108)
CK SERPL-CCNC: 283 U/L (ref 39–308)
CO2 SERPL-SCNC: 24 MMOL/L (ref 21–32)
COLOR UR: ABNORMAL
CREAT SERPL-MCNC: 1.16 MG/DL (ref 0.7–1.3)
DIAGNOSIS, 93000: NORMAL
ECHO AV AREA PEAK VELOCITY: 0.7 CM2
ECHO AV AREA VTI: 0.8 CM2
ECHO AV MEAN GRADIENT: 57.3 MMHG
ECHO AV PEAK GRADIENT: 100.5 MMHG
ECHO AV PEAK VELOCITY: 501.3 CM/S
ECHO AV VTI: 132.86 CM
ECHO LA MAJOR AXIS: 3.79 CM
ECHO LV INTERNAL DIMENSION DIASTOLIC: 3.14 CM (ref 4.2–5.9)
ECHO LV INTERNAL DIMENSION SYSTOLIC: 1.84 CM
ECHO LV IVSD: 1.2 CM (ref 0.6–1)
ECHO LV MASS 2D: 141.2 G (ref 88–224)
ECHO LV MASS INDEX 2D: 76 G/M2 (ref 49–115)
ECHO LV POSTERIOR WALL DIASTOLIC: 1.31 CM (ref 0.6–1)
ECHO LVOT DIAM: 1.98 CM
ECHO LVOT PEAK GRADIENT: 5.2 MMHG
ECHO LVOT PEAK VELOCITY: 113.76 CM/S
ECHO LVOT SV: 102.9 ML
ECHO LVOT VTI: 33.39 CM
ECHO RV INTERNAL DIMENSION: 3.41 CM
ECHO RV TAPSE: 2.27 CM (ref 1.5–2)
EPITH CASTS URNS QL MICRO: ABNORMAL /LPF
GLOBULIN SER CALC-MCNC: 3.8 G/DL (ref 2–4)
GLUCOSE BLD STRIP.AUTO-MCNC: 108 MG/DL (ref 65–100)
GLUCOSE BLD STRIP.AUTO-MCNC: 117 MG/DL (ref 65–100)
GLUCOSE BLD STRIP.AUTO-MCNC: 87 MG/DL (ref 65–100)
GLUCOSE BLD STRIP.AUTO-MCNC: 99 MG/DL (ref 65–100)
GLUCOSE SERPL-MCNC: 102 MG/DL (ref 65–100)
GLUCOSE UR STRIP.AUTO-MCNC: NEGATIVE MG/DL
HGB UR QL STRIP: NEGATIVE
HYALINE CASTS URNS QL MICRO: ABNORMAL /LPF (ref 0–5)
KETONES UR QL STRIP.AUTO: NEGATIVE MG/DL
LEUKOCYTE ESTERASE UR QL STRIP.AUTO: NEGATIVE
NITRITE UR QL STRIP.AUTO: NEGATIVE
P-R INTERVAL, ECG05: 130 MS
PH UR STRIP: 5.5 [PH] (ref 5–8)
POTASSIUM SERPL-SCNC: 3.6 MMOL/L (ref 3.5–5.1)
PROT SERPL-MCNC: 6.5 G/DL (ref 6.4–8.2)
PROT UR STRIP-MCNC: 30 MG/DL
Q-T INTERVAL, ECG07: 516 MS
QRS DURATION, ECG06: 82 MS
QTC CALCULATION (BEZET), ECG08: 475 MS
RBC #/AREA URNS HPF: ABNORMAL /HPF (ref 0–5)
SERVICE CMNT-IMP: ABNORMAL
SERVICE CMNT-IMP: ABNORMAL
SERVICE CMNT-IMP: NORMAL
SERVICE CMNT-IMP: NORMAL
SODIUM SERPL-SCNC: 149 MMOL/L (ref 136–145)
SP GR UR REFRACTOMETRY: 1.02 (ref 1–1.03)
TROPONIN I SERPL-MCNC: 0.14 NG/ML
TROPONIN I SERPL-MCNC: 0.14 NG/ML
UR CULT HOLD, URHOLD: NORMAL
UROBILINOGEN UR QL STRIP.AUTO: 1 EU/DL (ref 0.2–1)
VENTRICULAR RATE, ECG03: 51 BPM
WBC URNS QL MICRO: ABNORMAL /HPF (ref 0–4)

## 2019-07-30 PROCEDURE — 74011250636 HC RX REV CODE- 250/636: Performed by: INTERNAL MEDICINE

## 2019-07-30 PROCEDURE — 74011000250 HC RX REV CODE- 250: Performed by: INTERNAL MEDICINE

## 2019-07-30 PROCEDURE — 80053 COMPREHEN METABOLIC PANEL: CPT

## 2019-07-30 PROCEDURE — 84484 ASSAY OF TROPONIN QUANT: CPT

## 2019-07-30 PROCEDURE — 77010033678 HC OXYGEN DAILY

## 2019-07-30 PROCEDURE — 94760 N-INVAS EAR/PLS OXIMETRY 1: CPT

## 2019-07-30 PROCEDURE — 36415 COLL VENOUS BLD VENIPUNCTURE: CPT

## 2019-07-30 PROCEDURE — 81001 URINALYSIS AUTO W/SCOPE: CPT

## 2019-07-30 PROCEDURE — 99218 HC RM OBSERVATION: CPT

## 2019-07-30 PROCEDURE — 82962 GLUCOSE BLOOD TEST: CPT

## 2019-07-30 PROCEDURE — 74011000250 HC RX REV CODE- 250: Performed by: FAMILY MEDICINE

## 2019-07-30 PROCEDURE — 77030011256 HC DRSG MEPILEX <16IN NO BORD MOLN -A

## 2019-07-30 PROCEDURE — 93306 TTE W/DOPPLER COMPLETE: CPT

## 2019-07-30 PROCEDURE — 74011250637 HC RX REV CODE- 250/637: Performed by: FAMILY MEDICINE

## 2019-07-30 RX ORDER — THERA TABS 400 MCG
1 TAB ORAL DAILY
Status: DISCONTINUED | OUTPATIENT
Start: 2019-07-30 | End: 2019-08-09 | Stop reason: HOSPADM

## 2019-07-30 RX ORDER — TIMOLOL MALEATE 5 MG/ML
1 SOLUTION/ DROPS OPHTHALMIC 2 TIMES DAILY
Status: DISCONTINUED | OUTPATIENT
Start: 2019-07-30 | End: 2019-08-09 | Stop reason: HOSPADM

## 2019-07-30 RX ORDER — ACETYLCYSTEINE 200 MG/ML
600 SOLUTION ORAL; RESPIRATORY (INHALATION) 2 TIMES DAILY
Status: COMPLETED | OUTPATIENT
Start: 2019-07-30 | End: 2019-07-31

## 2019-07-30 RX ORDER — SODIUM CHLORIDE 9 MG/ML
50 INJECTION, SOLUTION INTRAVENOUS CONTINUOUS
Status: DISCONTINUED | OUTPATIENT
Start: 2019-07-30 | End: 2019-07-31

## 2019-07-30 RX ADMIN — TIMOLOL MALEATE 1 DROP: 5 SOLUTION OPHTHALMIC at 12:52

## 2019-07-30 RX ADMIN — Medication 10 ML: at 13:47

## 2019-07-30 RX ADMIN — TIMOLOL MALEATE 1 DROP: 5 SOLUTION OPHTHALMIC at 17:47

## 2019-07-30 RX ADMIN — ACETYLCYSTEINE 600 MG: 200 SOLUTION ORAL; RESPIRATORY (INHALATION) at 21:24

## 2019-07-30 RX ADMIN — THERA TABS 1 TABLET: TAB at 12:52

## 2019-07-30 RX ADMIN — SODIUM CHLORIDE 50 ML/HR: 900 INJECTION, SOLUTION INTRAVENOUS at 21:23

## 2019-07-30 RX ADMIN — Medication 10 ML: at 06:58

## 2019-07-30 RX ADMIN — Medication 10 ML: at 21:27

## 2019-07-30 NOTE — H&P
[address]   HISTORY AND PHYSICAL    Name:  Randal Connelly  MR#:  [id number]  :  1929  ACCOUNT #:  [de-identified]  ADMIT DATE:  2019      PCP:  Renan Umana MD    REASON FOR ADMISSION:  Fall. HISTORY OF PRESENT ILLNESS:  This is an 80year-old with past medical history of chronic pain, diabetes, bilateral cataracts, heart failure, and aortic stenosis, presenting to the hospital because of unwitnessed fall. Since 7 a.m. in the morning, he had a fall and was lying on the floor and later on at 2 p.m., his granddaughter noticed that, called the EMS where his blood sugar was 68 though he was awake, and that was the reason he was brought to the hospital.  According to him, his knee was hurting and it gave away and that was his reason for fall. He does have a history of arthritis for the same. Of note, he was recently admitted in 2019 for aortic stenosis, pulmonary edema, CKD stage III, had blood transfused. EGD and colonoscopy could not be done because he has severe aortic stenosis and balloon aortic valvuloplasty was also not done because his kidney function is getting worse. He was sent to the rehabilitation from there, but then he went home just for a weekend, fell and came back to the hospital.    PAST MEDICAL HISTORY:  Bilateral cataracts, chronic pain, diabetes, aortic stenosis, heart failure, hypertension. PAST SURGICAL HISTORY:  History of bilateral hip replacement, prostatectomy. ALLERGIES:  NO KNOWN DRUG ALLERGIES. SOCIAL HISTORY:  He is . Has a granddaughter. REVIEW OF SYSTEMS:  Positive for appetite change. He is having pain in his left knee. Otherwise, no speech difficulties, no confusion, no headaches, no chest pain, no leg pain, no abdominal pain, vomiting, no cough or shortness of breath. MEDICATIONS:  Prior to admission medication list is as follows:  1. He is only on timolol 0.5% one drop twice daily. 2.  Multivitamin.     I do not have more than this prior to admission medication list as of now. PHYSICAL EXAMINATION:  GENERAL:  He is sitting in the bed. He is pale and elderly appearing. VITAL SIGNS:  Blood pressure 116/60, pulse rate is 52 and sinus and saturating 98% on room air. HEENT:  Normocephalic, atraumatic. Pupils equally reactive to light and accommodation. NECK:  Supple. CHEST:  Bilateral air entry equal.  CARDIOVASCULAR:  S1, S2 normal with no rubs or gallops noted. He has a murmur. ABDOMEN:  Soft. EXTREMITIES:  He has bilateral knee swelling 2+, swollen left and right knee, tender. NEUROLOGIC:  Alert and oriented to time, place, and person. SKIN:  Warm and dry. PSYCH:  Normal mood and affect. LABS AND IMAGING:  WBC 10.9, hemoglobin of 11, platelets 598. Sodium 148, potassium is 3.9, creatinine 1.24, and his troponin is 0.14. His CK is 405. EKG is normal sinus rhythm, nonspecific ST-T wave changes. Knee x-ray does not show any kind of fractures. ASSESSMENT AND PLAN:  This is an 27-year-old who presented to the hospital because of a fall. We will admit the patient to the hospital under observation for dehydration and mildly elevated CK and troponin of 0.14.  1.  Fall. The patient admitted under observation. We will ask Physical Therapy/Occupational Therapy to see. He was in the Rehabilitation all this time and just went to home to have fallen. Per one of the friends in the room, he has fallen 3 times in the last 1 week. He might need long-term care at this point in time, he has only a granddaughter to take care of him. 2.  Chronic pain issues. The pain in his knee, he has severe degenerative knees. He would benefit from Orthopedic consult outpatient. 3.  Troponin of 0.16. I doubt if there is any cardiac event going on as this part of elevation. The CK is also elevated. We will start him on IV fluids. Troponin will be trended 3 times a week. We will still get the echo done.   If needed, Cardiology consult. 4.  History of severe aortic stenosis, balloon aortic valvuloplasty postponed in the past.  Kidney function might be better. Cardiology can be called to see, we need to do that in this admission and/or he can follow up outpatient and hold the Lasix for now as he looks dehydrated to me. 5.  Chronic kidney disease stage III. Kidney function is getting better. 6.  Anemia. Esophagogastroduodenoscopy and colonoscopy not done last time. Felt it was high risk. His hemoglobin is much better at 11 at this time. 7.  Deep venous thrombosis prophylaxis with sequential compression devices. On behalf of hospitalist, thank you for letting us participate in the care of this patient.         Alma Duran MD      [id number]  D:  07/29/2019 18:48  T:  07/29/2019 21:44  JOB #:  [id number]

## 2019-07-30 NOTE — PROGRESS NOTES
1530: Bedside shift change report given to MileRN and Ricardo Malone RN (oncoming nurse) by Hilda Sigala RN (offgoing nurse). Report included the following information SBAR, Kardex, Intake/Output, MAR, Recent Results and Cardiac Rhythm SB/NSR. Problem: Falls - Risk of  Goal: *Absence of Falls  Description  Document Deana Scott Fall Risk and appropriate interventions in the flowsheet. Outcome: Progressing Towards Goal  Note:   Fall Risk Interventions:  Mobility Interventions: Communicate number of staff needed for ambulation/transfer, OT consult for ADLs, Patient to call before getting OOB, PT Consult for mobility concerns, PT Consult for assist device competence, Strengthening exercises (ROM-active/passive), Utilize walker, cane, or other assistive device         Medication Interventions: Patient to call before getting OOB    Elimination Interventions: Call light in reach, Patient to call for help with toileting needs, Stay With Me (per policy), Toilet paper/wipes in reach, Toileting schedule/hourly rounds, Urinal in reach    History of Falls Interventions: Consult care management for discharge planning, Door open when patient unattended, Evaluate medications/consider consulting pharmacy, Investigate reason for fall, Room close to nurse's station, Utilize gait belt for transfer/ambulation         Problem: Heart Failure: Day 1  Goal: Activity/Safety  Outcome: Progressing Towards Goal  Note:   PT/OT consulted today  Goal: Consults, if ordered  Outcome: Progressing Towards Goal  Note:   Cardiology following. Goal: Diagnostic Test/Procedures  Outcome: Progressing Towards Goal  Note:   Echo completed today. Troponins neither elevated nor decreased. Goal: Nutrition/Diet  Outcome: Progressing Towards Goal  Note:   Cardiac regular diet   Goal: Respiratory  Outcome: Progressing Towards Goal  Note:   Maintaining SpO2 >90% on room air.  No complaints of SOB

## 2019-07-30 NOTE — PROGRESS NOTES
Problem: Falls - Risk of  Goal: *Absence of Falls  Description  Document Levy Rm Fall Risk and appropriate interventions in the flowsheet. Outcome: Progressing Towards Goal  Note:   Fall Risk Interventions:  Mobility Interventions: Communicate number of staff needed for ambulation/transfer, Patient to call before getting OOB     Medication Interventions: Patient to call before getting OOB    Elimination Interventions: Call light in reach, Toileting schedule/hourly rounds, Urinal in reach, Patient to call for help with toileting needs    History of Falls Interventions: Door open when patient unattended      Bedside shift change report given to Asha Russell RN (oncoming nurse) by Arlen Pope RN (offgoing nurse). Report included the following information SBAR, Kardex, Intake/Output, MAR, Recent Results and Cardiac Rhythm Sinus errol/NSR.

## 2019-07-30 NOTE — PROGRESS NOTES
Interdisciplinary team rounds were held 7/30/2019 with the following team members:Care Management, Nursing, Nutrition, Occupational Therapy, Pharmacy and Clinical Coordinator    Plan of care discussed. See clinical pathway and/or care plan for interventions and desired outcomes. Nursing stating fall, down ~12 hours, and now difficult to mobilize in the room. Recommend PT and OT consult. Thank you.

## 2019-07-30 NOTE — PROGRESS NOTES
Reason for Admission:   Patient presented to the ED after fall at home. RRAT Score:   28-High-Red               Resources/supports as identified by patient/family:   Patient has a supportive granddaughter who provides support in addition to private duty three times a week for four hours a day who assists with bathing, meal preparation and cleaning. Top Challenges facing patient (as identified by patient/family and CM): Finances/Medication cost?   Patient verified insurance info, preferred pharmacy is [facility].                 Transportation? Patient relies on family for transportation needs. Support system or lack thereof? Family and private duty care. Living arrangements? Patient lives alone. Self-care/ADLs/Cognition? Patient is alert and oriented. Patient uses a rollator and requires assistance with ADLs. Current Advanced Directive/Advance Care Plan:  There is no AMD on file and patient is a FULL code. Plan for utilizing home health:   Patient may already be receiving home health nursing; CM to identify agency. Transition of Care Plan:  TBD-pending PT/OT eval. CM to monitor and assist with transitional care planning.      Kiley Alanis MS

## 2019-07-30 NOTE — WOUND CARE
WOCN Note:     New consult placed for assessment of left low leg. Chart reviewed. Admitted DX:  Fall  Past Medical History: DM, HF, cataracts, aortic stenosis    Assessment:   Patient is A&O x 3, communicative and requires assist with repositioning. Bed: total care  Patient reports no pain. Patient repositioned on right side with pillow. Heels offloaded on pillow. Sacrum, heels and buttocks intact without erythema. 1. POA, Left low lateral leg, cluster of 2 partial thickness wounds: each ~ 1 x 1 x 0.1 cm; 100% red; no exudate or order; surrounded by hypopigmented pink. Recommendations:    Left low leg:  Daily cleanse with saline; apply Xeroform; cover with dry dressing. Skin Care & Pressure Prevention:  Minimize layers of linen/pads under patient to optimize support surface. Turn/reposition approximately every 2 hours and offload heels. Manage incontinence / promote continence    Discussed above plan with patient and Elkin Richards RN.     Transition of Care: Plan to follow as needed while admitted to hospital.    MAKAYLA Moncada  Og Guerrero [phone]  Pager [phone]

## 2019-07-30 NOTE — PROGRESS NOTES
Hospitalist Progress Note          Lion Tucker MD  Please call  and page for questions. Call physician on-call through the  7pm-7am    Daily Progress Note: 7/30/2019    Primary care provider:Lilliana Castillo MD    Date of admission: 7/29/2019  2:47 PM    Admission summery and hospital course:  63-year-old with past medical history of chronic pain, diabetes, bilateral cataracts, heart failure, and aortic stenosis, presenting to the hospital because of unwitnessed fall. Since 7 a.m. in the morning, he had a fall and was lying on the floor and later on at 2 p.m., his granddaughter noticed that, called the EMS where his blood sugar was 68 though he was awake, and that was the reason he was brought to the hospital.  According to him, his knee was hurting and it gave away and that was his reason for fall. He does have a history of arthritis for the same. Of note, he was recently admitted in 06/2019 for aortic stenosis, pulmonary edema, CKD stage III, had blood transfused. EGD and colonoscopy could not be done because he has severe aortic stenosis and balloon aortic valvuloplasty was also not done because his kidney function is getting worse. He was sent to the rehabilitation from there, but then he went home just for a weekend, fell and came back to the hospital.     Subjective:   Patient said he is feeling better today. He does not have any issue other than he has not been able to get out of the bed due to weakness. Assessment/Plan:   Fall. Recurrent. Probably due to age related weakness and medical issue including anemia, bradycardia and aortic stenosis. Await cardiology input, Physical Therapy/Occupational Therapy. Continue telemetry for now. He has fallen 3 times in the last 1 week at home. He went to home from rehab on 07/22. He might need long-term care at this point in time, only a granddaughter to take care of him. Hypoglycemia, POA.  Blood glucose is reasonable now. Continue to monitor. Troponin of 0.14. Troponin is stabilized. patient has no CP, SOB and palpitation. Will check THS for bradycardia. EKG without significant finding. History of severe aortic stenosis, balloon aortic valvuloplasty postponed in the past.  Cardiology consult. Chronic kidney disease stage III. Kidney function is improving. Anemia. Esophagogastroduodenoscopy and colonoscopy not done last time due to high risk  His hemoglobin is much better at 11 at this time. See orders for other plans. VTE prophylaxis: SCD  Code status: Full  Discussed plan of care with Patient/Family and Nurse. Pre-admission lived at home. Discharge planning: pending. Review of Systems:     Review of Systems:  Symptom  Y/N  Comments   Symptom  Y/N  Comments    Fever/Chills  n    Chest Pain  n    Poor Appetite  n    Edema  n     Cough  n   Abdominal Pain  n     Sputum  n   Joint Pain  n    SOB/JOSUE  n   Pruritis/Rash      Nausea/vomit  n   Tolerating PT/OT      Diarrhea     Tolerating Diet      Constipation     Other      Could not obtain due to:         Objective:   Physical Exam:     Visit Vitals  /70 (BP 1 Location: Right arm, BP Patient Position: At rest)   Pulse 66   Temp 97.6 °F (36.4 °C)   Resp 18   Wt 75.4 kg (166 lb 3.6 oz)   SpO2 92%   BMI 26.03 kg/m²      O2 Device: Room air    Temp (24hrs), Av.6 °F (36.4 °C), Min:97.4 °F (36.3 °C), Max:97.7 °F (36.5 °C)    No intake/output data recorded. No intake/output data recorded. General:  Alert, cooperative, no distress, appears stated age. Lungs:   Clear to auscultation bilaterally. Chest wall:  No tenderness or deformity. Heart:  Regular rate and rhythm, S1, S2 normal, Systolic murmur present. Abdomen:   Soft, non-tender. Bowel sounds normal.    Extremities: Extremities normal, atraumatic, no cyanosis or edema. Pulses: 2+ and symmetric all extremities.    Skin: Skin color, texture, turgor normal. No rashes or lesions   Neurologic: Patient is talking and communicating appropriately. Patient follows the command well. Patient is moving all his extremities. Data Review:       Recent Days:  Recent Labs     07/29/19  1619   WBC 10.9   HGB 11.0*   HCT 36.8        Recent Labs     07/30/19  0503 07/29/19  1619   * 148*   K 3.6 3.9   * 115*   CO2 24 21   * 102*   BUN 31* 31*   CREA 1.16 1.25   CA 8.8 9.2   MG  --  2.2   ALB 2.7* 3.1*   SGOT 26 35   ALT 17 20     No results for input(s): PH, PCO2, PO2, HCO3, FIO2 in the last 72 hours. 24 Hour Results:  Recent Results (from the past 24 hour(s))   EKG, 12 LEAD, INITIAL    Collection Time: 07/29/19  3:04 PM   Result Value Ref Range    Ventricular Rate 51 BPM    Atrial Rate 51 BPM    P-R Interval 130 ms    QRS Duration 82 ms    Q-T Interval 516 ms    QTC Calculation (Bezet) 475 ms    Calculated P Axis 59 degrees    Calculated R Axis 32 degrees    Calculated T Axis 74 degrees    Diagnosis       Sinus bradycardia  Nonspecific T wave abnormality  When compared with ECG of 12-JUN-2019 06:03,  No significant change  Confirmed by Merry Estrada MD. ([id number]) on 7/30/2019 8:51:46 AM     CBC WITH AUTOMATED DIFF    Collection Time: 07/29/19  4:19 PM   Result Value Ref Range    WBC 10.9 4.1 - 11.1 K/uL    RBC 3.84 (L) 4.10 - 5.70 M/uL    HGB 11.0 (L) 12.1 - 17.0 g/dL    HCT 36.8 36.6 - 50.3 %    MCV 95.8 80.0 - 99.0 FL    MCH 28.6 26.0 - 34.0 PG    MCHC 29.9 (L) 30.0 - 36.5 g/dL    RDW 16.5 (H) 11.5 - 14.5 %    PLATELET 854 219 - 130 K/uL    MPV 10.4 8.9 - 12.9 FL    NRBC 0.0 0  WBC    ABSOLUTE NRBC 0.00 0.00 - 0.01 K/uL    NEUTROPHILS 84 (H) 32 - 75 %    LYMPHOCYTES 8 (L) 12 - 49 %    MONOCYTES 7 5 - 13 %    EOSINOPHILS 1 0 - 7 %    BASOPHILS 0 0 - 1 %    IMMATURE GRANULOCYTES 0 0.0 - 0.5 %    ABS. NEUTROPHILS 9.0 (H) 1.8 - 8.0 K/UL    ABS. LYMPHOCYTES 0.9 0.8 - 3.5 K/UL    ABS. MONOCYTES 0.8 0.0 - 1.0 K/UL    ABS.  EOSINOPHILS 0.1 0.0 - 0.4 K/UL    ABS. BASOPHILS 0.0 0.0 - 0.1 K/UL    ABS. IMM. GRANS. 0.0 0.00 - 0.04 K/UL    DF AUTOMATED     METABOLIC PANEL, COMPREHENSIVE    Collection Time: 07/29/19  4:19 PM   Result Value Ref Range    Sodium 148 (H) 136 - 145 mmol/L    Potassium 3.9 3.5 - 5.1 mmol/L    Chloride 115 (H) 97 - 108 mmol/L    CO2 21 21 - 32 mmol/L    Anion gap 12 5 - 15 mmol/L    Glucose 102 (H) 65 - 100 mg/dL    BUN 31 (H) 6 - 20 MG/DL    Creatinine 1.25 0.70 - 1.30 MG/DL    BUN/Creatinine ratio 25 (H) 12 - 20      GFR est AA >60 >60 ml/min/1.73m2    GFR est non-AA 54 (L) >60 ml/min/1.73m2    Calcium 9.2 8.5 - 10.1 MG/DL    Bilirubin, total 0.5 0.2 - 1.0 MG/DL    ALT (SGPT) 20 12 - 78 U/L    AST (SGOT) 35 15 - 37 U/L    Alk. phosphatase 105 45 - 117 U/L    Protein, total 7.4 6.4 - 8.2 g/dL    Albumin 3.1 (L) 3.5 - 5.0 g/dL    Globulin 4.3 (H) 2.0 - 4.0 g/dL    A-G Ratio 0.7 (L) 1.1 - 2.2     SAMPLES BEING HELD    Collection Time: 07/29/19  4:19 PM   Result Value Ref Range    SAMPLES BEING HELD 1red,1blue     COMMENT        Add-on orders for these samples will be processed based on acceptable specimen integrity and analyte stability, which may vary by analyte. MAGNESIUM    Collection Time: 07/29/19  4:19 PM   Result Value Ref Range    Magnesium 2.2 1.6 - 2.4 mg/dL   CK W/ REFLX CKMB    Collection Time: 07/29/19  4:19 PM   Result Value Ref Range     (H) 39 - 308 U/L   TROPONIN I    Collection Time: 07/29/19  4:19 PM   Result Value Ref Range    Troponin-I, Qt. 0.14 (H) <0.05 ng/mL   CK-MB,QUANT.     Collection Time: 07/29/19  4:19 PM   Result Value Ref Range    CK - MB 12.2 (H) <3.6 NG/ML    CK-MB Index 3.0 (H) 0.0 - 2.5     GLUCOSE, POC    Collection Time: 07/29/19 10:29 PM   Result Value Ref Range    Glucose (POC) 116 (H) 65 - 100 mg/dL    Performed by Kevin Ratliff    CK    Collection Time: 07/29/19 11:09 PM   Result Value Ref Range     39 - 308 U/L   TROPONIN I    Collection Time: 07/29/19 11:09 PM   Result Value Ref Range    Troponin-I, Qt. 0.14 (H) <5.06 ng/mL   METABOLIC PANEL, COMPREHENSIVE    Collection Time: 07/30/19  5:03 AM   Result Value Ref Range    Sodium 149 (H) 136 - 145 mmol/L    Potassium 3.6 3.5 - 5.1 mmol/L    Chloride 119 (H) 97 - 108 mmol/L    CO2 24 21 - 32 mmol/L    Anion gap 6 5 - 15 mmol/L    Glucose 102 (H) 65 - 100 mg/dL    BUN 31 (H) 6 - 20 MG/DL    Creatinine 1.16 0.70 - 1.30 MG/DL    BUN/Creatinine ratio 27 (H) 12 - 20      GFR est AA >60 >60 ml/min/1.73m2    GFR est non-AA 59 (L) >60 ml/min/1.73m2    Calcium 8.8 8.5 - 10.1 MG/DL    Bilirubin, total 0.4 0.2 - 1.0 MG/DL    ALT (SGPT) 17 12 - 78 U/L    AST (SGOT) 26 15 - 37 U/L    Alk. phosphatase 100 45 - 117 U/L    Protein, total 6.5 6.4 - 8.2 g/dL    Albumin 2.7 (L) 3.5 - 5.0 g/dL    Globulin 3.8 2.0 - 4.0 g/dL    A-G Ratio 0.7 (L) 1.1 - 2.2     TROPONIN I    Collection Time: 07/30/19  5:03 AM   Result Value Ref Range    Troponin-I, Qt. 0.14 (H) <0.05 ng/mL   URINALYSIS W/MICROSCOPIC    Collection Time: 07/30/19  5:10 AM   Result Value Ref Range    Color YELLOW/STRAW      Appearance CLEAR CLEAR      Specific gravity 1.019 1.003 - 1.030      pH (UA) 5.5 5.0 - 8.0      Protein 30 (A) NEG mg/dL    Glucose NEGATIVE  NEG mg/dL    Ketone NEGATIVE  NEG mg/dL    Blood NEGATIVE  NEG      Urobilinogen 1.0 0.2 - 1.0 EU/dL    Nitrites NEGATIVE  NEG      Leukocyte Esterase NEGATIVE  NEG      WBC 0-4 0 - 4 /hpf    RBC 0-5 0 - 5 /hpf    Epithelial cells FEW FEW /lpf    Bacteria NEGATIVE  NEG /hpf    Hyaline cast 2-5 0 - 5 /lpf   URINE CULTURE HOLD SAMPLE    Collection Time: 07/30/19  5:10 AM   Result Value Ref Range    Urine culture hold        URINE ON HOLD IN MICROBIOLOGY DEPT FOR 3 DAYS. IF UNPRESERVED URINE IS SUBMITTED, IT CANNOT BE USED FOR ADDITIONAL TESTING AFTER 24 HRS, RECOLLECTION WILL BE REQUIRED.    BILIRUBIN, CONFIRM    Collection Time: 07/30/19  5:10 AM   Result Value Ref Range    Bilirubin UA, confirm NEGATIVE  NEG     GLUCOSE, POC    Collection Time: 07/30/19  6:39 AM   Result Value Ref Range    Glucose (POC) 87 65 - 100 mg/dL    Performed by Tyrell Bermudez        Problem List:  Problem List as of 7/30/2019 Date Reviewed: 5/21/2019          Codes Class Noted - Resolved    KEIKO (acute kidney injury) ([facility] ICD-10-CM: N17.9  ICD-9-CM: 584.9  6/19/2019 - Present        Severe anemia ICD-10-CM: D64.9  ICD-9-CM: 285.9  6/19/2019 - Present        Aortic stenosis ICD-10-CM: I35.0  ICD-9-CM: 424.1  6/17/2019 - Present        GIB (gastrointestinal bleeding) ICD-10-CM: K92.2  ICD-9-CM: 578.9  6/17/2019 - Present        Dehydration ICD-10-CM: E86.0  ICD-9-CM: 276.51  6/11/2019 - Present        Acute hypernatremia ICD-10-CM: E87.0  ICD-9-CM: 276.0  6/11/2019 - Present        Abnormal EKG ICD-10-CM: R94.31  ICD-9-CM: 794.31  6/11/2019 - Present        CKD (chronic kidney disease) stage 3, GFR 30-59 ml/min ([facility]) ICD-10-CM: N18.3  ICD-9-CM: 585.3  2/7/2019 - Present        Anemia ICD-10-CM: D64.9  ICD-9-CM: 285.9  2/7/2019 - Present        Severe aortic stenosis ICD-10-CM: I35.0  ICD-9-CM: 424.1  12/21/2015 - Present        Fall ICD-10-CM: [place] [address] [place]  ICD-9-CM: E888.9  12/21/2015 - Present        Rhabdomyolysis ICD-10-CM: M62.82  ICD-9-CM: 728.88  12/19/2015 - Present        Cellulitis and abscess of leg, except foot ICD-10-CM: L03.119, L02.419  ICD-9-CM: 682.6  2/2/2011 - Present        Diabetes ([facility] ICD-10-CM: E11.9  ICD-9-CM: 250.00  2/2/2011 - Present        Essential hypertension, benign (Chronic) ICD-10-CM: I10  ICD-9-CM: 401.1  2/2/2011 - Present        Hip joint replacement (Chronic) ICD-9-CM: V43.64  2/2/2011 - Present        Morbidly obese (HCC) (Chronic) ICD-10-CM: E66.01  ICD-9-CM: 278.01  2/2/2011 - Present        RESOLVED: Elevated troponin I level ICD-10-CM: R74.8  ICD-9-CM: 790.6  12/19/2015 - 12/21/2015        RESOLVED: Encephalopathy ICD-10-CM: G93.40  ICD-9-CM: 348.30  12/19/2015 - 12/21/2015        RESOLVED: Leukocytosis ICD-10-CM: P68.933  ICD-9-CM: 288.60  12/19/2015 - 12/21/2015              Medications reviewed  Current Facility-Administered Medications   Medication Dose Route Frequency    sodium chloride (NS) flush 5-40 mL  5-40 mL IntraVENous Q8H    sodium chloride (NS) flush 5-40 mL  5-40 mL IntraVENous PRN    acetaminophen (TYLENOL) tablet 650 mg  650 mg Oral Q4H PRN    ondansetron (ZOFRAN ODT) tablet 4 mg  4 mg Oral Q4H PRN    insulin lispro (HUMALOG) injection   SubCUTAneous AC&HS    glucose chewable tablet 16 g  4 Tab Oral PRN    dextrose (D50W) injection syrg 12.5-25 g  12.5-25 g IntraVENous PRN    glucagon (GLUCAGEN) injection 1 mg  1 mg IntraMUSCular PRN       Care Plan discussed with: Patient/Family and Nurse    Total time spent with patient: 30 minutes.     Cyndee Leung MD

## 2019-07-30 NOTE — PROGRESS NOTES
1930- Bedside and Verbal shift change report given to Ubaldo Christianson RN (oncoming nurse) by Buster Rosario RN (offgoing nurse). Report included the following information SBAR, Kardex, Procedure Summary, Intake/Output, MAR, Accordion and Recent Results. Pt encouraged to increase time out of bed.    Problem: Heart Failure: Day 1  Goal: Activity/Safety  Outcome: Progressing Towards Goal

## 2019-07-31 LAB
ALBUMIN SERPL-MCNC: 2.6 G/DL (ref 3.5–5)
ALBUMIN/GLOB SERPL: 0.7 {RATIO} (ref 1.1–2.2)
ALP SERPL-CCNC: 86 U/L (ref 45–117)
ALT SERPL-CCNC: 17 U/L (ref 12–78)
ANION GAP SERPL CALC-SCNC: 6 MMOL/L (ref 5–15)
AST SERPL-CCNC: 17 U/L (ref 15–37)
BASOPHILS # BLD: 0 K/UL (ref 0–0.1)
BASOPHILS NFR BLD: 0 % (ref 0–1)
BILIRUB SERPL-MCNC: 0.3 MG/DL (ref 0.2–1)
BUN SERPL-MCNC: 20 MG/DL (ref 6–20)
BUN/CREAT SERPL: 18 (ref 12–20)
CALCIUM SERPL-MCNC: 8.2 MG/DL (ref 8.5–10.1)
CHLORIDE SERPL-SCNC: 118 MMOL/L (ref 97–108)
CO2 SERPL-SCNC: 24 MMOL/L (ref 21–32)
CREAT SERPL-MCNC: 1.09 MG/DL (ref 0.7–1.3)
DIFFERENTIAL METHOD BLD: ABNORMAL
EOSINOPHIL # BLD: 0.4 K/UL (ref 0–0.4)
EOSINOPHIL NFR BLD: 6 % (ref 0–7)
ERYTHROCYTE [DISTWIDTH] IN BLOOD BY AUTOMATED COUNT: 16.9 % (ref 11.5–14.5)
GLOBULIN SER CALC-MCNC: 3.6 G/DL (ref 2–4)
GLUCOSE BLD STRIP.AUTO-MCNC: 102 MG/DL (ref 65–100)
GLUCOSE BLD STRIP.AUTO-MCNC: 108 MG/DL (ref 65–100)
GLUCOSE BLD STRIP.AUTO-MCNC: 122 MG/DL (ref 65–100)
GLUCOSE BLD STRIP.AUTO-MCNC: 71 MG/DL (ref 65–100)
GLUCOSE BLD STRIP.AUTO-MCNC: 74 MG/DL (ref 65–100)
GLUCOSE BLD STRIP.AUTO-MCNC: 76 MG/DL (ref 65–100)
GLUCOSE BLD STRIP.AUTO-MCNC: 77 MG/DL (ref 65–100)
GLUCOSE BLD STRIP.AUTO-MCNC: 78 MG/DL (ref 65–100)
GLUCOSE BLD STRIP.AUTO-MCNC: 90 MG/DL (ref 65–100)
GLUCOSE BLD STRIP.AUTO-MCNC: 91 MG/DL (ref 65–100)
GLUCOSE SERPL-MCNC: 80 MG/DL (ref 65–100)
HCT VFR BLD AUTO: 30.6 % (ref 36.6–50.3)
HGB BLD-MCNC: 9.5 G/DL (ref 12.1–17)
IMM GRANULOCYTES # BLD AUTO: 0 K/UL (ref 0–0.04)
IMM GRANULOCYTES NFR BLD AUTO: 0 % (ref 0–0.5)
LYMPHOCYTES # BLD: 1.2 K/UL (ref 0.8–3.5)
LYMPHOCYTES NFR BLD: 18 % (ref 12–49)
MCH RBC QN AUTO: 29.4 PG (ref 26–34)
MCHC RBC AUTO-ENTMCNC: 31 G/DL (ref 30–36.5)
MCV RBC AUTO: 94.7 FL (ref 80–99)
MONOCYTES # BLD: 0.7 K/UL (ref 0–1)
MONOCYTES NFR BLD: 10 % (ref 5–13)
NEUTS SEG # BLD: 4.5 K/UL (ref 1.8–8)
NEUTS SEG NFR BLD: 66 % (ref 32–75)
NRBC # BLD: 0 K/UL (ref 0–0.01)
NRBC BLD-RTO: 0 PER 100 WBC
PLATELET # BLD AUTO: 294 K/UL (ref 150–400)
PMV BLD AUTO: 10.1 FL (ref 8.9–12.9)
POTASSIUM SERPL-SCNC: 3.6 MMOL/L (ref 3.5–5.1)
PROT SERPL-MCNC: 6.2 G/DL (ref 6.4–8.2)
RBC # BLD AUTO: 3.23 M/UL (ref 4.1–5.7)
SERVICE CMNT-IMP: ABNORMAL
SERVICE CMNT-IMP: NORMAL
SODIUM SERPL-SCNC: 148 MMOL/L (ref 136–145)
TSH SERPL DL<=0.05 MIU/L-ACNC: 3.6 UIU/ML (ref 0.36–3.74)
WBC # BLD AUTO: 6.8 K/UL (ref 4.1–11.1)

## 2019-07-31 PROCEDURE — 99218 HC RM OBSERVATION: CPT

## 2019-07-31 PROCEDURE — 84443 ASSAY THYROID STIM HORMONE: CPT

## 2019-07-31 PROCEDURE — C1751 CATH, INF, PER/CENT/MIDLINE: HCPCS | Performed by: INTERNAL MEDICINE

## 2019-07-31 PROCEDURE — 80053 COMPREHEN METABOLIC PANEL: CPT

## 2019-07-31 PROCEDURE — 74011000250 HC RX REV CODE- 250: Performed by: INTERNAL MEDICINE

## 2019-07-31 PROCEDURE — 36415 COLL VENOUS BLD VENIPUNCTURE: CPT

## 2019-07-31 PROCEDURE — 74011250636 HC RX REV CODE- 250/636: Performed by: INTERNAL MEDICINE

## 2019-07-31 PROCEDURE — C1769 GUIDE WIRE: HCPCS | Performed by: INTERNAL MEDICINE

## 2019-07-31 PROCEDURE — 4A023N8 MEASUREMENT OF CARDIAC SAMPLING AND PRESSURE, BILATERAL, PERCUTANEOUS APPROACH: ICD-10-PCS | Performed by: INTERNAL MEDICINE

## 2019-07-31 PROCEDURE — 65660000000 HC RM CCU STEPDOWN

## 2019-07-31 PROCEDURE — 82962 GLUCOSE BLOOD TEST: CPT

## 2019-07-31 PROCEDURE — 77030013744: Performed by: INTERNAL MEDICINE

## 2019-07-31 PROCEDURE — B2111ZZ FLUOROSCOPY OF MULTIPLE CORONARY ARTERIES USING LOW OSMOLAR CONTRAST: ICD-10-PCS | Performed by: INTERNAL MEDICINE

## 2019-07-31 PROCEDURE — 77030004533 HC CATH ANGI DX IMP BSC -B: Performed by: INTERNAL MEDICINE

## 2019-07-31 PROCEDURE — 74011250637 HC RX REV CODE- 250/637: Performed by: FAMILY MEDICINE

## 2019-07-31 PROCEDURE — C1760 CLOSURE DEV, VASC: HCPCS | Performed by: INTERNAL MEDICINE

## 2019-07-31 PROCEDURE — C1887 CATHETER, GUIDING: HCPCS | Performed by: INTERNAL MEDICINE

## 2019-07-31 PROCEDURE — 96374 THER/PROPH/DIAG INJ IV PUSH: CPT

## 2019-07-31 PROCEDURE — B2161ZZ FLUOROSCOPY OF RIGHT AND LEFT HEART USING LOW OSMOLAR CONTRAST: ICD-10-PCS | Performed by: INTERNAL MEDICINE

## 2019-07-31 PROCEDURE — 74011250636 HC RX REV CODE- 250/636: Performed by: FAMILY MEDICINE

## 2019-07-31 PROCEDURE — 85025 COMPLETE CBC W/AUTO DIFF WBC: CPT

## 2019-07-31 PROCEDURE — 74011250636 HC RX REV CODE- 250/636

## 2019-07-31 PROCEDURE — 99153 MOD SED SAME PHYS/QHP EA: CPT | Performed by: INTERNAL MEDICINE

## 2019-07-31 PROCEDURE — 99152 MOD SED SAME PHYS/QHP 5/>YRS: CPT | Performed by: INTERNAL MEDICINE

## 2019-07-31 PROCEDURE — 93460 R&L HRT ART/VENTRICLE ANGIO: CPT | Performed by: INTERNAL MEDICINE

## 2019-07-31 RX ORDER — SODIUM CHLORIDE 9 MG/ML
1.5 INJECTION, SOLUTION INTRAVENOUS CONTINUOUS
Status: DISCONTINUED | OUTPATIENT
Start: 2019-07-31 | End: 2019-07-31 | Stop reason: HOSPADM

## 2019-07-31 RX ORDER — FENTANYL CITRATE 50 UG/ML
INJECTION, SOLUTION INTRAMUSCULAR; INTRAVENOUS AS NEEDED
Status: DISCONTINUED | OUTPATIENT
Start: 2019-07-31 | End: 2019-07-31 | Stop reason: HOSPADM

## 2019-07-31 RX ORDER — HEPARIN SODIUM 200 [USP'U]/100ML
INJECTION, SOLUTION INTRAVENOUS
Status: COMPLETED | OUTPATIENT
Start: 2019-07-31 | End: 2019-07-31

## 2019-07-31 RX ORDER — ACETYLCYSTEINE 200 MG/ML
600 SOLUTION ORAL; RESPIRATORY (INHALATION)
Status: DISPENSED | OUTPATIENT
Start: 2019-07-31 | End: 2019-07-31

## 2019-07-31 RX ORDER — HYDRALAZINE HYDROCHLORIDE 20 MG/ML
10 INJECTION INTRAMUSCULAR; INTRAVENOUS
Status: DISCONTINUED | OUTPATIENT
Start: 2019-07-31 | End: 2019-08-09 | Stop reason: HOSPADM

## 2019-07-31 RX ORDER — SODIUM CHLORIDE 9 MG/ML
3 INJECTION, SOLUTION INTRAVENOUS CONTINUOUS
Status: DISPENSED | OUTPATIENT
Start: 2019-07-31 | End: 2019-07-31

## 2019-07-31 RX ORDER — MIDAZOLAM HYDROCHLORIDE 1 MG/ML
INJECTION, SOLUTION INTRAMUSCULAR; INTRAVENOUS AS NEEDED
Status: DISCONTINUED | OUTPATIENT
Start: 2019-07-31 | End: 2019-07-31 | Stop reason: HOSPADM

## 2019-07-31 RX ORDER — LIDOCAINE HYDROCHLORIDE 10 MG/ML
INJECTION INFILTRATION; PERINEURAL AS NEEDED
Status: DISCONTINUED | OUTPATIENT
Start: 2019-07-31 | End: 2019-07-31 | Stop reason: HOSPADM

## 2019-07-31 RX ADMIN — Medication 10 ML: at 06:21

## 2019-07-31 RX ADMIN — SODIUM CHLORIDE 1.5 ML/KG/HR: 900 INJECTION, SOLUTION INTRAVENOUS at 08:42

## 2019-07-31 RX ADMIN — Medication 10 ML: at 13:23

## 2019-07-31 RX ADMIN — DEXTROSE MONOHYDRATE 12.5 G: 500 INJECTION PARENTERAL at 06:30

## 2019-07-31 RX ADMIN — ACETYLCYSTEINE 600 MG: 200 SOLUTION ORAL; RESPIRATORY (INHALATION) at 05:54

## 2019-07-31 RX ADMIN — TIMOLOL MALEATE 1 DROP: 5 SOLUTION OPHTHALMIC at 11:29

## 2019-07-31 RX ADMIN — TIMOLOL MALEATE 1 DROP: 5 SOLUTION OPHTHALMIC at 20:04

## 2019-07-31 RX ADMIN — SODIUM CHLORIDE 3 ML/KG/HR: 900 INJECTION, SOLUTION INTRAVENOUS at 07:42

## 2019-07-31 RX ADMIN — HYDRALAZINE HYDROCHLORIDE 10 MG: 20 INJECTION INTRAMUSCULAR; INTRAVENOUS at 20:04

## 2019-07-31 RX ADMIN — Medication 10 ML: at 21:47

## 2019-07-31 RX ADMIN — THERA TABS 1 TABLET: TAB at 11:29

## 2019-07-31 RX ADMIN — HYDRALAZINE HYDROCHLORIDE 10 MG: 20 INJECTION INTRAMUSCULAR; INTRAVENOUS at 13:22

## 2019-07-31 NOTE — PROGRESS NOTES
TRANSFER - IN REPORT:    Verbal report received from Matt Saba on Thais Matos  being received from procedure room for routine progression of care. Report consisted of patients Situation, Background, Assessment and Recommendations(SBAR). Information from the following report(s) SBAR was reviewed with the receiving clinician. Opportunity for questions and clarification was provided. Assessment completed upon patients arrival to [address] [place] [address] and care assumed. Cardiac Cath Lab Recovery Arrival Note:    Thais Matos arrived to [facility] recovery area. Patient procedure= LHC; RHC. Patient on cardiac monitor, non-invasive blood pressure, SPO2 monitor. On O2 @ 2 lpm via NC.  IV  of NS on pump at 121 ml/hr. Patient status doing well without problems. Patient is A&Ox 3. Patient reports no CP. PROCEDURE SITE CHECK:    Procedure site:without any bleeding and no hematoma, No pain/discomfort reported at procedure site. No change in patient status. Continue to monitor patient and status.

## 2019-07-31 NOTE — PROGRESS NOTES
Patient counld not urinate; straight cath completed with Luis Chandler and India Eller; RN's; pt. tolerated well

## 2019-07-31 NOTE — PROGRESS NOTES
1930- Bedside and Verbal shift change report given to Becca Evans, RN (oncoming nurse) by Marcos Enriquez RN (offgoing nurse). Report included the following information SBAR, Kardex, Procedure Summary, Intake/Output, MAR, Accordion and Recent Results. [id number] [phone] Spoke to Dr. Keith Hager about increasing SBP. Dr. Keith Hager said to give hydralazine PRN for SBP >170. Pt had cath procedure today.     Problem: Heart Failure: Day 1  Goal: Diagnostic Test/Procedures  Outcome: Progressing Towards Goal

## 2019-07-31 NOTE — PROGRESS NOTES
TRANSFER - OUT REPORT: 
 
Verbal report given to Sanjeev Swain on Phani Mcelroy being transferred to [facility] Room 400 for routine progression of care Report consisted of patients Situation, Background, Assessment and  
Recommendations(SBAR). Information from the following report(s) SBAR was reviewed with the receiving nurse. Opportunity for questions and clarification was provided.

## 2019-07-31 NOTE — CONSULTS
[address]    Name:  Kait Santana  MR#:  [id number]  :  1929  ACCOUNT #:  [de-identified]  DATE OF SERVICE:  2019    REFERRING PHYSICIAN:  Dr. Amador Late.    HISTORY OF PRESENT ILLNESS:  The patient is well known to me for many years. We have known about his significant aortic stenosis for a long time. He has always declined intervention. He did consider it during this last admission when he was severely anemic, but his aortic stenosis was so severe, it was not felt that it would be safe to proceed with endoscopy procedures, this resolved, abnormality was improved. He was here for a couple of weeks, then went to rehab and went home on . Then, has had 3 falls since being discharged home. When I asked him why he is falling, he seems to think that his rugs were too wet after being cleaned. He denies that he had a syncopal spell and it is not obvious from his description that he does have syncope. He denies chest pain. He does admit to some dyspnea on exertion now with mild exertion. He denies orthopnea or PND. OTHER RECENT MEDICAL PROBLEMS:  Chronic kidney disease, poor appetite, significant weight loss, hypertension, and prostatectomy. PHYSICAL EXAMINATION:  GENERAL:  This is a well-developed, alert, elderly gentleman in no distress at this time. VITAL SIGNS:  Temperature is 97.5, pulse 64, respirations 20, blood pressure 173/74, and saturation 99%. HEENT:  Unremarkable. NECK:  Supple. No adenopathy. Normal carotid pulsations. HEART:  He has a loud systolic murmur, musical quality, in the aortic area. ABDOMEN:  Soft and nontender. No bruits. No ascites. EXTREMITIES:  No significant edema. Normal pedal pulses. LABORATORY DATA:  His EKG demonstrates sinus bradycardia at 51 beats per minute, prolonged QT interval at 435 msec. No change from the previous tracing.     His labs are remarkable for a slightly elevated troponin at 0.14 x3 over a 12-hour period. NT-proBNP is not measured at this time. Chest x-ray; interstitial thickening throughout both lungs is likely related to a background fibrosis. No acute abnormalities. IMPRESSION:  1. Recurrent falls; sounds like mechanical falls from the patient's description, but syncope is not excluded. 2.  He has critical aortic stenosis. He is admitting to dyspnea with mild exertion and this is new for him. He denies any chest pain or palpitations. 3.  Chronic kidney disease. His numbers have improved over his recent baseline. RECOMMENDATIONS:  1. The patient would benefit from TAVR. He would be considered too high risk for open aortic valve replacement. 2.  His troponin is elevated, so he needs to have his coronaries assessed, and we can start the process of evaluating the patient for TAVR while he is here if he so desires. Again, in the past, he has declined all of this. 3.  Further recommendations to follow. If he agrees to start the evaluation, we will plan on coronary angiography tomorrow morning.       Yanci Gloria MD [email or web address]  D:  07/30/2019 20:35  T:  07/30/2019 23:21  JOB #:  [id number]

## 2019-07-31 NOTE — PROGRESS NOTES
Hospitalist Progress Note          Deonte Cueva MD  Please call  and page for questions. Call physician on-call through the  7pm-7am    Daily Progress Note: 7/31/2019    Primary care provider:Reba Castillo MD    Date of admission: 7/29/2019  2:47 PM    Admission summery and hospital course:  51-year-old with past medical history of chronic pain, diabetes, bilateral cataracts, heart failure, and aortic stenosis, presenting to the hospital because of unwitnessed fall.  Since 7 a.m. in the morning, he had a fall and was lying on the floor and later on at 2 p.m., his granddaughter noticed that, called the EMS where his blood sugar was 68 though he was awake, and that was the reason he was brought to the hospital.  According to him, his knee was hurting and it gave away and that was his reason for fall. He does have a history of arthritis for the same.  Of note, he was recently admitted in 06/2019 for aortic stenosis, pulmonary edema, CKD stage III, had blood transfused.  EGD and colonoscopy could not be done because he has severe aortic stenosis and balloon aortic valvuloplasty was also not done because his kidney function is getting worse. Ese Arguelles was sent to the rehabilitation from there, but then he went home just for a weekend, fell and came back to the hospital.     Subjective:   Patient said he doing OK. Assessment/Plan:   History of severe aortic stenosis, balloon aortic valvuloplasty postponed in the past.  Cardiac cath today to evaluate for TAVR. Report to follow. Appreciated Cardiology consult. Hypertension: BP is elevated and uncontrolled. Will monitor with Hydralazine as PRN. May need amlodipine added. Fall.  Recurrent. Probably due to age related weakness and medical issue including anemia, bradycardia and aortic stenosis. Continue Physical Therapy/Occupational Therapy. Continue telemetry for now.    He has fallen 3 times in the last 1 week at home. He went to home from rehab on . He might need long-term care at this point in time, only a granddaughter to take care of him.     Hypoglycemia, POA. Blood glucose is reasonable now. Continue to monitor.      Troponin of 0.14. Troponin is stabilized.  patient has no CP, SOB and palpitation. TSH normal.  EKG without significant finding.      Chronic kidney disease stage III.  Kidney function is improving.      Anemia.    Esophagogastroduodenoscopy and colonoscopy not done last time due to high risk  His hemoglobin is much better at 11 at this time.     See orders for other plans. VTE prophylaxis: SCD  Code status: Full  Discussed plan of care with Patient/Family and Nurse. Pre-admission lived at home. Discharge planning: pending. Patient might need SNF/Rehab. Review of Systems:     Review of Systems:  Symptom  Y/N  Comments   Symptom  Y/N  Comments    Fever/Chills  n    Chest Pain  n    Poor Appetite  n    Edema  n     Cough  n   Abdominal Pain       Sputum  n   Joint Pain      SOB/JOSUE  n   Pruritis/Rash      Nausea/vomit     Tolerating PT/OT      Diarrhea     Tolerating Diet      Constipation     Other      Could not obtain due to:         Objective:   Physical Exam:     Visit Vitals  /68 (BP 1 Location: Right arm, BP Patient Position: At rest)   Pulse 80   Temp 97.5 °F (36.4 °C)   Resp 20   Ht 5' 7\" (1.702 m)   Wt 80.7 kg (177 lb 14.6 oz)   SpO2 93%   BMI 27.86 kg/m²    O2 Flow Rate (L/min): 2 l/min O2 Device: Nasal cannula    Temp (24hrs), Av.4 °F (36.3 °C), Min:96.2 °F (35.7 °C), Max:98 °F (36.7 °C)    701 - 1900  In: -   Out: 400 [Urine:400]   1901 -  0700  In: 240 [P.O.:240]  Out: 740 [Urine:740]    General:  Alert, cooperative, no distress, appears stated age. Lungs:   Clear to auscultation bilaterally. Chest wall:  No tenderness or deformity. Heart:  Regular rate and rhythm, S1, S2 normal, Systolic murmur present.     Abdomen:   Soft, non-tender. Bowel sounds normal.    Extremities: Extremities normal, atraumatic, no cyanosis or edema. Pulses: 2+ and symmetric all extremities. Skin: Skin color, texture, turgor normal. No rashes or lesions   Neurologic: Patient is talking and communicating appropriately. Patient follows the command well. Patient is moving all his extremities.       Data Review:       Recent Days:  Recent Labs     07/31/19  0310 07/29/19  1619   WBC 6.8 10.9   HGB 9.5* 11.0*   HCT 30.6* 36.8    342     Recent Labs     07/31/19  0310 07/30/19  0503 07/29/19  1619   * 149* 148*   K 3.6 3.6 3.9   * 119* 115*   CO2 24 24 21   GLU 80 102* 102*   BUN 20 31* 31*   CREA 1.09 1.16 1.25   CA 8.2* 8.8 9.2   MG  --   --  2.2   ALB 2.6* 2.7* 3.1*   SGOT 17 26 35   ALT 17 17 20     No results for input(s): PH, PCO2, PO2, HCO3, FIO2 in the last 72 hours. 24 Hour Results:  Recent Results (from the past 24 hour(s))   GLUCOSE, POC    Collection Time: 07/30/19  9:23 PM   Result Value Ref Range    Glucose (POC) 117 (H) 65 - 100 mg/dL    Performed by Kennedi Mata    TSH 3RD GENERATION    Collection Time: 07/31/19  3:10 AM   Result Value Ref Range    TSH 3.60 0.36 - 3.74 uIU/mL   CBC WITH AUTOMATED DIFF    Collection Time: 07/31/19  3:10 AM   Result Value Ref Range    WBC 6.8 4.1 - 11.1 K/uL    RBC 3.23 (L) 4.10 - 5.70 M/uL    HGB 9.5 (L) 12.1 - 17.0 g/dL    HCT 30.6 (L) 36.6 - 50.3 %    MCV 94.7 80.0 - 99.0 FL    MCH 29.4 26.0 - 34.0 PG    MCHC 31.0 30.0 - 36.5 g/dL    RDW 16.9 (H) 11.5 - 14.5 %    PLATELET 687 721 - 192 K/uL    MPV 10.1 8.9 - 12.9 FL    NRBC 0.0 0  WBC    ABSOLUTE NRBC 0.00 0.00 - 0.01 K/uL    NEUTROPHILS 66 32 - 75 %    LYMPHOCYTES 18 12 - 49 %    MONOCYTES 10 5 - 13 %    EOSINOPHILS 6 0 - 7 %    BASOPHILS 0 0 - 1 %    IMMATURE GRANULOCYTES 0 0.0 - 0.5 %    ABS. NEUTROPHILS 4.5 1.8 - 8.0 K/UL    ABS. LYMPHOCYTES 1.2 0.8 - 3.5 K/UL    ABS. MONOCYTES 0.7 0.0 - 1.0 K/UL    ABS.  EOSINOPHILS 0.4 0.0 - 0.4 K/UL    ABS. BASOPHILS 0.0 0.0 - 0.1 K/UL    ABS. IMM. GRANS. 0.0 0.00 - 0.04 K/UL    DF AUTOMATED     METABOLIC PANEL, COMPREHENSIVE    Collection Time: 07/31/19  3:10 AM   Result Value Ref Range    Sodium 148 (H) 136 - 145 mmol/L    Potassium 3.6 3.5 - 5.1 mmol/L    Chloride 118 (H) 97 - 108 mmol/L    CO2 24 21 - 32 mmol/L    Anion gap 6 5 - 15 mmol/L    Glucose 80 65 - 100 mg/dL    BUN 20 6 - 20 MG/DL    Creatinine 1.09 0.70 - 1.30 MG/DL    BUN/Creatinine ratio 18 12 - 20      GFR est AA >60 >60 ml/min/1.73m2    GFR est non-AA >60 >60 ml/min/1.73m2    Calcium 8.2 (L) 8.5 - 10.1 MG/DL    Bilirubin, total 0.3 0.2 - 1.0 MG/DL    ALT (SGPT) 17 12 - 78 U/L    AST (SGOT) 17 15 - 37 U/L    Alk.  phosphatase 86 45 - 117 U/L    Protein, total 6.2 (L) 6.4 - 8.2 g/dL    Albumin 2.6 (L) 3.5 - 5.0 g/dL    Globulin 3.6 2.0 - 4.0 g/dL    A-G Ratio 0.7 (L) 1.1 - 2.2     GLUCOSE, POC    Collection Time: 07/31/19  6:26 AM   Result Value Ref Range    Glucose (POC) 74 65 - 100 mg/dL    Performed by Kennedi Mata    GLUCOSE, POC    Collection Time: 07/31/19  6:58 AM   Result Value Ref Range    Glucose (POC) 91 65 - 100 mg/dL    Performed by Cl Granda, POC    Collection Time: 07/31/19 12:09 PM   Result Value Ref Range    Glucose (POC) 77 65 - 100 mg/dL    Performed by Ish Hough, POC    Collection Time: 07/31/19 12:24 PM   Result Value Ref Range    Glucose (POC) 71 65 - 100 mg/dL    Performed by Ish Hough, POC    Collection Time: 07/31/19 12:31 PM   Result Value Ref Range    Glucose (POC) 78 65 - 100 mg/dL    Performed by Ish Hough, POC    Collection Time: 07/31/19 12:44 PM   Result Value Ref Range    Glucose (POC) 76 65 - 100 mg/dL    Performed by Srini Mccormick    GLUCOSE, POC    Collection Time: 07/31/19 12:46 PM   Result Value Ref Range    Glucose (POC) 90 65 - 100 mg/dL    Performed by Nadir [address] [place] [id number] [place], POC    Collection Time: 07/31/19  1:16 PM   Result Value Ref Range    Glucose (POC) 108 (H) 65 - 100 mg/dL    Performed by [facility]    GLUCOSE, POC    Collection Time: 07/31/19  4:36 PM   Result Value Ref Range    Glucose (POC) 122 (H) 65 - 100 mg/dL    Performed by Blaine Francisco        Problem List:  Problem List as of 7/31/2019 Date Reviewed: 5/21/2019          Codes Class Noted - Resolved    KEIKO (acute kidney injury) ([facility] [address] ICD-10-CM: N17.9  ICD-9-CM: 584.9  6/19/2019 - Present        Severe anemia ICD-10-CM: D64.9  ICD-9-CM: 285.9  6/19/2019 - Present        Aortic stenosis ICD-10-CM: I35.0  ICD-9-CM: 424.1  6/17/2019 - Present        GIB (gastrointestinal bleeding) ICD-10-CM: K92.2  ICD-9-CM: 578.9  6/17/2019 - Present        Dehydration ICD-10-CM: E86.0  ICD-9-CM: 276.51  6/11/2019 - Present        Acute hypernatremia ICD-10-CM: E87.0  ICD-9-CM: 276.0  6/11/2019 - Present        Abnormal EKG ICD-10-CM: R94.31  ICD-9-CM: 794.31  6/11/2019 - Present        CKD (chronic kidney disease) stage 3, GFR 30-59 ml/min ([facility]) ICD-10-CM: N18.3  ICD-9-CM: 585.3  2/7/2019 - Present        Anemia ICD-10-CM: D64.9  ICD-9-CM: 285.9  2/7/2019 - Present        Severe aortic stenosis ICD-10-CM: I35.0  ICD-9-CM: 424.1  12/21/2015 - Present        Fall ICD-10-CM: W19. Radha   ICD-9-CM: E888.9  12/21/2015 - Present        Rhabdomyolysis ICD-10-CM: M62.82  ICD-9-CM: 728.88  12/19/2015 - Present        Cellulitis and abscess of leg, except foot ICD-10-CM: L03.119, L02.419  ICD-9-CM: 682.6  2/2/2011 - Present        Diabetes ([facility] [address] ICD-10-CM: E11.9  ICD-9-CM: 250.00  2/2/2011 - Present        Essential hypertension, benign (Chronic) ICD-10-CM: I10  ICD-9-CM: 401.1  2/2/2011 - Present        Hip joint replacement (Chronic) ICD-9-CM: V43.64  2/2/2011 - Present        Morbidly obese (HCC) (Chronic) ICD-10-CM: E66.01  ICD-9-CM: 278.01  2/2/2011 - Present        RESOLVED: Elevated troponin I level ICD-10-CM: R74.8  ICD-9-CM: 790.6  12/19/2015 - 12/21/2015        RESOLVED: Encephalopathy ICD-10-CM: G93.40  ICD-9-CM: 348.30  12/19/2015 - 12/21/2015        RESOLVED: Leukocytosis ICD-10-CM: M71.285  ICD-9-CM: 288.60  12/19/2015 - 12/21/2015              Medications reviewed  Current Facility-Administered Medications   Medication Dose Route Frequency    acetylcysteine (MUCOMYST) 200 mg/mL (20 %) solution 600 mg  600 mg Oral NOW    hydrALAZINE (APRESOLINE) 20 mg/mL injection 10 mg  10 mg IntraVENous Q6H PRN    therapeutic multivitamin (THERAGRAN) tablet 1 Tab  1 Tab Oral DAILY    timolol (TIMOPTIC) 0.5 % ophthalmic solution 1 Drop  1 Drop Left Eye BID    sodium chloride (NS) flush 5-40 mL  5-40 mL IntraVENous Q8H    sodium chloride (NS) flush 5-40 mL  5-40 mL IntraVENous PRN    acetaminophen (TYLENOL) tablet 650 mg  650 mg Oral Q4H PRN    ondansetron (ZOFRAN ODT) tablet 4 mg  4 mg Oral Q4H PRN    insulin lispro (HUMALOG) injection   SubCUTAneous AC&HS    glucose chewable tablet 16 g  4 Tab Oral PRN    dextrose (D50W) injection syrg 12.5-25 g  12.5-25 g IntraVENous PRN    glucagon (GLUCAGEN) injection 1 mg  1 mg IntraMUSCular PRN       Care Plan discussed with: Patient/Family and Nurse    Total time spent with patient: 30 minutes.     Che Umana MD

## 2019-07-31 NOTE — PROGRESS NOTES
Orders received, chart reviewed and patient in cardiac cath lab. Will f/u today as able and patient available.  Sarbjit Thakkar M.S., OTR/L

## 2019-07-31 NOTE — CONSULTS
Cardiology Note dictated # [id number]  Imp:  Admitted with recurrent falls: 3 in the last few days  These were probably mechanical falls but syncope not excluded  Has critical  non rheumatic AS with preserved LV systolic function   Elevated troponin: flat: 0.14 -> 0.14 -> 0.14 not consistent with NSTEMI and more likely from LV strain from the 100 mmHg LV/Ao gradient/severely increased afterload  Improved renal function compared to his recent baseline  Recommend: Will offer to start the process for evaluation for TAVR if he is interested in having this done. Agree with Dr Ted West. He shouldn't be living at home alone  Thank you for this referral.   Will follow along with you. Rasheeda Wallace MD    Addendum: The patient agrees to proceed with cath and TAVR evaluation. I spoke with his granddaughter, his caretaker by phone to notify her that he wants to proceed. Cath scheduled for tomorrow morning at 8:15.  Will order acetylcysteine and IV hydration

## 2019-07-31 NOTE — PROGRESS NOTES
Virtual reviewer communicated change to CM which reflect outpatient observation order written prior to discharge order being written. Code 44 delivered to patient. CM met with the patient and provided to the patient the printed information about her outpatient observation status. The patient was given the flyer entitled, \"Medicare Outpatient Observation: Information & notification. \" All questions were answered, no additional discharge needs identified at this time and patient expects to return to their (home, assisted living facility, relatives home, etc.) after discharge today. Oral and Written notification given to patient and/or caregiver informing them that they are currently an Outpatient receiving care in our facility. Outpatient services include Observation Services under [facility] requirements.       Kiley Alanis MS

## 2019-07-31 NOTE — DIABETES MGMT
[facility]    DTC Progress Note    Recommendations/ Comments: Chart reviewed due to hypoglycemia. If appropriate, please consider changing correction scale to high sensitivity. Also, please document po intake. Current hospital DM medication:  Correction scale Humalog with normal sensitivity. Chart reviewed on Alber Wallace. Patient is a 80 y.o. male with known diabetes on no diabetes medications at home. A1c:   Lab Results   Component Value Date/Time    Hemoglobin A1c <3.5 (L) 06/12/2019 03:50 AM       Recent Glucose Results:   Lab Results   Component Value Date/Time    GLU 80 07/31/2019 03:10 AM    GLUCPOC 90 07/31/2019 12:46 PM    GLUCPOC 76 07/31/2019 12:44 PM    GLUCPOC 78 07/31/2019 12:31 PM        Lab Results   Component Value Date/Time    Creatinine 1.09 07/31/2019 03:10 AM     Estimated Creatinine Clearance: 46.7 mL/min (based on SCr of 1.09 mg/dL). Active Orders   Diet    DIET DIABETIC CONSISTENT CARB Regular; 2 GM NA (House Low NA)        PO intake:   Patient Vitals for the past 72 hrs:   % Diet Eaten   07/30/19 0820 75 %       Will continue to follow as needed.     Thank you      Hailey Blair RD, CDE      Time spent: 5 minutes

## 2019-07-31 NOTE — PROGRESS NOTES
Occupational Therapy    [phone] Patient on bed rest for 4 hours post cardiac cath. Will re-attempt OT evaluation tomorrow. Heather Wright MS OTR/L

## 2019-07-31 NOTE — PROGRESS NOTES
Problem: Heart Failure: Day 1  Goal: Activity/Safety  Outcome: Progressing Towards Goal     Pt heart rhythm sinus errol/NSR, HR 50-60s. Pt informed and prepared for cardiac procedure on 7/31/19. IVF started and NPO at midnight. Pt verbalized understanding. Opportunity for questions provided. Problem: Falls - Risk of  Goal: *Absence of Falls  Description  Document Joaquin Mcmahon Fall Risk and appropriate interventions in the flowsheet. Outcome: Progressing Towards Goal  Note:   Fall Risk Interventions:  Mobility Interventions: Communicate number of staff needed for ambulation/transfer, Patient to call before getting OOB    Mentation Interventions: Adequate sleep, hydration, pain control, Update white board, Toileting rounds    Medication Interventions: Patient to call before getting OOB    Elimination Interventions: Call light in reach, Patient to call for help with toileting needs, Urinal in reach    History of Falls Interventions: Door open when patient unattended    Last 3 Recorded Weights in this Encounter    07/30/19 0430 07/30/19 1310 07/31/19 0312   Weight: 75.4 kg (166 lb 3.6 oz) 74.4 kg (164 lb 0.4 oz) 80.7 kg (177 lb 14.6 oz)    Weight variance noted. Bedside shift change report given to Lena Mckeon (oncoming nurse) by Komal Carrasco RN (offgoing nurse). Report included the following information SBAR, Kardex, Intake/Output, MAR, Recent Results and Cardiac Rhythm Sinus errol/NSR .

## 2019-07-31 NOTE — PROGRESS NOTES
Patient received in cath lab RR; hooked to monitoring equipment; armband; consent; allergies checked; grons prepped bilterally

## 2019-07-31 NOTE — PROGRESS NOTES
Physical Therapy    Orders received and acknowledged. Patient receiving cardiac cath. Will re-attempt PT evaluation on patient return. 1205 Patient on bed rest for 4 hours post cardiac cath. Will re-attempt PT evaluation as able.      Thank you,  Tyrell GREGORIOT

## 2019-08-01 LAB
ALBUMIN SERPL-MCNC: 2.5 G/DL (ref 3.5–5)
ALBUMIN/GLOB SERPL: 0.7 {RATIO} (ref 1.1–2.2)
ALP SERPL-CCNC: 85 U/L (ref 45–117)
ALT SERPL-CCNC: 20 U/L (ref 12–78)
ANION GAP SERPL CALC-SCNC: 6 MMOL/L (ref 5–15)
AST SERPL-CCNC: 17 U/L (ref 15–37)
BASOPHILS # BLD: 0 K/UL (ref 0–0.1)
BASOPHILS NFR BLD: 0 % (ref 0–1)
BILIRUB SERPL-MCNC: 0.5 MG/DL (ref 0.2–1)
BUN SERPL-MCNC: 17 MG/DL (ref 6–20)
BUN/CREAT SERPL: 15 (ref 12–20)
CALCIUM SERPL-MCNC: 8.5 MG/DL (ref 8.5–10.1)
CHLORIDE SERPL-SCNC: 117 MMOL/L (ref 97–108)
CO2 SERPL-SCNC: 25 MMOL/L (ref 21–32)
CREAT SERPL-MCNC: 1.11 MG/DL (ref 0.7–1.3)
DIFFERENTIAL METHOD BLD: ABNORMAL
EOSINOPHIL # BLD: 0.4 K/UL (ref 0–0.4)
EOSINOPHIL NFR BLD: 4 % (ref 0–7)
ERYTHROCYTE [DISTWIDTH] IN BLOOD BY AUTOMATED COUNT: 17.1 % (ref 11.5–14.5)
GLOBULIN SER CALC-MCNC: 3.6 G/DL (ref 2–4)
GLUCOSE BLD STRIP.AUTO-MCNC: 108 MG/DL (ref 65–100)
GLUCOSE BLD STRIP.AUTO-MCNC: 112 MG/DL (ref 65–100)
GLUCOSE BLD STRIP.AUTO-MCNC: 93 MG/DL (ref 65–100)
GLUCOSE SERPL-MCNC: 93 MG/DL (ref 65–100)
HCT VFR BLD AUTO: 33.1 % (ref 36.6–50.3)
HGB BLD-MCNC: 10.3 G/DL (ref 12.1–17)
IMM GRANULOCYTES # BLD AUTO: 0 K/UL (ref 0–0.04)
IMM GRANULOCYTES NFR BLD AUTO: 0 % (ref 0–0.5)
LYMPHOCYTES # BLD: 1 K/UL (ref 0.8–3.5)
LYMPHOCYTES NFR BLD: 11 % (ref 12–49)
MCH RBC QN AUTO: 28.9 PG (ref 26–34)
MCHC RBC AUTO-ENTMCNC: 31.1 G/DL (ref 30–36.5)
MCV RBC AUTO: 93 FL (ref 80–99)
MONOCYTES # BLD: 0.9 K/UL (ref 0–1)
MONOCYTES NFR BLD: 10 % (ref 5–13)
NEUTS SEG # BLD: 7 K/UL (ref 1.8–8)
NEUTS SEG NFR BLD: 75 % (ref 32–75)
NRBC # BLD: 0 K/UL (ref 0–0.01)
NRBC BLD-RTO: 0 PER 100 WBC
PLATELET # BLD AUTO: 344 K/UL (ref 150–400)
PMV BLD AUTO: 10.3 FL (ref 8.9–12.9)
POTASSIUM SERPL-SCNC: 3.7 MMOL/L (ref 3.5–5.1)
PROT SERPL-MCNC: 6.1 G/DL (ref 6.4–8.2)
RBC # BLD AUTO: 3.56 M/UL (ref 4.1–5.7)
SERVICE CMNT-IMP: ABNORMAL
SERVICE CMNT-IMP: ABNORMAL
SERVICE CMNT-IMP: NORMAL
SODIUM SERPL-SCNC: 148 MMOL/L (ref 136–145)
WBC # BLD AUTO: 9.4 K/UL (ref 4.1–11.1)

## 2019-08-01 PROCEDURE — 99218 HC RM OBSERVATION: CPT

## 2019-08-01 PROCEDURE — 82962 GLUCOSE BLOOD TEST: CPT

## 2019-08-01 PROCEDURE — 77010033678 HC OXYGEN DAILY

## 2019-08-01 PROCEDURE — 97530 THERAPEUTIC ACTIVITIES: CPT

## 2019-08-01 PROCEDURE — 74011250637 HC RX REV CODE- 250/637: Performed by: FAMILY MEDICINE

## 2019-08-01 PROCEDURE — 97165 OT EVAL LOW COMPLEX 30 MIN: CPT

## 2019-08-01 PROCEDURE — 85025 COMPLETE CBC W/AUTO DIFF WBC: CPT

## 2019-08-01 PROCEDURE — 97116 GAIT TRAINING THERAPY: CPT

## 2019-08-01 PROCEDURE — 96376 TX/PRO/DX INJ SAME DRUG ADON: CPT

## 2019-08-01 PROCEDURE — 80053 COMPREHEN METABOLIC PANEL: CPT

## 2019-08-01 PROCEDURE — 97161 PT EVAL LOW COMPLEX 20 MIN: CPT

## 2019-08-01 PROCEDURE — 74011250636 HC RX REV CODE- 250/636: Performed by: FAMILY MEDICINE

## 2019-08-01 PROCEDURE — 36415 COLL VENOUS BLD VENIPUNCTURE: CPT

## 2019-08-01 RX ADMIN — Medication 10 ML: at 07:20

## 2019-08-01 RX ADMIN — TIMOLOL MALEATE 1 DROP: 5 SOLUTION OPHTHALMIC at 18:52

## 2019-08-01 RX ADMIN — Medication 10 ML: at 14:12

## 2019-08-01 RX ADMIN — THERA TABS 1 TABLET: TAB at 09:05

## 2019-08-01 RX ADMIN — Medication 10 ML: at 21:22

## 2019-08-01 RX ADMIN — HYDRALAZINE HYDROCHLORIDE 10 MG: 20 INJECTION INTRAMUSCULAR; INTRAVENOUS at 01:42

## 2019-08-01 RX ADMIN — TIMOLOL MALEATE 1 DROP: 5 SOLUTION OPHTHALMIC at 09:05

## 2019-08-01 NOTE — PROGRESS NOTES
TRANSFER - OUT REPORT:    Verbal report given to [facility], RN (name) on Nisha Monreal  being transferred to 5 E (unit) for routine progression of care       Report consisted of patients Situation, Background, Assessment and   Recommendations(SBAR). Information from the following report(s) SBAR, Kardex, Procedure Summary, Intake/Output, MAR, Accordion and Recent Results was reviewed with the receiving nurse. Lines:   Peripheral IV 07/30/19 Left Forearm (Active)   Site Assessment Clean, dry, & intact 8/1/2019  3:18 PM   Phlebitis Assessment 0 8/1/2019  3:18 PM   Infiltration Assessment 0 8/1/2019  3:18 PM   Dressing Status Clean, dry, & intact 8/1/2019  3:18 PM   Dressing Type Tape;Transparent 8/1/2019  3:18 PM   Hub Color/Line Status Pink;Capped 8/1/2019  3:18 PM   Action Taken Open ports on tubing capped 8/1/2019  2:12 AM   Alcohol Cap Used Yes 8/1/2019  3:18 PM        Opportunity for questions and clarification was provided. Patient transported with:   Tech          PT was seen by PT today. PT informed nurse of what was seen during session and how to keep patient safe during stay.     Problem: Heart Failure: Day 1  Goal: Activity/Safety  Outcome: Progressing Towards Goal

## 2019-08-01 NOTE — DIABETES MGMT
[facility]    DTC Progress Note    Recommendations/ Comments: Chart reviewed due to hypoglycemia. Please encourage po intake to help support BG. Current hospital DM medication:  Correction scale Humalog with high sensitivity. Chart reviewed on Alber Wallace. Patient is a 80 y.o. male with known diabetes on no diabetes medications at home. A1c:   Lab Results   Component Value Date/Time    Hemoglobin A1c <3.5 (L) 06/12/2019 03:50 AM       Recent Glucose Results:   Lab Results   Component Value Date/Time    GLU 93 08/01/2019 03:18 AM    GLUCPOC 112 (H) 08/01/2019 11:38 AM    GLUCPOC 108 (H) 08/01/2019 06:30 AM    GLUCPOC 102 (H) 07/31/2019 09:43 PM        Lab Results   Component Value Date/Time    Creatinine 1.11 08/01/2019 03:18 AM     Estimated Creatinine Clearance: 46 mL/min (based on SCr of 1.11 mg/dL). Active Orders   Diet    DIET DIABETIC CONSISTENT CARB Regular; 2 GM NA (House Low NA)        PO intake:   Patient Vitals for the past 72 hrs:   % Diet Eaten   07/30/19 0820 75 %       Will continue to follow as needed.     Thank you      Jun Willams RD       Time spent: 5 minutes

## 2019-08-01 NOTE — PROGRESS NOTES
Problem: Self Care Deficits Care Plan (Adult)  Goal: *Acute Goals and Plan of Care (Insert Text)  Description    FUNCTIONAL STATUS PRIOR TO ADMISSION: Patient was modified independent using a Rollator for functional mobility. Pt lives alone and has family who checks in on him, has hired help (mostly housekeeping and meal prep) T,W, and TH for 4 hours each day, was discharged back to home about one week ago from rehab at a SNF, had not fallen (prior to this fall) in the last 12 months, wears a call system pendant but did not use it during this fall, and does not use home 02. He sleeps in a lift chair. HOME SUPPORT: The patient lived alone with family and care aides 4 hours a day 3x/week to provide assistance. Occupational Therapy Goals  Initiated 8/1/2019  1. Patient will perform standing ADLs with supervision/set-up within 7 day(s). 2.  Patient will perform lower body dressing using AE PRN with supervision/set-up within 7 day(s). 3.  Patient will perform bathing with supervision/set-up within 7 day(s). 4.  Patient will perform toilet transfers with supervision/set-up within 7 day(s). 5.  Patient will perform all aspects of toileting with supervision/set-up within 7 day(s). Outcome: Progressing Towards Goal     OCCUPATIONAL THERAPY EVALUATION  Patient: Jerilynn Castleman (87 y.o. male)  Date: 8/1/2019  Primary Diagnosis: Fall [W19. XXXA]  Fall [W19. XXXA]  Fall [W19. XXXA]  Procedure(s) (LRB):  LEFT AND RIGHT HEART CATH / CORONARY ANGIOGRAPHY (N/A) 1 Day Post-Op   Precautions:   Fall    ASSESSMENT  Based on the objective data described below, the patient presents with poor B hip flexion, impacting pt's ability to get OOB, sit to stand, standing balance. Pt deconditioned, needing mod A x 2 for supine to sit and max A x 2 to stand with max verbal cues for anterior weight shifting. Once pt able to stabilize center of gravity over base of support, pt able to mobilize with CGA to min A x 2.  He is needing max A x 2 for transfers, ADLs and presents as a fall risk. Current Level of Function Impacting Discharge (ADLs/self-care): max A x 2 for sit <> stand, fall risk, poor B hip flexion, hx B THR    Functional Outcome Measure: The patient scored 45/100 on the barthel index outcome measure which is indicative of 55% decline in functional for ADLs. Other factors to consider for discharge: lives alone     Patient will benefit from skilled therapy intervention to address the above noted impairments. PLAN :  Recommendations and Planned Interventions: self care training, functional mobility training, balance training, therapeutic activities, patient education, home safety training and family training/education    Frequency/Duration: Patient will be followed by occupational therapy 4 times a week to address goals. Recommendation for discharge: (in order for the patient to meet his/her long term goals)  Occupational therapy at least 2 days/week in the home AND ensure assist and/or supervision for safety with meals, house keeping     This discharge recommendation:  Has not yet been discussed the attending provider and/or case management    Equipment recommendations for successful discharge (if) home: none has all needed DME       SUBJECTIVE:   Patient stated I need help getting out of bed.     OBJECTIVE DATA SUMMARY:   HISTORY:   Past Medical History:   Diagnosis Date    Cataracts, bilateral     Chronic pain     Diabetes ([address] 2/2/2011    Heart failure ([facility] [address]     Hypertension      Past Surgical History:   Procedure Laterality Date    HX HERNIA REPAIR      HX ORTHOPAEDIC      bilat hip replacement    HX PROSTATECTOMY         Home Situation  Home Environment: Private residence  # Steps to Enter: 1  Rails to Enter: No  One/Two Story Residence: One story  Living Alone: Yes  Support Systems: Family member(s)  Patient Expects to be Discharged to[de-identified] Private residence  Current DME Used/Available at Home: Brooklynn Nixon rollator, Cane, straight(recliner that assists to stand, call pendent, grab bars bath)  Tub or Shower Type: Tub/Shower combination    Hand dominance: Right    EXAMINATION OF PERFORMANCE DEFICITS:  Cognitive/Behavioral Status:  Neurologic State: Alert  Orientation Level: Oriented X4  Cognition: Appropriate decision making; Appropriate safety awareness  Perception: Cues to maintain midline in standing; Tactile;Verbal  Perseveration: No perseveration noted  Safety/Judgement: Awareness of environment; Fall prevention    Skin: intact    Edema: non enoted    Hearing: Auditory  Auditory Impairment: None    Vision/Perceptual:    Tracking: (blind R eye)                                Range of Motion:    AROM: Generally decreased, functional                         Strength:    Strength: Generally decreased, functional                Coordination:     Fine Motor Skills-Upper: Left Intact; Right Intact    Gross Motor Skills-Upper: Left Intact; Right Intact    Tone & Sensation:     Sensation: Intact                      Balance:  Sitting: Impaired; Without support  Sitting - Static: Fair (occasional)  Sitting - Dynamic: Fair (occasional)  Standing: Impaired; With support  Standing - Static: Fair  Standing - Dynamic : Fair    Functional Mobility and Transfers for ADLs:  Bed Mobility:  Supine to Sit: Moderate assistance;Assist x2  Sit to Supine: Moderate assistance;Assist x2  Scooting: Moderate assistance;Assist x2    Transfers:  Sit to Stand: Assist x2;Maximum assistance  Stand to Sit: Assist x2; Moderate assistance  Toilet Transfer : Moderate assistance;Maximum assistance;Assist x2(need BSC, inferred by transfer from EOB)    ADL Assessment:  Feeding: Independent    Oral Facial Hygiene/Grooming: Minimum assistance    Bathing: Moderate assistance;Maximum assistance    Upper Body Dressing: Minimum assistance    Lower Body Dressing: Moderate assistance;Maximum assistance(without use of his baseline AE)    Toileting:  Moderate assistance ADL Intervention and task modifications:                                     Cognitive Retraining  Safety/Judgement: Awareness of environment; Fall prevention      Functional Measure:  Barthel Index:    Bathin  Bladder: 5  Bowels: 5  Groomin  Dressin  Feeding: 10  Mobility: 0  Stairs: 0  Toilet Use: 5  Transfer (Bed to Chair and Back): 10  Total: 45/100        Percentage of impairment   0%   1-19%   20-39%   40-59%   60-79%   80-99%   100%   Barthel Score 0-100 100 99-80 79-60 59-40 20-39 1-19   0     The Barthel ADL Index: Guidelines  1. The index should be used as a record of what a patient does, not as a record of what a patient could do. 2. The main aim is to establish degree of independence from any help, physical or verbal, however minor and for whatever reason. 3. The need for supervision renders the patient not independent. 4. A patient's performance should be established using the best available evidence. Asking the patient, friends/relatives and nurses are the usual sources, but direct observation and common sense are also important. However direct testing is not needed. 5. Usually the patient's performance over the preceding 24-48 hours is important, but occasionally longer periods will be relevant. 6. Middle categories imply that the patient supplies over 50 per cent of the effort. 7. Use of aids to be independent is allowed. Morteza Manzanares., Barthel, DJULIO. (1916). Functional evaluation: the Barthel Index. [facility] (14)2. KAVITHA Steward, Aashish See., [facility] [place], [address] [place] [address] (). Measuring the change indisability after inpatient rehabilitation; comparison of the responsiveness of the Barthel Index and Functional [place] Measure. Journal of Neurology, Neurosurgery, and Psychiatry, 66(4), 709-745. Sowmya Gomez, N.J.A, STEVE Mckenzie, & Disha Guerra MVIRGEN. (2004.) Assessment of post-stroke quality of life in cost-effectiveness studies:  The usefulness of the Barthel Index and the EuroQoL-5D. Quality of Life Research, 15, 840-69         Occupational Therapy Evaluation Charge Determination   History Examination Decision-Making   MEDIUM Complexity : Expanded review of history including physical, cognitive and psychosocial  history  MEDIUM Complexity : 3-5 performance deficits relating to physical, cognitive , or psychosocial skils that result in activity limitations and / or participation restrictions MEDIUM Complexity : Patient may present with comorbidities that affect occupational performnce. Miniml to moderate modification of tasks or assistance (eg, physical or verbal ) with assesment(s) is necessary to enable patient to complete evaluation       Based on the above components, the patient evaluation is determined to be of the following complexity level: MEDIUM  Pain Rating:  None reported    Activity Tolerance:   Good- O2 stable on room air with activity. Please refer to the flowsheet for vital signs taken during this treatment. After treatment patient left in no apparent distress:    Supine in bed, Call bell within reach and Side rails x 3    COMMUNICATION/EDUCATION:   The patients plan of care was discussed with: Registered Nurse. Patient/family have participated as able in goal setting and plan of care. This patients plan of care is appropriate for delegation to hospitals.     Thank you for this referral.  Katherine Méndez OT  Time Calculation: 29 mins

## 2019-08-01 NOTE — PROGRESS NOTES
TRANSFER - IN REPORT:    Verbal report received from Joanna(name) on [place] D [place]  being received from [facility]) for routine progression of care      Report consisted of patients Situation, Background, Assessment and   Recommendations(SBAR). Information from the following report(s) SBAR, Kardex, Procedure Summary, Intake/Output, MAR, Recent Results and Cardiac Rhythm SB, NSR was reviewed with the receiving nurse. Opportunity for questions and clarification was provided.

## 2019-08-01 NOTE — PROGRESS NOTES
Problem: Mobility Impaired (Adult and Pediatric)  Goal: *Acute Goals and Plan of Care (Insert Text)  Description  Physical Therapy Goals  Initiated 8/1/2019  1. Patient will move from supine to sit and sit to supine , scoot up and down and roll side to side in bed with minimal assistance/contact guard assist within 7 day(s). 2.  Patient will transfer from bed to chair and chair to bed once in standing with modified independence using the least restrictive device within 7 day(s). 3.  Patient will perform sit to stand with modified independence from elevated seat height within 7 day(s). 4.  Patient will ambulate with modified independence for 80 feet with the least restrictive device within 7 day(s). 5.  Patient will ascend/descend one stairs with no handrail(s), uses his walker with modified independence within 7 day(s). 6.  Patient will participate in a six minute walk test within 48 hours prior to discharge. Outcome: Progressing Towards Goal   PHYSICAL THERAPY EVALUATION  Patient: Phani Mcelroy (27 y.o. male)  Date: 8/1/2019  Primary Diagnosis: Fall [W19. XXXA]  Fall [W19. XXXA]  Fall [W19. XXXA]  Procedure(s) (LRB):  LEFT AND RIGHT HEART CATH / CORONARY ANGIOGRAPHY (N/A) 1 Day Post-Op   Precautions:   Fall    ASSESSMENT :  Based on the objective data described below, the patient presents with impaired sitting balance (tends toward slow posterior lean), impaired standing balance (retropulsion), and need for assist X 2 for sit <>stand. Attempted to stand him by myself and unable to do so. He has decreased flexion ROM in bilateral hips and knees that negatively impact sit <>stand. Once standing with walker support he was able to amb 8 feet with min assist X 2. He was admitted after a ground level fall at home.      Per pt: he lives alone, is mod I using a rollator, has family who check in on him, has hired help (mostly housekeeping and meal prep) T,W, and TH for four hours each day, was discharged back to home about one week ago from rehab at a SNF, had not fallen (prior to this fall) in the last 12 months, wears a call system pendant but did not use it during this fall, and does not use home 02. He sleeps in a lift chair. Disposition depending on progress, but he remains highly at risk for additional falls at this time. Vitals:       08/01/19 1053 08/01/19 1058 08/01/19 1108   BP:   136/78 168/83 148/77   BP 1 Location:   Left arm Left arm Left arm   BP Patient Position:   Supine;Pre-activity Sitting Sitting;Post activity   Pulse:   77 96 82   Resp:           Temp:           SpO2: on 2 liters of 02.   95% 93% 94%                       Patient will benefit from skilled intervention to address the above impairments. Patients rehabilitation potential is considered to be Good  Factors which may influence rehabilitation potential include:   ? None noted  ? Mental ability/status  ? Medical condition  ? Home/family situation and support systems  ? Safety awareness  ? Pain tolerance/management  ? Other: ROM hips and knees     PLAN :  Recommendations and Planned Interventions:  ?           Bed Mobility Training             ? Neuromuscular Re-Education  ? Transfer Training                   ? Orthotic/Prosthetic Training  ? Gait Training                         ? Modalities  ? Therapeutic Exercises           ? Edema Management/Control  ? Therapeutic Activities            ? Patient and Family Training/Education  ? Other (comment):    Frequency/Duration: Patient will be followed by physical therapy  5 times a week to address goals.   Discharge Recommendations: Rehab, [address] (for rehab, preferred) and To Be Determined  Further Equipment Recommendations for Discharge: none      SUBJECTIVE:   Patient stated I think that chair at home has made me weak, referring to sit to stand chair    OBJECTIVE DATA SUMMARY:   Consult receiving, chart reviewed, pt cleared by nursing  HISTORY:    Past Medical History:   Diagnosis Date    Cataracts, bilateral     Chronic pain     Diabetes ([facility] [address] 2/2/2011    Heart failure ([facility] [address]     Hypertension      Past Surgical History:   Procedure Laterality Date    HX HERNIA REPAIR      HX ORTHOPAEDIC      bilat hip replacement    HX PROSTATECTOMY       Prior Level of Function/Home Situation: Per pt: he lives alone, had family who check in on him, has hired help (mostly housekeeping and meal prep) T,W, and TH for four hours each day, was discharged back to home about one week ago from rehab at a SNF, had not fallen (prior to this fall) in the last 12 months, wears a call system pendant but did not use it during this fall, and does not use home 02. He sleeps in a lift chair. Personal factors and/or comorbidities impacting plan of care: Lives alone and see medical history    Home Situation  Home Environment: Private residence  # Steps to Enter: 1  Rails to Enter: No  One/Two Story Residence: One story  Living Alone: Yes  Support Systems: Family member(s)  Patient Expects to be Discharged to[de-identified] Private residence  Current DME Used/Available at Home: Emerald Mcardle, rollator, Trudell Organ, straight(recliner that assists to stand, call pendent, grab bars bathroom)    EXAMINATION/PRESENTATION/DECISION MAKING:   Critical Behavior:  Neurologic State: Alert  Orientation Level: Oriented X4  Cognition: Appropriate decision making, Appropriate safety awareness     Hearing:   Auditory  Auditory Impairment: None  Skin:  refer to MD and nursing notes  Edema: refer to MD and nursing notes  Range Of Motion:  AROM: Generally decreased, functional                       Strength:    Strength: Generally decreased, functional                    Tone & Sensation:                  Sensation: Intact               Coordination:     Vision:      Functional Mobility:  Bed Mobility: Supine to Sit: Moderate assistance        Transfers:  Sit to Stand: Assist x2;Maximum assistance  Stand to Sit: Assist x2; Moderate assistance                       Balance:   Sitting: Impaired; Without support  Sitting - Static: Fair (occasional)  Sitting - Dynamic: Fair (occasional)  Standing: Impaired; With support  Standing - Static: Fair  Standing - Dynamic : Fair  Ambulation/Gait Training:  Distance (ft): 8 Feet (ft)  Assistive Device: Gait belt;Walker, rolling  Ambulation - Level of Assistance: Minimal assistance;Assist x2        Gait Abnormalities: Decreased step clearance(posterior lean)        Base of Support: Narrowed; Center of gravity altered     Speed/Seema: Slow;Pace decreased (<100 feet/min)  Step Length: Left shortened;Right shortened                     Stairs: Therapeutic Exercises:       Functional Measure:  Timed up and go:    Timed Get Up And Go Test: 0(pt unable)       < than 10 seconds=Normal  Greater then 13.5 seconds (in elderly)=Increased fall risk   Clark Patel [place] M. Predicting the probability for falls in community dwelling older adults using the Timed Up and Go Test. Phys Ther. 2000;80:896-903. Physical Therapy Evaluation Charge Determination   History Examination Presentation Decision-Making   HIGH Complexity :3+ comorbidities / personal factors will impact the outcome/ POC  LOW Complexity : 1-2 Standardized tests and measures addressing body structure, function, activity limitation and / or participation in recreation  LOW Complexity : Stable, uncomplicated  LOW Complexity : FOTO score of       Based on the above components, the patient evaluation is determined to be of the following complexity level: LOW     Pain:  Pain Scale 1: Numeric (0 - 10)  Pain Intensity 1: 0              Activity Tolerance:   See assessment above  Please refer to the flowsheet for vital signs taken during this treatment.   After treatment:   ?         Patient left in no apparent distress sitting up in chair  ? Patient left in no apparent distress in bed  ? Call bell left within reach  ? Nursing notified  ? Caregiver present  ? Bed alarm activated    COMMUNICATION/EDUCATION:   The patients plan of care was discussed with: Registered Nurse. ?         Fall prevention education was provided and the patient/caregiver indicated understanding. ? Patient/family have participated as able in goal setting and plan of care. ?         Patient/family agree to work toward stated goals and plan of care. ?         Patient understands intent and goals of therapy, but is neutral about his/her participation. ? Patient is unable to participate in goal setting and plan of care.     Thank you for this referral.  Mayra Araya   Time Calculation: 34 mins

## 2019-08-01 NOTE — PROGRESS NOTES
A Spiritual Care Partner Volunteer visited patient in Rm 400 on 8/01/2019.   Documented by:  Sheryle Stapler, Chaplain, MDiv, MS, [facility]  [phone])

## 2019-08-01 NOTE — PROGRESS NOTES
Hospitalist Progress Note          Jaylon Ballard MD  Please call  and page for questions. Call physician on-call through the  7pm-7am    Daily Progress Note: 8/1/2019    Primary care provider:Georgia Castillo MD    Date of admission: 7/29/2019  2:47 PM    Admission summery and hospital course:  40-year-old with past medical history of chronic pain, diabetes, bilateral cataracts, heart failure, and aortic stenosis, presenting to the hospital because of unwitnessed fall.  Since 7 a.m. in the morning, he had a fall and was lying on the floor and later on at 2 p.m., his granddaughter noticed that, called the EMS where his blood sugar was 68 though he was awake, and that was the reason he was brought to the hospital.  According to him, his knee was hurting and it gave away and that was his reason for fall. He does have a history of arthritis for the same.  Of note, he was recently admitted in 06/2019 for aortic stenosis, pulmonary edema, CKD stage III, had blood transfused.  EGD and colonoscopy could not be done because he has severe aortic stenosis and balloon aortic valvuloplasty was also not done because his kidney function is getting worse. Dominga Garcia was sent to the rehabilitation from there, but then he went home just for a weekend, fell and came back to the hospital.    Subjective:   Patient said he is feeling OK. Assessment/Plan:   History of severe aortic stenosis, balloon aortic valvuloplasty postponed in the past.  Cardiac cath on 07/31 to evaluate for TAVR. Report to follow. Appreciated Cardiology consult.      Hypertension:   BP is elevated and uncontrolled. Improving now. Will monitor with Hydralazine as PRN. May need amlodipine added.      Fall.  Recurrent. Probably due to age related weakness and medical issue including anemia, bradycardia and aortic stenosis. Continue Physical Therapy/Occupational Therapy. Continue telemetry for now.    He has fallen 3 times in the last 1 week at home. He went to home from rehab on .   He might need long-term care at this point in time, only a granddaughter to take care of him.     Hypoglycemia, POA. Blood glucose is reasonable now. Continue to monitor.      Troponin of 0.14. Troponin is stabilized.  patient has no CP, SOB and palpitation. TSH normal.  EKG without significant finding.      Chronic kidney disease stage III.  Kidney function is improving.      Anemia.    Esophagogastroduodenoscopy and colonoscopy not done last time due to high risk  His hemoglobin is much better at 11 at this time.     See orders for other plans. VTE prophylaxis: SCD  Code status: Full  Discussed plan of care with Patient/Family and Nurse. Pre-admission lived at home. Discharge planning: pending. Patient might need SNF/Rehab. Cardiology to make further regarding TAVR. I have tigertext Dr Shelly Turcios. : I spoke with Dr Shelly Turcios. He would like to bring the patient back for TAVR due to repeated contrast needed for TAVR. Patient will be discharged tomorrow to rehab and or home with [facility] as per PT and OT suggestion. Review of Systems:     Review of Systems:  Symptom  Y/N  Comments   Symptom  Y/N  Comments    Fever/Chills   n   Chest Pain  n    Poor Appetite   n   Edema  n     Cough  n   Abdominal Pain  n     Sputum  n   Joint Pain      SOB/JOSUE  n   Pruritis/Rash      Nausea/vomit     Tolerating PT/OT      Diarrhea     Tolerating Diet      Constipation     Other      Could not obtain due to:         Objective:   Physical Exam:     Visit Vitals  /75 (BP 1 Location: Left arm, BP Patient Position: At rest)   Pulse 71   Temp 97.4 °F (36.3 °C)   Resp 18   Ht 5' 7\" (1.702 m)   Wt 81.2 kg (179 lb 0.2 oz)   SpO2 97%   BMI 28.04 kg/m²    O2 Flow Rate (L/min): 2 l/min O2 Device: Nasal cannula    Temp (24hrs), Av.6 °F (36.4 °C), Min:97.4 °F (36.3 °C), Max:98 °F (36.7 °C)    No intake/output data recorded.    1901 -  0700  In: -   Out: 740 [Urine:740]    General:  Alert, cooperative, no distress, appears stated age. Lungs:   Clear to auscultation bilaterally. Heart:  Regular rate and rhythm, S1, S2 normal, Systolic murmur present.    Abdomen:   Soft, non-tender. Bowel sounds normal.    Extremities: Extremities normal, atraumatic, no cyanosis or edema. Pulses: 2+ and symmetric all extremities. Skin: Skin color, texture, turgor normal. No rashes or lesions   Neurologic: Patient is talking and communicating appropriately. Patient follows the command well. Patient is moving all his extremities         Data Review:       Recent Days:  Recent Labs     08/01/19  0318 07/31/19  0310 07/29/19  1619   WBC 9.4 6.8 10.9   HGB 10.3* 9.5* 11.0*   HCT 33.1* 30.6* 36.8    294 342     Recent Labs     08/01/19  0318 07/31/19  0310 07/30/19  0503 07/29/19  1619   * 148* 149* 148*   K 3.7 3.6 3.6 3.9   * 118* 119* 115*   CO2 25 24 24 21   GLU 93 80 102* 102*   BUN 17 20 31* 31*   CREA 1.11 1.09 1.16 1.25   CA 8.5 8.2* 8.8 9.2   MG  --   --   --  2.2   ALB 2.5* 2.6* 2.7* 3.1*   SGOT 17 17 26 35   ALT 20 17 17 20     No results for input(s): PH, PCO2, PO2, HCO3, FIO2 in the last 72 hours.     24 Hour Results:  Recent Results (from the past 24 hour(s))   GLUCOSE, POC    Collection Time: 07/31/19  4:36 PM   Result Value Ref Range    Glucose (POC) 122 (H) 65 - 100 mg/dL    Performed by Thao Mandujano, POC    Collection Time: 07/31/19  9:43 PM   Result Value Ref Range    Glucose (POC) 102 (H) 65 - 100 mg/dL    Performed by Hung Anne    CBC WITH AUTOMATED DIFF    Collection Time: 08/01/19  3:18 AM   Result Value Ref Range    WBC 9.4 4.1 - 11.1 K/uL    RBC 3.56 (L) 4.10 - 5.70 M/uL    HGB 10.3 (L) 12.1 - 17.0 g/dL    HCT 33.1 (L) 36.6 - 50.3 %    MCV 93.0 80.0 - 99.0 FL    MCH 28.9 26.0 - 34.0 PG    MCHC 31.1 30.0 - 36.5 g/dL    RDW 17.1 (H) 11.5 - 14.5 %    PLATELET 364 087 - 739 K/uL    MPV 10.3 8.9 - 12.9 FL    NRBC 0.0 0  WBC    ABSOLUTE NRBC 0.00 0.00 - 0.01 K/uL    NEUTROPHILS 75 32 - 75 %    LYMPHOCYTES 11 (L) 12 - 49 %    MONOCYTES 10 5 - 13 %    EOSINOPHILS 4 0 - 7 %    BASOPHILS 0 0 - 1 %    IMMATURE GRANULOCYTES 0 0.0 - 0.5 %    ABS. NEUTROPHILS 7.0 1.8 - 8.0 K/UL    ABS. LYMPHOCYTES 1.0 0.8 - 3.5 K/UL    ABS. MONOCYTES 0.9 0.0 - 1.0 K/UL    ABS. EOSINOPHILS 0.4 0.0 - 0.4 K/UL    ABS. BASOPHILS 0.0 0.0 - 0.1 K/UL    ABS. IMM. GRANS. 0.0 0.00 - 0.04 K/UL    DF AUTOMATED     METABOLIC PANEL, COMPREHENSIVE    Collection Time: 08/01/19  3:18 AM   Result Value Ref Range    Sodium 148 (H) 136 - 145 mmol/L    Potassium 3.7 3.5 - 5.1 mmol/L    Chloride 117 (H) 97 - 108 mmol/L    CO2 25 21 - 32 mmol/L    Anion gap 6 5 - 15 mmol/L    Glucose 93 65 - 100 mg/dL    BUN 17 6 - 20 MG/DL    Creatinine 1.11 0.70 - 1.30 MG/DL    BUN/Creatinine ratio 15 12 - 20      GFR est AA >60 >60 ml/min/1.73m2    GFR est non-AA >60 >60 ml/min/1.73m2    Calcium 8.5 8.5 - 10.1 MG/DL    Bilirubin, total 0.5 0.2 - 1.0 MG/DL    ALT (SGPT) 20 12 - 78 U/L    AST (SGOT) 17 15 - 37 U/L    Alk.  phosphatase 85 45 - 117 U/L    Protein, total 6.1 (L) 6.4 - 8.2 g/dL    Albumin 2.5 (L) 3.5 - 5.0 g/dL    Globulin 3.6 2.0 - 4.0 g/dL    A-G Ratio 0.7 (L) 1.1 - 2.2     GLUCOSE, POC    Collection Time: 08/01/19  6:30 AM   Result Value Ref Range    Glucose (POC) 108 (H) 65 - 100 mg/dL    Performed by Rosita Lacy    GLUCOSE, POC    Collection Time: 08/01/19 11:38 AM   Result Value Ref Range    Glucose (POC) 112 (H) 65 - 100 mg/dL    Performed by Cindy Nunn        Problem List:  Problem List as of 8/1/2019 Date Reviewed: 5/21/2019          Codes Class Noted - Resolved    [facility]) [facility] ICD-10-CM: N17.9  ICD-9-CM: 584.9  6/19/2019 - Present        Severe anemia ICD-10-CM: D64.9  ICD-9-CM: 285.9  6/19/2019 - Present        Aortic stenosis ICD-10-CM: I35.0  ICD-9-CM: 424.1  6/17/2019 - Present        GIB (gastrointestinal bleeding) ICD-10-CM: K92.2  ICD-9-CM: 578.9  6/17/2019 - Present        Dehydration ICD-10-CM: E86.0  ICD-9-CM: 276.51  6/11/2019 - Present        Acute hypernatremia ICD-10-CM: E87.0  ICD-9-CM: 276.0  6/11/2019 - Present        Abnormal EKG ICD-10-CM: R94.31  ICD-9-CM: 794.31  6/11/2019 - Present        CKD (chronic kidney disease) stage 3, GFR 30-59 ml/min (HCC) ICD-10-CM: N18.3  ICD-9-CM: 585.3  2/7/2019 - Present        Anemia ICD-10-CM: D64.9  ICD-9-CM: 285.9  2/7/2019 - Present        Severe aortic stenosis ICD-10-CM: I35.0  ICD-9-CM: 424.1  12/21/2015 - Present        Fall ICD-10-CM: Aline Plough. Cathye Spar  ICD-9-CM: E888.9  12/21/2015 - Present        Rhabdomyolysis ICD-10-CM: M62.82  ICD-9-CM: 728.88  12/19/2015 - Present        Cellulitis and abscess of leg, except foot ICD-10-CM: L03.119, L02.419  ICD-9-CM: 682.6  2/2/2011 - Present        Diabetes ([facility] ICD-10-CM: E11.9  ICD-9-CM: 250.00  2/2/2011 - Present        Essential hypertension, benign (Chronic) ICD-10-CM: I10  ICD-9-CM: 401.1  2/2/2011 - Present        Hip joint replacement (Chronic) ICD-9-CM: V43.64  2/2/2011 - Present        Morbidly obese (HCC) (Chronic) ICD-10-CM: E66.01  ICD-9-CM: 278.01  2/2/2011 - Present        RESOLVED: Elevated troponin I level ICD-10-CM: R74.8  ICD-9-CM: 790.6  12/19/2015 - 12/21/2015        RESOLVED: Encephalopathy ICD-10-CM: G93.40  ICD-9-CM: 348.30  12/19/2015 - 12/21/2015        RESOLVED: Leukocytosis ICD-10-CM: H63.578  ICD-9-CM: 288.60  12/19/2015 - 12/21/2015              Medications reviewed  Current Facility-Administered Medications   Medication Dose Route Frequency    hydrALAZINE (APRESOLINE) 20 mg/mL injection 10 mg  10 mg IntraVENous Q6H PRN    therapeutic multivitamin (THERAGRAN) tablet 1 Tab  1 Tab Oral DAILY    timolol (TIMOPTIC) 0.5 % ophthalmic solution 1 Drop  1 Drop Left Eye BID    sodium chloride (NS) flush 5-40 mL  5-40 mL IntraVENous Q8H    sodium chloride (NS) flush 5-40 mL  5-40 mL IntraVENous PRN    acetaminophen (TYLENOL) tablet 650 mg  650 mg Oral Q4H PRN    ondansetron (ZOFRAN ODT) tablet 4 mg  4 mg Oral Q4H PRN    insulin lispro (HUMALOG) injection   SubCUTAneous AC&HS    glucose chewable tablet 16 g  4 Tab Oral PRN    dextrose (D50W) injection syrg 12.5-25 g  12.5-25 g IntraVENous PRN    glucagon (GLUCAGEN) injection 1 mg  1 mg IntraMUSCular PRN       Care Plan discussed with: Patient/Family and Nurse    Total time spent with patient: 30 minutes.     Dayna Kapoor MD

## 2019-08-01 NOTE — PROGRESS NOTES
Orders received, chart reviewed and patient evaluated by physical therapy. Recommend patient to discharge to home with 24/7 assist or rehab pending progress with skilled acute care physical therapy. Recommend with nursing patient to complete as able in order to maintain strength, endurance and independence: OOB to chair 3x/day with assist X 2 using a rolling walker and ambulating with assist X 2 and using a rolling walker. Thank you for your assistance. Full evaluation to follow.  Zandra Lozano, PT        Vitals:     08/01/19 1053 08/01/19 1058 08/01/19 1108   BP:  136/78 168/83 148/77   BP 1 Location:  Left arm Left arm Left arm   BP Patient Position:  Supine;Pre-activity Sitting Sitting;Post activity   Pulse:  77 96 82   Resp:       Temp:       SpO2: on 2 liters of 02.  95% 93% 94%

## 2019-08-01 NOTE — PROGRESS NOTES
Problem: Falls - Risk of  Goal: *Absence of Falls  Description  Document Beba Ferraro Fall Risk and appropriate interventions in the flowsheet.   Outcome: Progressing Towards Goal  Note:   Fall Risk Interventions:  Mobility Interventions: Patient to call before getting OOB    Mentation Interventions: Adequate sleep, hydration, pain control, Door open when patient unattended, Evaluate medications/consider consulting pharmacy, Increase mobility, More frequent rounding, Reorient patient, Room close to nurse's station, Toileting rounds, Update white board    Medication Interventions: Evaluate medications/consider consulting pharmacy, Patient to call before getting OOB, Teach patient to arise slowly    Elimination Interventions: Call light in reach, Patient to call for help with toileting needs, Stay With Me (per policy), Toilet paper/wipes in reach, Toileting schedule/hourly rounds, Urinal in reach    History of Falls Interventions: Door open when patient unattended, Evaluate medications/consider consulting pharmacy, Investigate reason for fall, Utilize gait belt for transfer/ambulation         Problem: Heart Failure: Day 1  Goal: *Oxygen saturation within defined limits  Outcome: Progressing Towards Goal  Goal: *Hemodynamically stable  Outcome: Progressing Towards Goal  Goal: *Anxiety reduced or absent  Outcome: Progressing Towards Goal

## 2019-08-01 NOTE — CONSULTS
Patient: Rubin Shahid   Age: 80 y.o. Patient Care Team:  Indiana Salter MD as PCP - General (Internal Medicine)    PCP: Indiana Salter MD    Cardiologist: Smita Tucker    Diagnosis/Reason for Consultation: The primary encounter diagnosis was Fall, initial encounter. Diagnoses of Weakness, Elevated troponin, and Contusion of left knee, initial encounter were also pertinent to this visit. Problem List:   Patient Active Problem List   Diagnosis Code    Cellulitis and abscess of leg, except foot L03.119, L02.419    Diabetes ([facility] [address] E11.9    Essential hypertension, benign I10    Hip joint replacement     Morbidly obese ([facility] [address] E66.01    Rhabdomyolysis M62.82    Severe aortic stenosis I35.0    Fall W19. [facility]    CKD (chronic kidney disease) stage 3, GFR 30-59 ml/min ([facility]) N18.3    Anemia D64.9    Dehydration E86.0    Acute hypernatremia E87.0    Abnormal EKG R94.31    Aortic stenosis I35.0    GIB (gastrointestinal bleeding) K92.2    KEIKO (acute kidney injury) ([facility] [address] N17.9    Severe anemia D64.9      HPI: 80 y.o. Black male with PMHx of AS, HTN, CKD III (Cr 1.3-1.7 baseline), DM II (oral regimen), CHF, CAD s/p PCI (2014), Iron deficient/B12 deficient anemia, s/p B THR (2000), B cataract & unexplained wt loss that is referred to the [address] [place] [address] by Dr. Smita Tucker for interventional evaluation of AS. Mr. Vera Gayle is here for his second admission this calendar yr. He was previously admitted in June with anemia that required a transfusion. This was his first need for transfusion and had been evaluated by Dr. Bismark Garcia (heme/onc) and was found to have profound iron and B12 deficiency but there was concern for GI/neoplasm source. Occult stool was negative for blood but an invasive GI w/u was not completed d/t his severe AS. He has not had any recurrent anemia.  During that hospitalization there was a tentative plan for BAV in order to safely complete GI eval but he experienced KEIKO with Cr up to 1.9 and valvular intervention was deferred at that time. He was discharged to inpt rehab at [facility] on 6/22/2019 and was ultimately back home on 7/22/2019 but was readmitted on 7/29/2019 for a fall. Re: this fall, the pt reportedly slipped on his kitchen floor as his feet were wet and was unable to get himself up. He was lying on the floor for approximately 7 hrs before he was either found by his granddaughter or his pushed his life alert button. He is a poor historian and the details are unclear and slightly changing. He also reports several other falls since being home but denies that any of these were d/t lightheadedness/dizziness or syncope. On admission, he had slight bump in his trop and CK and was gently resucitated with IVF. He underwent cardiac catheterization yesterday that did not reportedly reveal any new CAD that requires intervention. Overall, Mr. Narendra Duncan is quite debilitated. He lives alone but has a lift chair that he sits/sleeps in as well as a number of physical adaptive equipment around his home such as a potty lift chair, reacher/grabber to  things/put on socks & wall/bathroom handles. 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 His L knee is also problematic and therefore his stability and gait, even with a rollator, is highly suspect at best with assistance of two clinicians. Consequently, Mr. Narendra Duncan is very sedentary and is unable to exert himself in any way. He reports independence with ADLs and cooking and has some family members that check on him and grocery shop for him. He denies any SOB/JOSUE, palpitations, CP, chest tightness, lightheadedness, or syncope. He also denies LE edema but it is present. He mentions a L ankle/LLE wound of questionable duration. It has been evaluated by wound care and it is not noted to be infected.     When questioned about his prostatectomy hx he denies having that done or hx of prostate cancer or any cancer at all. It was noted during his last admission that he had an unexplained wt loss of 50 lbs over 2 yrs and there was concern for malignancy but w/u was not complete. He is up almost 10 lbs since his discharge in June. Mr. Amy Arce is  and lives alone. NYHA Classification: I-II   Class I (Mild): No limitation of physical activity. Ordinary physical activity does not cause undue fatigue, palpitation, or dyspnea. Class II (Mild): Slight limitation of physical activity. Comfortable at rest, but ordinary physical activity results in fatigue, palpitation, or dyspnea. Angina Classification: 0   Class 0: No symptoms     Past Medical History:   Diagnosis Date    Cataracts, bilateral     Chronic pain     Diabetes ([facility] [address] 2/2/2011    Heart failure ([facility] [address]     Hypertension      Endocarditis: no  Pulmonary HTN: yes Greater than 2/3 systemic: No  Immunocompromised/Steroids: no  Heart Failure Admission w/in past year: no  Heart Failure Admission w/in past 2 weeks: no  Liver Dz/Cirrhosis: no  If yes, MELD score: N/A    Past Surgical History:   Procedure Laterality Date    HX HERNIA REPAIR      HX ORTHOPAEDIC      bilat hip replacement    HX PROSTATECTOMY        Social History     Tobacco Use    Smoking status: Never Smoker    Smokeless tobacco: Never Used   Substance Use Topics    Alcohol use: No      History reviewed. No pertinent family history. Prior to Admission medications    Medication Sig Start Date End Date Taking? Authorizing Provider   multivitamin (ONE A DAY) tablet Take 1 Tab by mouth daily. Yes Provider, Historical   timolol (TIMOPTIC) 0.5 % ophthalmic solution Administer 1 Drop to left eye two (2) times a day.  12/21/15  Yes Camille Opitz, MD       No Known Allergies    Current Medications:   Current Facility-Administered Medications   Medication Dose Route Frequency Provider Last Rate Last Dose    hydrALAZINE (APRESOLINE) 20 mg/mL injection 10 mg  10 mg IntraVENous Q6H PRN Bhetwal, Raleigh Severin, MD   10 mg at 08/01/19 0142    therapeutic multivitamin (THERAGRAN) tablet 1 Tab  1 Tab Oral DAILY Bhetwal, Raleigh Severin, MD   1 Tab at 08/01/19 0905    timolol (TIMOPTIC) 0.5 % ophthalmic solution 1 Drop  1 Drop Left Eye BID Laura Palm MD   1 Drop at 08/01/19 0905    sodium chloride (NS) flush 5-40 mL  5-40 mL IntraVENous Q8H Irwin Flores MD   10 mL at 08/01/19 1412    sodium chloride (NS) flush 5-40 mL  5-40 mL IntraVENous PRN Irwin Flores MD        acetaminophen (TYLENOL) tablet 650 mg  650 mg Oral Q4H PRN Irwin Flores MD        ondansetron (ZOFRAN ODT) tablet 4 mg  4 mg Oral Q4H PRN Irwin Flores MD        insulin lispro (HUMALOG) injection   SubCUTAneous AC&HS Irwin Flores MD   Stopped at 07/29/19 2200    glucose chewable tablet 16 g  4 Tab Oral PRN Irwin Flores MD        dextrose (D50W) injection syrg 12.5-25 g  12.5-25 g IntraVENous PRN Irwin Flores MD   12.5 g at 07/31/19 0630    glucagon (GLUCAGEN) injection 1 mg  1 mg IntraMUSCular PRN Irwin Flores MD           Vitals: Blood pressure 148/75, pulse 71, temperature 97.4 °F (36.3 °C), resp. rate 18, height 5' 7\" (1.702 m), weight 179 lb 0.2 oz (81.2 kg), SpO2 97 %.        BMP:   Lab Results   Component Value Date/Time     (H) 08/01/2019 03:18 AM    K 3.7 08/01/2019 03:18 AM     (H) 08/01/2019 03:18 AM    CO2 25 08/01/2019 03:18 AM    AGAP 6 08/01/2019 03:18 AM    GLU 93 08/01/2019 03:18 AM    BUN 17 08/01/2019 03:18 AM    CREA 1.11 08/01/2019 03:18 AM    GFRAA >60 08/01/2019 03:18 AM    GFRNA >60 08/01/2019 03:18 AM     CMP:   Lab Results   Component Value Date/Time     (H) 08/01/2019 03:18 AM    K 3.7 08/01/2019 03:18 AM     (H) 08/01/2019 03:18 AM    CO2 25 08/01/2019 03:18 AM    AGAP 6 08/01/2019 03:18 AM    GLU 93 08/01/2019 03:18 AM    BUN 17 08/01/2019 03:18 AM    CREA 1.11 08/01/2019 03:18 AM    GFRAA >60 08/01/2019 03:18 AM    GFRNA >60 08/01/2019 03:18 AM    CA 8.5 08/01/2019 03:18 AM    ALB 2.5 (L) 08/01/2019 03:18 AM    TP 6.1 (L) 08/01/2019 03:18 AM    GLOB 3.6 08/01/2019 03:18 AM    AGRAT 0.7 (L) 08/01/2019 03:18 AM    SGOT 17 08/01/2019 03:18 AM    ALT 20 08/01/2019 03:18 AM     CBC:   Lab Results   Component Value Date/Time    WBC 9.4 08/01/2019 03:18 AM    HGB 10.3 (L) 08/01/2019 03:18 AM    HCT 33.1 (L) 08/01/2019 03:18 AM     08/01/2019 03:18 AM     All Cardiac Markers in the last 24 hours: No results found for: CPK, CK, CKMMB, CKMB, RCK3, CKMBT, CKNDX, CKND1, MARTY, TROPT, TROIQ, KAREN, TROPT, TNIPOC, BNP, BNPP  Liver Panel:   Lab Results   Component Value Date/Time    ALB 2.5 (L) 08/01/2019 03:18 AM    TP 6.1 (L) 08/01/2019 03:18 AM    GLOB 3.6 08/01/2019 03:18 AM    AGRAT 0.7 (L) 08/01/2019 03:18 AM    SGOT 17 08/01/2019 03:18 AM    ALT 20 08/01/2019 03:18 AM    AP 85 08/01/2019 03:18 AM     Allergies: has No Known Allergies. Review of Systems: Pertinent Positives per HPI   [x]Total of 13 systems reviewed as follows:  Constitutional: Negative fever, negative chills  Eyes:   Negative for amauroses fugax  ENT:   Negative sore throat,oral absecess  Endocrine Negative for thyroid replacement Rx; goiter  Respiratory:  Negative chronic cough,sputum production  Cards:   Negative for palpitations, lower extremity edema, varicosities, claudication  GI:   Negative for dysphagia, bleeding, nausea, vomiting, diarrhea, and abdominal pain  Genitourinary: Negative for frequency, dysuria  Integument:  Negative for rash and pruritus  Hematologic:  Negative for easy bruising; bleeding dyscarsia  Musculoskel: Negative for muscle weakness inhibiting ambulation  Neurological:  Negative for stroke, TIA, syncope, dizziness  Behavl/Psych: Negative for feelings of anxiety, depression     Cardiovascular Testing:   EKG 7/29/2019: SB 51 bpm. LVH.  No blocks/axis deviations    TTE 7/30/2019: Interpretation Summary · Left Ventricle: Small left ventricle. Increased wall thickness. Hyperdynamic systolic dysfunction. Estimated left ventricular ejection fraction is 66 - 70%. Inconclusive left ventricular diastolic function. · Aortic Valve: Aortic valve sclerosis. Aortic valve mean gradient is 57.3 mmHg. Aortic valve area is 0.8 cm2. Critical aortic valve stenosis is present. · Mitral Valve: Mitral valve thickening. Mild mitral valve regurgitation. · Tricuspid Valve: Mild tricuspid valve regurgitation is present. · Pulmonary Artery: Moderate pulmonary hypertension. Echo Findings     Left Ventricle Small left ventricle. Increased wall thickness. Hyperdynamic systolic function. The estimated ejection fraction is 66 - 70%. There is inconclusive left ventricular diastolic function. Left Atrium Normal cavity size. Right Ventricle Normal cavity size and global systolic function. Right Atrium Normal cavity size. Aortic Valve No regurgitation. Aortic valve sclerosis. Aortic valve peak gradient is 100 mmHg. Aortic valve mean gradient is 57.3 mmHg. Aortic valve area is 0.8 cm2. Aortic valve peak velocity is 4.7 cm/s. There is critical aortic stenosis. Mitral Valve No stenosis. Mitral valve thickening. Mild regurgitation. Tricuspid Valve Normal valve structure and no stenosis. Mild tricuspid valve regurgitation. Moderate pulmonary hypertension. Pulmonic Valve Pulmonic valve not well visualized. Aorta Normal aortic root. Pericardium Normal pericardium and no evidence of pericardial effusion.      Aortic Valve Measurements     Stenosis   Aortic Valve Systolic Peak Velocity 851.7 cm/s      AoV .5 mmHg      Aortic Valve Systolic Mean Gradient 37.1 mmHg      AoV .86 cm      Aortic Valve Area by Continuity of VTI 0.8 cm2      Aortic Valve Area by Continuity of Peak Velocity 0.7 cm2       LVOT   LVOT Peak Velocity 113.76 cm/s      LVOT Peak Gradient 5.2 mmHg      LVOT VTI 33.39 cm      LVOT d 1.98 cm Diastolic Filling/Shunts     Shunt   LVOT .9 ml              L/R Cardiac catheterization 7/31/2019: Coronary report not available at time of this note  Phase: Baseline   Data Systolic Diastolic Mean dP/dt A Wave V Wave   RV Pressures 50 mmHg    15 mmHg     291 mmHg/sec        PA Pressures 48 mmHg    18 mmHg    29 mmHg         LV Pressures 175 mmHg    24 mmHg     1842 mmHg/sec        AO Pressures 130 mmHg    38 mmHg    69 mmHg         RA Pressures   12 mmHg     14 mmHg    13 mmHg      PCW Pressures   17 mmHg     18 mmHg    19 mmHg      Phase: Baseline   Data HR TDCO TDCI Elke CI PVR/SVR TPR/TVR   Cardiac Output Results  3.9 L/min    2.03 L/min/m2    2.86 L/min/m2        Resistance Results     0.21    0.42      Blood Oximetry   PA O2 Sat   64.5 %   Aortic Valve 7/31/19 0849--7/31/19 1007   Date/Time AV SEP AV Area AV Flow AV Index AV Gradient   07/31/19 08:50:33 18.27 sec/beat 0.74 cm2 213.5 cc 0.39 cm2/m2 41.88 mmHg         Physical Exam:  General: Well nourished well groomed elderly gentleman appearing stated age resting comfortably in hospital bed and then participating with PT/OT eval  Neuro: A&OX3. HERNANDEZ. PERRL. Un- Steady assisted gait X 2 and with rollator  Head:Normocephalic. Atraumatic. Symmetrical  Neck: Trachea Midline  Resp: CTA B. No Adv BS/cough/sputum/tachypnea with seated conversation  CV: S1S2 RRR. KADIE V/VI. No JVD/carotid bruits. Pink/warm/dry extremities. 2+ LE peripheral edema  GI:Benign ab. Soft. NT/ND. Active BS  : Voids  Integ: Chronic venous stasis changes in B LE. Lateral lower L leg wound with CDI dressing  Musculo/Skeletal: Limited ROM in all major joints, most notably his hips (he cannot bend/sit at 90 degree angle).  Good muscle tone    Clinic Evaluation:   KCCQ-12: scanned into EMR    5 meter gait: deferred at this time    Myrna Capellan Survey:  [place] Index ADL - 6/6  scanned into EMR     STS 2.9 Risk Score / Predicted 30 day mortality: - calculations scanned into EMR  STS Adult Cardiac Surgery Database Version   2.9 RISK SCORES Procedure: Isolated AVR CALCULATE   Risk of Mortality:  2.867%    Renal Failure:  4.585%    Permanent Stroke:  2.489%    Prolonged Ventilation:  9.134%    DSW Infection:  0.106%    Reoperation:  4.571%    Morbidity or Mortality:  15.013%    Short Length of Stay:  22.381%    Long Length of Stay:  8.377%     Assessment/Plan:   1. AS - Severe and ? Symptomatic. ?syncope contributing to falls but no CHF this admission. Quite limited mobility. At least High Risk Surgical candidate. Not open SAVR candidate. Pt would like to explore TAVR at this time. Needs Gated CTA but cardiac catheterization yesterday, hx of CKD and KEIKO with Cr peak 1.9 during June admission. To obtain CTA on Monday and then make decision about potential TAVR timing. 2. HTN - controlled on hydralzine  3. CKD III (Cr 1.3-1.7 baseline) - below baseline currently. Has had volume resuscitation at admission but cardiac cath < 24 hr ago. To obtain CTA to eval for TAVR access options on Monday per #1. Plan discussed with Dr. Maxim Park & in agreement. 4. Anemia - unclear etiology. Followed by Dr. India Denis (heme/onc) and has profound iron deficiency and B12 deficiency and was receiving tx to include IV iron. Discharged w/ Hgb of 7.6 (6/22/2019). No additional transfusions and admission Hgb 11. Invasive GI eval not completed in June d/t risks > benefits with critical AS. No recurrent bleeding. 5. DM II -Accu checks  6. CHF - not a current issue. CHF in June with anemia and heart strain. No current diuretics. Hydralazine for BP/afterload control  7. CAD s/p PCI (2014) - cath 7/31/2019 and report not complete. Reportedly no new obstructive CAD. Currently no Rx tx   8. ?prostate CA s/p prostatectomy - pt denies hx despite some mention in EMR. CTA will provide clarification  9.  s/p B THR (2000) - frozen hips restricting mobility a great deal. PT/OT rehab efforts greatly appreciated  10.  B cataract - eye gtts. Limited vision and cloudy lens  11. Unexplained Wt loss - identified in June admission that 50lb loss in 2 yrs. Followed by heme/onc and GI (Aby Garcia). Pt w/ severe protein calorie malnutrition with albumin 3 on this admission but up from 2.4 at last admission. Nutrition consulted last admission and good PO intake at that time. Suspect poor nutrition when home alone large contributor. Unclear if malignant etiology as well. CTA will assist with this dx. Pt up ~ 10 lbs today compared with discharge ~ month ago. Suspect routine meals while a rehab for last month helpful. May have some fluid wt today but doubtful 10 lbs worth. 12. Dispo - Unlikely that pt safe to return home alone with current support network/regimen. Incredible fall risk d/t ortho issues even if AS corrected this admission as unclear if syncope contributing. Case management efforts greatly appeciated  13. Further plan/care by Francisca Cage

## 2019-08-02 LAB
ANION GAP SERPL CALC-SCNC: 7 MMOL/L (ref 5–15)
BUN SERPL-MCNC: 16 MG/DL (ref 6–20)
BUN/CREAT SERPL: 15 (ref 12–20)
CALCIUM SERPL-MCNC: 8.6 MG/DL (ref 8.5–10.1)
CHLORIDE SERPL-SCNC: 113 MMOL/L (ref 97–108)
CO2 SERPL-SCNC: 26 MMOL/L (ref 21–32)
CREAT SERPL-MCNC: 1.06 MG/DL (ref 0.7–1.3)
END DIASTOLIC PRESSURE: 24
GLUCOSE BLD STRIP.AUTO-MCNC: 101 MG/DL (ref 65–100)
GLUCOSE BLD STRIP.AUTO-MCNC: 128 MG/DL (ref 65–100)
GLUCOSE BLD STRIP.AUTO-MCNC: 94 MG/DL (ref 65–100)
GLUCOSE SERPL-MCNC: 111 MG/DL (ref 65–100)
POTASSIUM SERPL-SCNC: 3.6 MMOL/L (ref 3.5–5.1)
SERVICE CMNT-IMP: ABNORMAL
SERVICE CMNT-IMP: ABNORMAL
SERVICE CMNT-IMP: NORMAL
SODIUM SERPL-SCNC: 146 MMOL/L (ref 136–145)

## 2019-08-02 PROCEDURE — 97116 GAIT TRAINING THERAPY: CPT

## 2019-08-02 PROCEDURE — 99218 HC RM OBSERVATION: CPT

## 2019-08-02 PROCEDURE — 36415 COLL VENOUS BLD VENIPUNCTURE: CPT

## 2019-08-02 PROCEDURE — 97530 THERAPEUTIC ACTIVITIES: CPT

## 2019-08-02 PROCEDURE — 82962 GLUCOSE BLOOD TEST: CPT

## 2019-08-02 PROCEDURE — 74011250637 HC RX REV CODE- 250/637: Performed by: FAMILY MEDICINE

## 2019-08-02 PROCEDURE — 80048 BASIC METABOLIC PNL TOTAL CA: CPT

## 2019-08-02 RX ADMIN — Medication 10 ML: at 11:58

## 2019-08-02 RX ADMIN — TIMOLOL MALEATE 1 DROP: 5 SOLUTION OPHTHALMIC at 17:42

## 2019-08-02 RX ADMIN — TIMOLOL MALEATE 1 DROP: 5 SOLUTION OPHTHALMIC at 09:29

## 2019-08-02 RX ADMIN — Medication 10 ML: at 06:00

## 2019-08-02 RX ADMIN — Medication 10 ML: at 23:16

## 2019-08-02 RX ADMIN — THERA TABS 1 TABLET: TAB at 09:27

## 2019-08-02 NOTE — PROGRESS NOTES
Patient seen and examined at Dr. Lulu Lazo request - he has severe AS but at present asymptomatic by his report. He is frail with weight loss, and as such would not recommend further workup for TAVR. We can arrange to see him back in valve clinic, and once he becomes/admits to symptoms, then would get CTA as next step.

## 2019-08-02 NOTE — PROGRESS NOTES
Hospitalist Progress Note          Denise Donnelly MD  Please call  and page for questions. Call physician on-call through the  7pm-7am    Daily Progress Note: 8/2/2019    Primary care provider:Manjula Castillo MD    Date of admission: 7/29/2019  2:47 PM    Admission summery and hospital course:  80-year-old with past medical history of chronic pain, diabetes, bilateral cataracts, heart failure, and aortic stenosis, presenting to the hospital because of unwitnessed fall.  Since 7 a.m. in the morning, he had a fall and was lying on the floor and later on at 2 p.m., his granddaughter noticed that, called the EMS where his blood sugar was 68 though he was awake, and that was the reason he was brought to the hospital.  According to him, his knee was hurting and it gave away and that was his reason for fall. He does have a history of arthritis for the same.  Of note, he was recently admitted in 06/2019 for aortic stenosis, pulmonary edema, CKD stage III, had blood transfused.  EGD and colonoscopy could not be done because he has severe aortic stenosis and balloon aortic valvuloplasty was also not done because his kidney function is getting worse. Vicki Estrada was sent to the rehabilitation from there, but then he went home just for a weekend, fell and came back to the hospital.    Subjective:   Patient said he is feeling good and he does not have any issue today. Assessment/Plan:   History of severe aortic stenosis, balloon aortic valvuloplasty postponed in the past.  Cardiac cath on 07/31 to evaluate for TAVR. CT surgery recommending CTA prior to TAVR. Patient will follow up with cardiologist for further planning. Appreciated Cardiology and CT consult.   Patient needs renal status lab test before CTA next week.      Hypertension:   BP is elevated and uncontrolled. Improving now. Will monitor with Hydralazine as PRN. Add amlodipine if needed.       Fall.  Recurrent. Probably due to age related weakness and medical issue including anemia, bradycardia and aortic stenosis.   Continue telemetry for now. He has fallen 3 times in the last 1 week at home. He went to home from rehab on 07/22.   He might need long-term care at this point in time, only a granddaughter to take care of him as home visit. Patient lives by himself. Patient needs SNF prior to going home. Continue Physical Therapy/Occupational Therapy. I spoke with .      Hypoglycemia, POA. Blood glucose is reasonable now. Continue to monitor.      Troponin of 0.14. Troponin is stabilized. Patient has no CP, SOB and palpitation. TSH normal.  EKG without significant finding.      Chronic kidney disease stage III.    Kidney function is improving.      Anemia.    Esophagogastroduodenoscopy and colonoscopy not done last time due to high risk  His hemoglobin is much better at 11 at this time.     See orders for other plans. VTE prophylaxis: SCD  Code status: Full  Discussed plan of care with Patient/Family and Nurse. Pre-admission lived at home. Discharge planning: pending. Patient needs SNF/Rehab. Cardiology and cardiothoracic surgeon to decide about the TAVR next week. Patient needs follow up with Dr Madyson Hernandez next week. Plan is to discharge today to SNF and follow up with cardiologist to make further plan for CTA prior to TAVR. I spoke with patient patient granddaughter and  about the above. Continue PT/OT for now.         Review of Systems:     Review of Systems:  Symptom  Y/N  Comments   Symptom  Y/N  Comments    Fever/Chills   n   Chest Pain  n    Poor Appetite  n    Edema  n     Cough  n   Abdominal Pain  n     Sputum  n   Joint Pain      SOB/JOSUE  n   Pruritis/Rash      Nausea/vomit  n   Tolerating PT/OT      Diarrhea     Tolerating Diet      Constipation     Other      Could not obtain due to:         Objective:   Physical Exam:     Visit Vitals  /47 Pulse 74   Temp 97.9 °F (36.6 °C)   Resp 18   Ht 5' 7\" (1.702 m)   Wt 81.2 kg (179 lb 0.2 oz)   SpO2 95%   BMI 28.04 kg/m²    O2 Flow Rate (L/min): 2 l/min O2 Device: Room air    Temp (24hrs), Av.7 °F (36.5 °C), Min:97.1 °F (36.2 °C), Max:98.1 °F (36.7 °C)    No intake/output data recorded.  1901 -  0700  In: -   Out: 100 [Urine:100]    General:  Alert, cooperative, no distress, appears stated age. Lungs:   Clear to auscultation bilaterally. Heart:  Regular rate and rhythm, S1, S2 normal, Systolic murmur present.    Abdomen:   Soft, non-tender. Bowel sounds normal.    Extremities: Extremities normal, atraumatic, no cyanosis or edema. Pulses: 2+ and symmetric all extremities. Skin: Skin color, texture, turgor normal. No rashes or lesions   Neurologic: Patient is talking and communicating appropriately. Patient follows the command well. Patient is moving all his extremities        Data Review:       Recent Days:  Recent Labs     198 19  0310   WBC 9.4 6.8   HGB 10.3* 9.5*   HCT 33.1* 30.6*    294     Recent Labs     198 19  0310   * 148*   K 3.7 3.6   * 118*   CO2 25 24   GLU 93 80   BUN 17 20   CREA 1.11 1.09   CA 8.5 8.2*   ALB 2.5* 2.6*   SGOT 17 17   ALT 20 17     No results for input(s): PH, PCO2, PO2, HCO3, FIO2 in the last 72 hours.     24 Hour Results:  Recent Results (from the past 24 hour(s))   GLUCOSE, POC    Collection Time: 19 11:38 AM   Result Value Ref Range    Glucose (POC) 112 (H) 65 - 100 mg/dL    Performed by Katerina Lopez, POC    Collection Time: 19  4:41 PM   Result Value Ref Range    Glucose (POC) 93 65 - 100 mg/dL    Performed by Reatha Course        Problem List:  Problem List as of 2019 Date Reviewed: 2019          Codes Class Noted - Resolved    KEIKO (acute kidney [facility]) ([facility] ICD-10-CM: N17.9  ICD-9-CM: 584.9  2019 - Present        Severe anemia ICD-10-CM: D64.9  ICD-9-CM: 285.9  6/19/2019 - Present        Aortic stenosis ICD-10-CM: I35.0  ICD-9-CM: 424.1  6/17/2019 - Present        GIB (gastrointestinal bleeding) ICD-10-CM: K92.2  ICD-9-CM: 578.9  6/17/2019 - Present        Dehydration ICD-10-CM: E86.0  ICD-9-CM: 276.51  6/11/2019 - Present        Acute hypernatremia ICD-10-CM: E87.0  ICD-9-CM: 276.0  6/11/2019 - Present        Abnormal EKG ICD-10-CM: R94.31  ICD-9-CM: 794.31  6/11/2019 - Present        CKD (chronic kidney disease) stage 3, GFR 30-59 ml/min ([facility]) ICD-10-CM: N18.3  ICD-9-CM: 585.3  2/7/2019 - Present        Anemia ICD-10-CM: D64.9  ICD-9-CM: 285.9  2/7/2019 - Present        Severe aortic stenosis ICD-10-CM: I35.0  ICD-9-CM: 424.1  12/21/2015 - Present        Fall ICD-10-CM: [facility] More[facility]   ICD-9-CM: E888.9  12/21/2015 - Present        Rhabdomyolysis ICD-10-CM: M62.82  ICD-9-CM: 728.88  12/19/2015 - Present        Cellulitis and abscess of leg, except foot ICD-10-CM: L03.119, L02.419  ICD-9-CM: 682.6  2/2/2011 - Present        Diabetes ([address] ICD-10-CM: E11.9  ICD-9-CM: 250.00  2/2/2011 - Present        Essential hypertension, benign (Chronic) ICD-10-CM: I10  ICD-9-CM: 401.1  2/2/2011 - Present        Hip joint replacement (Chronic) ICD-9-CM: V43.64  2/2/2011 - Present        Morbidly obese (HCC) (Chronic) ICD-10-CM: E66.01  ICD-9-CM: 278.01  2/2/2011 - Present        RESOLVED: Elevated troponin I level ICD-10-CM: R74.8  ICD-9-CM: 790.6  12/19/2015 - 12/21/2015        RESOLVED: Encephalopathy ICD-10-CM: G93.40  ICD-9-CM: 348.30  12/19/2015 - 12/21/2015        RESOLVED: Leukocytosis ICD-10-CM: Y29.988  ICD-9-CM: 288.60  12/19/2015 - 12/21/2015              Medications reviewed  Current Facility-Administered Medications   Medication Dose Route Frequency    hydrALAZINE (APRESOLINE) 20 mg/mL injection 10 mg  10 mg IntraVENous Q6H PRN    therapeutic multivitamin (THERAGRAN) tablet 1 Tab  1 Tab Oral DAILY    timolol (TIMOPTIC) 0.5 % ophthalmic solution 1 Drop  1 Drop Left Eye BID    sodium chloride (NS) flush 5-40 mL  5-40 mL IntraVENous Q8H    sodium chloride (NS) flush 5-40 mL  5-40 mL IntraVENous PRN    acetaminophen (TYLENOL) tablet 650 mg  650 mg Oral Q4H PRN    ondansetron (ZOFRAN ODT) tablet 4 mg  4 mg Oral Q4H PRN    insulin lispro (HUMALOG) injection   SubCUTAneous AC&HS    glucose chewable tablet 16 g  4 Tab Oral PRN    dextrose (D50W) injection syrg 12.5-25 g  12.5-25 g IntraVENous PRN    glucagon (GLUCAGEN) injection 1 mg  1 mg IntraMUSCular PRN       Care Plan discussed with: Patient/Family and Nurse    Total time spent with patient: 40 minutes.     Monroe Godoy MD

## 2019-08-02 NOTE — PROGRESS NOTES
Problem: Mobility Impaired (Adult and Pediatric)  Goal: *Acute Goals and Plan of Care (Insert Text)  Description  Physical Therapy Goals  Initiated 8/1/2019  1. Patient will move from supine to sit and sit to supine , scoot up and down and roll side to side in bed with minimal assistance/contact guard assist within 7 day(s). 2.  Patient will transfer from bed to chair and chair to bed once in standing with modified independence using the least restrictive device within 7 day(s). 3.  Patient will perform sit to stand with modified independence from elevated seat height within 7 day(s). 4.  Patient will ambulate with modified independence for 80 feet with the least restrictive device within 7 day(s). 5.  Patient will ascend/descend one stairs with no handrail(s), uses his walker with modified independence within 7 day(s). 6.  Patient will participate in a six minute walk test within 48 hours prior to discharge. Outcome: Progressing Towards Goal    PHYSICAL THERAPY TREATMENT  Patient: May Brown (15 y.o. male)  Date: 8/2/2019  Diagnosis: Fall [W19. XXXA]  Fall [W19. XXXA]  Fall [W19. XXXA] <principal problem not specified>  Procedure(s) (LRB):  LEFT AND RIGHT HEART CATH / CORONARY ANGIOGRAPHY (N/A) 2 Days Post-Op  Precautions: Fall  Chart, physical therapy assessment, plan of care and goals were reviewed. ASSESSMENT  Based on the objective data described below, the patient is making gains towards functional goals but continues to require high levels of physical assistance for transfers. Sitting balance limited by poor hip flexion causing continuous retropulsion in sitting and making it difficult for him to sit unsupported. Gait with shuffling steps and slow portillo but minimal assistance overall with cues for RW management. He remains at high risk of falls d/t difficulty with transfers and balance. Continue to recommend discharge to a SNF level rehab setting.     Current Level of Function Impacting Discharge (mobility/balance): mod-max x2 for sit to stand    Other factors to consider for discharge: patient modified independent in the home prior to recent illness         PLAN :  Patient continues to benefit from skilled intervention to address the above impairments. Continue treatment per established plan of care. to address goals. Recommendation for discharge: (in order for the patient to meet his/her long term goals)  Therapy up to 5 days/week in SNF setting    This discharge recommendation:  Has been made in collaboration with the attending provider and/or case management    Equipment recommendations for successful discharge (if) home: to be determined by rehab facility       SUBJECTIVE:   Patient stated I can try. That would be good.     OBJECTIVE DATA SUMMARY:   Critical Behavior:  Neurologic State: Alert  Orientation Level: Oriented X4  Cognition: Appropriate decision making, Appropriate safety awareness  Safety/Judgement: Awareness of environment, Fall prevention  Functional Mobility Training:  Bed Mobility:     Supine to Sit: Moderate assistance;Assist x2     Scooting: Moderate assistance;Assist x2        Transfers:  Sit to Stand: Moderate assistance;Maximum assistance;Assist x2                                Balance:  Sitting: Impaired; Without support  Sitting - Static: Fair (occasional)  Sitting - Dynamic: Fair (occasional)  Standing: Impaired; With support  Standing - Static: Fair  Standing - Dynamic : Fair  Ambulation/Gait Training:  Distance (ft): 12 Feet (ft)  Assistive Device: Gait belt;Walker, rolling  Ambulation - Level of Assistance: Minimal assistance        Gait Abnormalities: Decreased step clearance        Base of Support: Widened     Speed/Seema: Pace decreased (<100 feet/min); Slow  Step Length: Left shortened;Right shortened                    Stairs:               Therapeutic Exercises:   Marching in place x10 reps  Pain Rating:      Activity Tolerance:   Fair  Please refer to the flowsheet for vital signs taken during this treatment.     After treatment patient left in no apparent distress:   Sitting in chair and Call bell within reach    COMMUNICATION/COLLABORATION:   The patients plan of care was discussed with: Registered Nurse    Colton Porter PT, DPT   Time Calculation: 25 mins

## 2019-08-02 NOTE — PROGRESS NOTES
[facility] FLOOR Progress Note    Admit Date: 2019        Subjective:   Pt seen with Dr. Nasrin Rivera. No new complaints. Objective:     Visit Vitals  /47   Pulse 74   Temp 97.9 °F (36.6 °C)   Resp 18   Ht 5' 7\" (1.702 m)   Wt 179 lb 0.2 oz (81.2 kg)   SpO2 95%   BMI 28.04 kg/m²       Temp (24hrs), Av.7 °F (36.5 °C), Min:97.1 °F (36.2 °C), Max:98.1 °F (36.7 °C)      Last 24hr Input/Output:  No intake or output data in the 24 hours ending 19 0851     Oxygen:  RA    CXR:  CXR Results  (Last 48 hours)    None        Admission Weight: Last Weight   Weight: 166 lb 3.6 oz (75.4 kg) Weight: 179 lb 0.2 oz (81.2 kg)       EXAM:      Lungs:   Clear to auscultation bilaterally. Heart:  Regular rate and rhythm, S1, S2 normal, no murmur, click, rub or gallop. Abdomen:   Soft, non-tender. Bowel sounds normal. No masses,  No organomegaly. Extremities:       Neurologic:  Gross motor and sensory apparatus intact. Activity:  Ad juana    Lab Data Reviewed:   Recent Labs     19  0318   WBC 9.4   HGB 10.3*   HCT 33.1*      CREA 1.11         Assessment:     Active Problems:    Fall (2015)             Plan/Recommendations/Medical Decision Makin. Severe AS - Quite limited mobility. At least High Risk Surgical candidate. Not open SAVR candidate. Pt would like to explore TAVR at this time. Needs Gated CTA but cardiac catheterization 19, hx of CKD and KEIKO with Cr peak 1.9 during  admission. To obtain CTA on Monday and then make decision about potential TAVR timing. 2. HTN - controlled on hydralzine  3. CKD III (Cr 1.3-1.7 baseline) - Cr 1.11 today, below baseline currently. Has had volume resuscitation at admission but cardiac cath < 24 hr ago. To obtain CTA to eval for TAVR access options on Monday per #1. 4. Anemia - unclear etiology. Followed by Dr. Itz Titus (heme/onc) and has profound iron deficiency and B12 deficiency and was receiving tx to include IV iron.   Invasive GI eval not completed in June d/t risks > benefits with critical AS. No recurrent bleeding. 5. DM II -Accu checks  6. CAD s/p PCI (2014) - No new obstructive CAD on recent cath  7.  s/p B THR (2000) - Restricted mobility. PT/OT  8. B cataract - eye gtts. Limited vision and cloudy lens  9. Unexplained Wt loss - identified in June admission that 50lb loss in 2 yrs. Followed by heme/onc and GI (Maria Nyhan). Pt w/ severe protein calorie malnutrition with albumin 3 on this admission but up from 2.4 at last admission. Nutrition consulted last admission and good PO intake at that time. Suspect poor nutrition when home alone large contributor. Unclear if malignant etiology as well. CTA will assist with this dx. Pt up ~ 10 lbs today compared with discharge ~ month ago. Suspect routine meals while a rehab for last month helpful. May have some fluid wt today but doubtful 10 lbs worth. [address] to discharge to rehab from our standpoint. We will set up outpatient appointment with vavle clinic and gate CT scan.      Signed By: LIYAH Boston

## 2019-08-02 NOTE — PROGRESS NOTES
8/2/2019     Admit Date: 7/29/2019    Admit Diagnosis: Fall [W19. XXXA]  Fall [W19. XXXA]  Fall [W19. XXXA]    Active Problems:    Fall (12/21/2015)        Assessment/Plan:  Doing well; lying flat; no complaints  As d/w Dr Shashank Batista, the patient can be discharged to rehab and come back next week for OP testing in preparation for his TAVR  I will have the patient see me some time next week in my office  Thanks     Subjective:  Feels well this morning     [employer] denies chest pain, dyspnea, orthopnea, dizziness. Objective:     Visit Vitals  /84   Pulse 70   Temp 97.9 °F (36.6 °C)   Resp 18   Ht 170.2 cm (67\")   Wt 81.2 kg (179 lb 0.2 oz)   SpO2 92%   BMI 28.04 kg/m²        Physical Exam:  Neck: supple, symmetrical, trachea midline, no adenopathy, thyroid: not enlarged, symmetric, no tenderness/mass/nodules, no carotid bruit and no JVD  Heart: regular rate and rhythm, S1, S2 normal, systolic murmur: systolic ejection 3/6, musical at 2nd left intercostal space, at lower left sternal border, at apex  Lungs: clear to auscultation bilaterally, normal percussion bilaterally  Abdomen: soft, non-tender. Bowel sounds normal. No masses,  no organomegaly  Extremities: extremities normal, atraumatic, no cyanosis or edema  Pulses: 2+ and symmetric  Neurologic: Grossly normal      Labs:    No results for input(s): CPK, CKMB, TROIQ in the last 72 hours. No lab exists for component: CKQMB, CPKMB  Recent Labs     08/01/19 0318   *   K 3.7   *   BUN 17   CREA 1.11   GLU 93   CA 8.5     Recent Labs     08/01/19 0318   WBC 9.4   HGB 10.3*   HCT 33.1*        No results for input(s): CHOL, LDLC in the last 72 hours.     No lab exists for component: TGL, HDLC,  HBA1C    Telemetry: normal sinus rhythm     Data Review: current meds, labs,recent radiology, intake/output/weight and problem list reviewed

## 2019-08-02 NOTE — PROGRESS NOTES
8/1/2019     Admit Date: 7/29/2019    Admit Diagnosis: Fall [W19. XXXA]  Fall [W19. XXXA]  Fall [W19. XXXA]    Active Problems:    Fall (12/21/2015)        Assessment/Plan:  Severe AS with increasing symptoms  Stable renal function day # 1 post contrast exposure  Plan:  As discussed with the Dr Jade Tavarez, will dc to skilled nursing faciltiy and have him come back for OP testing prior to TAVR     Subjective:  Feels ok today       Phani Mcelroy admits to dyspnea, fatigue. Objective:     Visit Vitals  /74 (BP 1 Location: Left arm, BP Patient Position: At rest)   Pulse 75   Temp 97.5 °F (36.4 °C)   Resp 18   Ht 170.2 cm (67\")   Wt 81.2 kg (179 lb 0.2 oz)   SpO2 90%   BMI 28.04 kg/m²        Physical Exam:  Neck: supple, symmetrical, trachea midline, no adenopathy, thyroid: not enlarged, symmetric, no tenderness/mass/nodules, no carotid bruit and no JVD  Heart: regular rate and rhythm, S1, S2 normal, systolic murmur: late systolic 3/6, musical at 2nd left intercostal space, at lower left sternal border, at apex  Lungs: clear to auscultation bilaterally, normal percussion bilaterally  Abdomen: soft, non-tender. Bowel sounds normal. No masses,  no organomegaly  Extremities: extremities normal, atraumatic, no cyanosis or edema  Pulses: 2+ and symmetric  Neurologic: Grossly normal      Labs:    Recent Labs     07/30/19  0503   TROIQ 0.14*     Recent Labs     08/01/19  0318   *   K 3.7   *   BUN 17   CREA 1.11   GLU 93   CA 8.5     Recent Labs     08/01/19  0318   WBC 9.4   HGB 10.3*   HCT 33.1*        No results for input(s): CHOL, LDLC in the last 72 hours.     No lab exists for component: TGL, HDLC,  HBA1C    Telemetry: normal sinus rhythm     Data Review: current meds, labs,recent radiology, intake/output/weight and problem list reviewed

## 2019-08-02 NOTE — PROGRESS NOTES
Transition of Care Plan    1. Snf ---referral sent to [facility] [phone] /  Accepted  Authorization started per Andriette Sheets at the facility. 2.Patient will need ambulance transport    3. Contact is Jason Juarez [phone]      IVETH met with patient in his room to discuss transition of care planning. Therapy is recommending rehab/snf and patient is in agreement with being discharged to [facility] for rehab    He was discharged from [facility] [id number] about a week and 1/2 ago and is willing to return. He asked IVETH to call his granddaughter Abel, [phone] to secure choice. IVETH talked with Debbie Durbin and she does not want patient to return to [facility]. She would like him closer to her in [place]. CM emailed her the list of Snf's. She is going to investigate [facility]. Peyton Zamudio She will call CM with choice within an hour. .     The plan after Snf is for patient to return home --He lives alone in one level home. Has hired caregivers for 4 hours  through [address] [place] [address] [phone],  Tuesday,  Wednesday and Thursday. The caregiver assists with housekeeping and meal preparation. This will continue when patient is discharged home. Family stops by to check on him regularly. Prior to admission, patient was receiving home healt PT/OT through [facility] and patient would like to use the agency again after rehab. IVETH will send referral to [facility] when choice is given. Patient will need authorization from insurance. UPDATE 11:20 am    Snf choice--   1. [facility]  [phone]  2. [address] [facility] [facility] [phone]  3. [address]    Per granddaughter, patient owns his home and does not qualify for medicaid-- she said the house would need to be sold and he has too many assets for him to remain in [place]. Or receive personal care. IVETH sent referral to [facility] via [facility].   CM to follow

## 2019-08-03 LAB
GLUCOSE BLD STRIP.AUTO-MCNC: 104 MG/DL (ref 65–100)
GLUCOSE BLD STRIP.AUTO-MCNC: 81 MG/DL (ref 65–100)
GLUCOSE BLD STRIP.AUTO-MCNC: 93 MG/DL (ref 65–100)
GLUCOSE BLD STRIP.AUTO-MCNC: 96 MG/DL (ref 65–100)
SERVICE CMNT-IMP: ABNORMAL
SERVICE CMNT-IMP: NORMAL

## 2019-08-03 PROCEDURE — 99218 HC RM OBSERVATION: CPT

## 2019-08-03 PROCEDURE — 82962 GLUCOSE BLOOD TEST: CPT

## 2019-08-03 PROCEDURE — 74011250637 HC RX REV CODE- 250/637: Performed by: FAMILY MEDICINE

## 2019-08-03 RX ADMIN — TIMOLOL MALEATE 1 DROP: 5 SOLUTION OPHTHALMIC at 09:24

## 2019-08-03 RX ADMIN — Medication 10 ML: at 06:53

## 2019-08-03 RX ADMIN — THERA TABS 1 TABLET: TAB at 09:23

## 2019-08-03 RX ADMIN — Medication 10 ML: at 23:26

## 2019-08-03 RX ADMIN — TIMOLOL MALEATE 1 DROP: 5 SOLUTION OPHTHALMIC at 18:09

## 2019-08-03 NOTE — PROGRESS NOTES
Bedside shift change report given to Cristina Sheth RN (oncoming nurse) by Kassi Ocampo RN (offgoing nurse). Report included the following information SBAR, Kardex, Intake/Output and MAR.

## 2019-08-03 NOTE — PROGRESS NOTES
Hospitalist Progress Note          Ritesh Rios MD  Please call  and page for questions. Call physician on-call through the  7pm-7am    Daily Progress Note: 8/3/2019    Primary care provider:Juan Francisco Castillo MD    Date of admission: 7/29/2019  2:47 PM    Admission summery and hospital course:  51-year-old with past medical history of chronic pain, diabetes, bilateral cataracts, heart failure, and aortic stenosis, presenting to the hospital because of unwitnessed fall.  Since 7 a.m. in the morning, he had a fall and was lying on the floor and later on at 2 p.m., his granddaughter noticed that, called the EMS where his blood sugar was 68 though he was awake, and that was the reason he was brought to the hospital.  According to him, his knee was hurting and it gave away and that was his reason for fall. He does have a history of arthritis for the same.  Of note, he was recently admitted in 06/2019 for aortic stenosis, pulmonary edema, CKD stage III, had blood transfused.  EGD and colonoscopy could not be done because he has severe aortic stenosis and balloon aortic valvuloplasty was also not done because his kidney function is getting worse. [facility] was sent to the rehabilitation from there, but then he went home just for a weekend, fell and came back to the hospital.    Subjective:   Denies any new complaints. Denies cp,palpitations, sob, or dizziness. rmeains on O2. family present at bedside  Assessment/Plan:   History of severe aortic stenosis, balloon aortic valvuloplasty postponed in the past.  Cardiac cath on 07/31 to evaluate for TAVR. CT surgery recommending CTA prior to TAVR. -per dr solis as of 8/2, would NOT rec  further workup for TAVR.  will arrange to see in valve clinic, and once he becomes/admits to symptoms, then would get CTA as next step to proceed with TAVR workup  Appreciated Cardiology and CT consult.        Hypertension:   Will monitor with Hydralazine as PRN. Add amlodipine if needed.       Fall.  Recurrent. Probably due to age related weakness and medical issue including anemia, bradycardia and aortic stenosis.   Continue telemetry for now. He has fallen 3 times in the last 1 week at home. He went to home from rehab on .   He might need long-term care at this point in time, only a granddaughter to take care of him as home visit. Patient lives by himself. Patient needs SNF prior to going home. Continue Physical Therapy/Occupational Therapy.      Hypoglycemia, POA. Blood glucose is reasonable now. Continue to monitor.      Troponin of 0.14. Troponin is stabilized. Patient has no CP, SOB and palpitation. TSH normal.  EKG without significant finding.      Chronic kidney disease stage III.    Kidney function is improving.      Anemia.    Esophagogastroduodenoscopy and colonoscopy not done last time due to high risk  h/h stable   See orders for other plans. VTE prophylaxis: SCD  Code status: Full  Discussed plan of care with Patient/Family and Nurse. Pre-admission lived at home. Discharge planning:needs SNF/Rehab when ok with cardio        Review of Systems:     Review of Systems:  Symptom  Y/N  Comments   Symptom  Y/N  Comments    Fever/Chills   n   Chest Pain  n    Poor Appetite  n    Edema  n     Cough  n   Abdominal Pain  n     Sputum  n   Joint Pain      SOB/JOSUE  n   Pruritis/Rash      Nausea/vomit  n   Tolerating PT/OT      Diarrhea     Tolerating Diet      Constipation     Other      Could not obtain due to:         Objective:   Physical Exam:     Visit Vitals  /90 (BP 1 Location: Left arm, BP Patient Position: At rest)   Pulse 71   Temp 97.9 °F (36.6 °C)   Resp 16   Ht 5' 7\" (1.702 m)   Wt 82.1 kg (181 lb)   SpO2 97%   BMI 28.35 kg/m²    O2 Flow Rate (L/min): 2 l/min O2 Device: Nasal cannula    Temp (24hrs), Av.2 °F (36.8 °C), Min:97.9 °F (36.6 °C), Max:98.8 °F (37.1 °C)    No intake/output data recorded.     1901 - 08/03 0700  In: -   Out: 300 [Urine:300]    General:  Alert, cooperative, no distress, appears stated age. Lungs:   Clear to auscultation bilaterally. Heart:  Regular rate and rhythm, S1, S2 normal, Systolic murmur present.    Abdomen:   Soft, non-tender. Bowel sounds normal.    Extremities: Extremities normal, atraumatic, no cyanosis or edema. Pulses: 2+ and symmetric all extremities. Skin: Skin color, texture, turgor normal. No rashes or lesions   Neurologic: Patient is talking and communicating appropriately. Patient follows the command well. Patient is moving all his extremities        Data Review:       Recent Days:  Recent Labs     08/01/19 0318   WBC 9.4   HGB 10.3*   HCT 33.1*        Recent Labs     08/02/19  0940 08/01/19  0318   * 148*   K 3.6 3.7   * 117*   CO2 26 25   * 93   BUN 16 17   CREA 1.06 1.11   CA 8.6 8.5   ALB  --  2.5*   SGOT  --  17   ALT  --  20     No results for input(s): PH, PCO2, PO2, HCO3, FIO2 in the last 72 hours.     24 Hour Results:  Recent Results (from the past 24 hour(s))   GLUCOSE, POC    Collection Time: 08/02/19  9:06 PM   Result Value Ref Range    Glucose (POC) 101 (H) 65 - 100 mg/dL    Performed by Manuel Gutierrez    GLUCOSE, POC    Collection Time: 08/03/19  6:32 AM   Result Value Ref Range    Glucose (POC) 81 65 - 100 mg/dL    Performed by Pee Snyder, POC    Collection Time: 08/03/19 11:25 AM   Result Value Ref Range    Glucose (POC) 104 (H) 65 - 100 mg/dL    Performed by DENISHA Gaona 119, POC    Collection Time: 08/03/19  4:06 PM   Result Value Ref Range    Glucose (POC) 96 65 - 100 mg/dL    Performed by Elba Erickson        Problem List:  Problem List as of 8/3/2019 Date Reviewed: 5/21/2019          Codes Class Noted - Resolved    KEIKO (acute kidney injury) ([facility] 75.) ICD-10-CM: N17.9  ICD-9-CM: 584.9  6/19/2019 - Present        Severe anemia ICD-10-CM: D64.9  ICD-9-CM: 285.9  6/19/2019 - Present Aortic stenosis ICD-10-CM: I35.0  ICD-9-CM: 424.1  6/17/2019 - Present        GIB (gastrointestinal bleeding) ICD-10-CM: K92.2  ICD-9-CM: 578.9  6/17/2019 - Present        Dehydration ICD-10-CM: E86.0  ICD-9-CM: 276.51  6/11/2019 - Present        Acute hypernatremia ICD-10-CM: E87.0  ICD-9-CM: 276.0  6/11/2019 - Present        Abnormal EKG ICD-10-CM: R94.31  ICD-9-CM: 794.31  6/11/2019 - Present        CKD (chronic kidney disease) stage 3, GFR 30-59 ml/min ([facility]) ICD-10-CM: N18.3  ICD-9-CM: 585.3  2/7/2019 - Present        Anemia ICD-10-CM: D64.9  ICD-9-CM: 285.9  2/7/2019 - Present        Severe aortic stenosis ICD-10-CM: I35.0  ICD-9-CM: 424.1  12/21/2015 - Present        Fall ICD-10-CM: Claven Hilts. Geraldo Sickle  ICD-9-CM: E888.9  12/21/2015 - Present        Rhabdomyolysis ICD-10-CM: M62.82  ICD-9-CM: 728.88  12/19/2015 - Present        Cellulitis and abscess of leg, except foot ICD-10-CM: L03.119, L02.419  ICD-9-CM: 682.6  2/2/2011 - Present        Diabetes ([email or web address].) ICD-10-CM: E11.9  ICD-9-CM: 250.00  2/2/2011 - Present        Essential hypertension, benign (Chronic) ICD-10-CM: I10  ICD-9-CM: 401.1  2/2/2011 - Present        Hip joint replacement (Chronic) ICD-9-CM: V43.64  2/2/2011 - Present        Morbidly obese (HCC) (Chronic) ICD-10-CM: E66.01  ICD-9-CM: 278.01  2/2/2011 - Present        RESOLVED: Elevated troponin I level ICD-10-CM: R74.8  ICD-9-CM: 790.6  12/19/2015 - 12/21/2015        RESOLVED: Encephalopathy ICD-10-CM: G93.40  ICD-9-CM: 348.30  12/19/2015 - 12/21/2015        RESOLVED: Leukocytosis ICD-10-CM: F79.536  ICD-9-CM: 288.60  12/19/2015 - 12/21/2015              Medications reviewed  Current Facility-Administered Medications   Medication Dose Route Frequency    hydrALAZINE (APRESOLINE) 20 mg/mL injection 10 mg  10 mg IntraVENous Q6H PRN    therapeutic multivitamin (THERAGRAN) tablet 1 Tab  1 Tab Oral DAILY    timolol (TIMOPTIC) 0.5 % ophthalmic solution 1 Drop  1 Drop Left Eye BID    sodium chloride (NS) flush 5-40 mL  5-40 mL IntraVENous Q8H    sodium chloride (NS) flush 5-40 mL  5-40 mL IntraVENous PRN    acetaminophen (TYLENOL) tablet 650 mg  650 mg Oral Q4H PRN    ondansetron (ZOFRAN ODT) tablet 4 mg  4 mg Oral Q4H PRN    insulin lispro (HUMALOG) injection   SubCUTAneous AC&HS    glucose chewable tablet 16 g  4 Tab Oral PRN    dextrose (D50W) injection syrg 12.5-25 g  12.5-25 g IntraVENous PRN    glucagon (GLUCAGEN) injection 1 mg  1 mg IntraMUSCular PRN       Care Plan discussed with: Patient/Family and Nurse    Total time spent with patient: 40 minutes.     Ashley Whitman MD

## 2019-08-03 NOTE — PROGRESS NOTES
Care plan summary - No changes today, plan for discharge Monday. Problem: Falls - Risk of  Goal: *Absence of Falls  Description  Document Marisol Senthil Fall Risk and appropriate interventions in the flowsheet.   Outcome: Progressing Towards Goal  Note:   Fall Risk Interventions:  Mobility Interventions: Communicate number of staff needed for ambulation/transfer, Patient to call before getting OOB    Mentation Interventions: Adequate sleep, hydration, pain control, Door open when patient unattended, Evaluate medications/consider consulting pharmacy, Increase mobility, More frequent rounding, Reorient patient, Room close to nurse's station, Update white board, Toileting rounds    Medication Interventions: Evaluate medications/consider consulting pharmacy, Patient to call before getting OOB, Teach patient to arise slowly    Elimination Interventions: Call light in reach, Patient to call for help with toileting needs, Toileting schedule/hourly rounds    History of Falls Interventions: Door open when patient unattended, Evaluate medications/consider consulting pharmacy, Investigate reason for fall         Problem: Heart Failure: Day 1  Goal: Off Pathway (Use only if patient is Off Pathway)  Outcome: Progressing Towards Goal  Goal: Activity/Safety  Outcome: Progressing Towards Goal  Goal: Consults, if ordered  Outcome: Progressing Towards Goal  Goal: Diagnostic Test/Procedures  Outcome: Progressing Towards Goal  Goal: Nutrition/Diet  Outcome: Progressing Towards Goal  Goal: Discharge Planning  Outcome: Progressing Towards Goal  Goal: Medications  Outcome: Progressing Towards Goal  Goal: Respiratory  Outcome: Progressing Towards Goal  Goal: Treatments/Interventions/Procedures  Outcome: Progressing Towards Goal  Goal: Psychosocial  Outcome: Progressing Towards Goal  Goal: *Oxygen saturation within defined limits  Outcome: Progressing Towards Goal  Goal: *Hemodynamically stable  Outcome: Progressing Towards Goal  Goal: *Optimal pain control at patient's stated goal  Outcome: Progressing Towards Goal  Goal: *Anxiety reduced or absent  Outcome: Progressing Towards Goal     Problem: Pressure Injury - Risk of  Goal: *Prevention of pressure injury  Description  Document Eitan Scale and appropriate interventions in the flowsheet. Outcome: Progressing Towards Goal  Note:   Pressure Injury Interventions:  Sensory Interventions: Minimize linen layers, Assess changes in LOC    Moisture Interventions: Apply protective barrier, creams and emollients    Activity Interventions: Increase time out of bed, Pressure redistribution bed/mattress(bed type)    Mobility Interventions: HOB 30 degrees or less, Pressure redistribution bed/mattress (bed type), PT/OT evaluation    Nutrition Interventions: Document food/fluid/supplement intake    Friction and Shear Interventions: Apply protective barrier, creams and emollients, Foam dressings/transparent film/skin sealants, HOB 30 degrees or less, Lift sheet, Minimize layers                Problem: Patient Education: Go to Patient Education Activity  Goal: Patient/Family Education  Outcome: Progressing Towards Goal     Problem: Diabetes Self-Management  Goal: *Disease process and treatment process  Description  Define diabetes and identify own type of diabetes; list 3 options for treating diabetes. Outcome: Progressing Towards Goal  Goal: *Incorporating nutritional management into lifestyle  Description  Describe effect of type, amount and timing of food on blood glucose; list 3 methods for planning meals. Outcome: Progressing Towards Goal  Goal: *Incorporating physical activity into lifestyle  Description  State effect of exercise on blood glucose levels. Outcome: Progressing Towards Goal  Goal: *Developing strategies to promote health/change behavior  Description  Define the ABC's of diabetes; identify appropriate screenings, schedule and personal plan for screenings.   Outcome: Progressing Towards Goal  Goal: *Using medications safely  Description  State effect of diabetes medications on diabetes; name diabetes medication taking, action and side effects. Outcome: Progressing Towards Goal  Goal: *Monitoring blood glucose, interpreting and using results  Description  Identify recommended blood glucose targets  and personal targets. Outcome: Progressing Towards Goal  Goal: *Prevention, detection, treatment of acute complications  Description  List symptoms of hyper- and hypoglycemia; describe how to treat low blood sugar and actions for lowering  high blood glucose level. Outcome: Progressing Towards Goal  Goal: *Prevention, detection and treatment of chronic complications  Description  Define the natural course of diabetes and describe the relationship of blood glucose levels to long term complications of diabetes.   Outcome: Progressing Towards Goal  Goal: *Developing strategies to address psychosocial issues  Description  Describe feelings about living with diabetes; identify support needed and support network  Outcome: Progressing Towards Goal  Goal: *Insulin pump training  Outcome: Progressing Towards Goal  Goal: *Sick day guidelines  Outcome: Progressing Towards Goal  Goal: *Patient Specific Goal (EDIT GOAL, INSERT TEXT)  Outcome: Progressing Towards Goal     Problem: Patient Education: Go to Patient Education Activity  Goal: Patient/Family Education  Outcome: Progressing Towards Goal     Problem: Hypertension  Goal: *Blood pressure within specified parameters  Outcome: Progressing Towards Goal  Goal: *Fluid volume balance  Outcome: Progressing Towards Goal  Goal: *Labs within defined limits  Outcome: Progressing Towards Goal     Problem: Patient Education: Go to Patient Education Activity  Goal: Patient/Family Education  Outcome: Progressing Towards Goal     Problem: Heart Failure: Day 5  Goal: Off Pathway (Use only if patient is Off Pathway)  Outcome: Progressing Towards Goal     Problem: Heart Failure: Discharge Outcomes  Goal: *Demonstrates ability to perform prescribed activity without shortness of breath or discomfort  Outcome: Progressing Towards Goal  Goal: *Left ventricular function assessment completed prior to or during stay, or planned for post-discharge  Outcome: Progressing Towards Goal  Goal: *ACEI prescribed if LVEF less than 40% and no contraindications or ARB prescribed  Outcome: Progressing Towards Goal  Goal: *Verbalizes understanding and describes prescribed diet  Outcome: Progressing Towards Goal  Goal: *Verbalizes understanding/describes prescribed medications  Outcome: Progressing Towards Goal  Goal: *Describes available resources and support systems  Description  (eg: Home Health, Palliative Care, Advanced Medical Directive)  Outcome: Progressing Towards Goal  Goal: *Describes smoking cessation resources  Outcome: Progressing Towards Goal  Goal: *Understands and describes signs and symptoms to report to providers(Stroke Metric)  Outcome: Progressing Towards Goal  Goal: *Describes/verbalizes understanding of follow-up/return appt  Description  (eg: to physicians, diabetes treatment coordinator, and other resources  Outcome: Progressing Towards Goal  Goal: *Describes importance of continuing daily weights and changes to report to physician  Outcome: Progressing Towards Goal     Problem: Cellulitis Care Plan (Adult)  Goal: *Control of acute pain  Outcome: Progressing Towards Goal  Goal: *Skin integrity maintained  Outcome: Progressing Towards Goal  Goal: *Absence of infection signs and symptoms  Outcome: Progressing Towards Goal     Problem: Patient Education: Go to Patient Education Activity  Goal: Patient/Family Education  Outcome: Progressing Towards Goal     Problem: Patient Education: Go to Patient Education Activity  Goal: Patient/Family Education  Outcome: Progressing Towards Goal     Problem: Patient Education: Go to Patient Education Activity  Goal: Patient/Family Education  Outcome: Progressing Towards Goal

## 2019-08-03 NOTE — PROGRESS NOTES
0730 - Bedside and Verbal shift change report given to Shona Velazquez RN (oncoming nurse) by Kasey (offgoing nurse). Report included the following information SBAR and Kardex. 2000 - Bedside and Verbal shift change report given to [place] and [place] (oncoming nurse) by Shona Velazquez RN (offgoing nurse). Report included the following information SBAR and Kardex.

## 2019-08-03 NOTE — PROGRESS NOTES
4:43 PM  CM received a call from [facility]. CM informed that Authorization has been approved for SNF with [facility]. Reference number is #[id number]. CM contacted facility to provide updates [phone]. Admissions Team have left the for the day. CM left a message requesting a return call. CM will continue to follow and assist with transition of care needs as they arise.     CARLEY Benjamin/VEE  Care Management

## 2019-08-04 LAB
GLUCOSE BLD STRIP.AUTO-MCNC: 100 MG/DL (ref 65–100)
GLUCOSE BLD STRIP.AUTO-MCNC: 108 MG/DL (ref 65–100)
GLUCOSE BLD STRIP.AUTO-MCNC: 74 MG/DL (ref 65–100)
GLUCOSE BLD STRIP.AUTO-MCNC: 89 MG/DL (ref 65–100)
SERVICE CMNT-IMP: ABNORMAL
SERVICE CMNT-IMP: NORMAL

## 2019-08-04 PROCEDURE — 99218 HC RM OBSERVATION: CPT

## 2019-08-04 PROCEDURE — 74011250637 HC RX REV CODE- 250/637: Performed by: FAMILY MEDICINE

## 2019-08-04 PROCEDURE — 82962 GLUCOSE BLOOD TEST: CPT

## 2019-08-04 RX ADMIN — THERA TABS 1 TABLET: TAB at 09:49

## 2019-08-04 RX ADMIN — Medication 10 ML: at 18:59

## 2019-08-04 RX ADMIN — TIMOLOL MALEATE 1 DROP: 5 SOLUTION OPHTHALMIC at 19:01

## 2019-08-04 RX ADMIN — Medication 10 ML: at 22:00

## 2019-08-04 RX ADMIN — TIMOLOL MALEATE 1 DROP: 5 SOLUTION OPHTHALMIC at 09:51

## 2019-08-04 RX ADMIN — Medication 10 ML: at 07:16

## 2019-08-04 NOTE — PROGRESS NOTES
Hospitalist Progress Note         Please call  and page for questions. Call physician on-call through the  7pm-7am    Daily Progress Note: 8/4/2019    Primary care provider:Gretel Castillo MD    Date of admission: 7/29/2019  2:47 PM    Admission summery and hospital course:  51-year-old with past medical history of chronic pain, diabetes, bilateral cataracts, heart failure, and aortic stenosis, presenting to the hospital because of unwitnessed fall.  Since 7 a.m. in the morning, he had a fall and was lying on the floor and later on at 2 p.m., his granddaughter noticed that, called the EMS where his blood sugar was 68 though he was awake, and that was the reason he was brought to the hospital.  According to him, his knee was hurting and it gave away and that was his reason for fall. He does have a history of arthritis for the same.  Of note, he was recently admitted in 06/2019 for aortic stenosis, pulmonary edema, CKD stage III, had blood transfused.  EGD and colonoscopy could not be done because he has severe aortic stenosis and balloon aortic valvuloplasty was also not done because his kidney function is getting worse. Magnus Gomez was sent to the rehabilitation from there, but then he went home just for a weekend, fell and came back to the hospital.    Subjective:   Denies any new complaints. Eating lunch at time seen. d/w dr Heck Child this am, ok to d/c per cardio standpoint. D/w RN. D/w CM for poss d/c snf today, butawaiting bed  Assessment/Plan:   History of severe aortic stenosis, balloon aortic valvuloplasty postponed in the past.  Cardiac cath on 07/31 to evaluate for TAVR. CT surgery recommending CTA prior to TAVR. -per dr solis as of 8/2, would NOT rec  further workup for TAVR.  will arrange to see in valve clinic, and once he becomes/admits to symptoms, then would get CTA as next step to proceed with TAVR workup  Appreciated Cardiology and CT consult.   Pt to f/u  tiera next wk       Hypertension:   Hydralazine as PRN. Add amlodipine if needed.       Fall.  Recurrent. Probably due to age related weakness and medical issue including anemia, bradycardia and aortic stenosis.   Continue telemetry for now. He has fallen 3 times in the last 1 week at home. He went to home from rehab on .   He might need long-term care at this point in time, only a granddaughter to take care of him as home visit. Patient lives by himself. Patient needs SNF prior to going home. Continue Physical Therapy/Occupational Therapy.      Hypoglycemia, POA. Blood glucose is reasonable now. Continue to monitor.      Troponin of 0.14. Troponin is stabilized. Patient has no CP, SOB and palpitation. TSH normal.  EKG without significant finding.      Chronic kidney disease stage III.    Kidney function is improving.      Still on O2, unable to wean per RN. chk cxr    Anemia.    Esophagogastroduodenoscopy and colonoscopy not done last time due to high risk  h/h stable   See orders for other plans. VTE prophylaxis: SCD  Code status: Full  Discussed plan of care with Patient/Family and Nurse. Pre-admission lived at home.    Discharge planning:needs SNF/Rehab when ok with cardio        Review of Systems:     Review of Systems:  Symptom  Y/N  Comments   Symptom  Y/N  Comments    Fever/Chills   n   Chest Pain  n    Poor Appetite  n    Edema  n     Cough  n   Abdominal Pain  n     Sputum  n   Joint Pain      SOB/JOSUE  n   Pruritis/Rash      Nausea/vomit  n   Tolerating PT/OT      Diarrhea     Tolerating Diet      Constipation     Other      Could not obtain due to:         Objective:   Physical Exam:     Visit Vitals  /75   Pulse 76   Temp 97.6 °F (36.4 °C)   Resp 12   Ht 5' 7\" (1.702 m)   Wt 82.1 kg (181 lb)   SpO2 92%   BMI 28.35 kg/m²    O2 Flow Rate (L/min): 3 l/min O2 Device: Nasal cannula    Temp (24hrs), Av °F (36.7 °C), Min:97.6 °F (36.4 °C), Max:98.2 °F (36.8 °C)     0701 - 08/04 1900  In: -   Out: 400 [Urine:400]   08/02 1901 - 08/04 0700  In: -   Out: 1200 [Urine:1200]    General:  Alert, cooperative, no distress, appears stated age. Lungs:   Clear to auscultation bilaterally. Heart:  Regular rate and rhythm, S1, S2 normal, Systolic murmur present.    Abdomen:   Soft, non-tender. Bowel sounds normal.    Extremities: Extremities normal, atraumatic, no cyanosis or edema. Pulses: 2+ and symmetric all extremities. Skin: Skin color, texture, turgor normal. No rashes or lesions   Neurologic: Patient is talking and communicating appropriately. Patient follows the command well. Patient is moving all his extremities        Data Review:       Recent Days:  No results for input(s): WBC, HGB, HCT, PLT, HGBEXT, HCTEXT, PLTEXT, HGBEXT, HCTEXT, PLTEXT in the last 72 hours. Recent Labs     08/02/19  0940   *   K 3.6   *   CO2 26   *   BUN 16   CREA 1.06   CA 8.6     No results for input(s): PH, PCO2, PO2, HCO3, FIO2 in the last 72 hours.     24 Hour Results:  Recent Results (from the past 24 hour(s))   GLUCOSE, POC    Collection Time: 08/03/19  9:33 PM   Result Value Ref Range    Glucose (POC) 93 65 - 100 mg/dL    Performed by Leslee Santos    GLUCOSE, POC    Collection Time: 08/04/19  6:19 AM   Result Value Ref Range    Glucose (POC) 74 65 - 100 mg/dL    Performed by Lulu Kapoor, POC    Collection Time: 08/04/19  6:48 AM   Result Value Ref Range    Glucose (POC) 89 65 - 100 mg/dL    Performed by Lulu Kapoor, POC    Collection Time: 08/04/19 11:55 AM   Result Value Ref Range    Glucose (POC) 100 65 - 100 mg/dL    Performed by [address], POC    Collection Time: 08/04/19  3:56 PM   Result Value Ref Range    Glucose (POC) 108 (H) 65 - 100 mg/dL    Performed by Katherine Linares        Problem List:  Problem List as of 8/4/2019 Date Reviewed: 5/21/2019          Codes Class Noted - Resolved    KEIKO (acute kidney injury) ([address] ICD-10-CM: N17.9  ICD-9-CM: 584.9  6/19/2019 - Present        Severe anemia ICD-10-CM: D64.9  ICD-9-CM: 285.9  6/19/2019 - Present        Aortic stenosis ICD-10-CM: I35.0  ICD-9-CM: 424.1  6/17/2019 - Present        GIB (gastrointestinal bleeding) ICD-10-CM: K92.2  ICD-9-CM: 578.9  6/17/2019 - Present        Dehydration ICD-10-CM: E86.0  ICD-9-CM: 276.51  6/11/2019 - Present        Acute hypernatremia ICD-10-CM: E87.0  ICD-9-CM: 276.0  6/11/2019 - Present        Abnormal EKG ICD-10-CM: R94.31  ICD-9-CM: 794.31  6/11/2019 - Present        CKD (chronic kidney disease) stage 3, GFR 30-59 ml/min ([facility]) ICD-10-CM: N18.3  ICD-9-CM: 585.3  2/7/2019 - Present        Anemia ICD-10-CM: D64.9  ICD-9-CM: 285.9  2/7/2019 - Present        Severe aortic stenosis ICD-10-CM: I35.0  ICD-9-CM: 424.1  12/21/2015 - Present        Fall ICD-10-CM: Phyliss Cathy. Luc Morena  ICD-9-CM: E888.9  12/21/2015 - Present        Rhabdomyolysis ICD-10-CM: M62.82  ICD-9-CM: 728.88  12/19/2015 - Present        Cellulitis and abscess of leg, except foot ICD-10-CM: L03.119, L02.419  ICD-9-CM: 682.6  2/2/2011 - Present        Diabetes ([facility] [address] ICD-10-CM: E11.9  ICD-9-CM: 250.00  2/2/2011 - Present        Essential hypertension, benign (Chronic) ICD-10-CM: I10  ICD-9-CM: 401.1  2/2/2011 - Present        Hip joint replacement (Chronic) ICD-9-CM: V43.64  2/2/2011 - Present        Morbidly obese ([facility]) (Chronic) ICD-10-CM: E66.01  ICD-9-CM: 278.01  2/2/2011 - Present        RESOLVED: Elevated troponin I level ICD-10-CM: R74.8  ICD-9-CM: 790.6  12/19/2015 - 12/21/2015        RESOLVED: Encephalopathy ICD-10-CM: G93.40  ICD-9-CM: 348.30  12/19/2015 - 12/21/2015        RESOLVED: Leukocytosis ICD-10-CM: B75.185  ICD-9-CM: 288.60  12/19/2015 - 12/21/2015              Medications reviewed  Current Facility-Administered Medications   Medication Dose Route Frequency    hydrALAZINE (APRESOLINE) 20 mg/mL injection 10 mg  10 mg IntraVENous Q6H PRN    therapeutic multivitamin (THERAGRAN) tablet 1 Tab 1 Tab Oral DAILY    timolol (TIMOPTIC) 0.5 % ophthalmic solution 1 Drop  1 Drop Left Eye BID    sodium chloride (NS) flush 5-40 mL  5-40 mL IntraVENous Q8H    sodium chloride (NS) flush 5-40 mL  5-40 mL IntraVENous PRN    acetaminophen (TYLENOL) tablet 650 mg  650 mg Oral Q4H PRN    ondansetron (ZOFRAN ODT) tablet 4 mg  4 mg Oral Q4H PRN    insulin lispro (HUMALOG) injection   SubCUTAneous AC&HS    glucose chewable tablet 16 g  4 Tab Oral PRN    dextrose (D50W) injection syrg 12.5-25 g  12.5-25 g IntraVENous PRN    glucagon (GLUCAGEN) injection 1 mg  1 mg IntraMUSCular PRN       Care Plan discussed with: Patient/Family and Nurse    Total time spent with patient: 40 minutes.     Edi Calle MD

## 2019-08-04 NOTE — PROGRESS NOTES
Bedside shift change report given to Wendy Kapoor (oncoming nurse) by Tiarra Bolden RN and Esteban Collins RN (offgoing nurse). Report included the following information SBAR and Intake/Output.

## 2019-08-04 NOTE — PROGRESS NOTES
11:40 AM  CM contacted [facility] to inquire about an available bed for patient for today  [phone]. No one available within admissions. CM requested to speaking with Supervisor on staff. Call was transferred to [employer]. Supervisor not available at this time. CM left contact information for return call. CM will continue to follow and assist with transition of care needs as they arise.     CARLEY Ortiz/VEE  Care Management

## 2019-08-05 ENCOUNTER — APPOINTMENT (OUTPATIENT)
Dept: GENERAL RADIOLOGY | Age: 84
DRG: 287 | End: 2019-08-05
Attending: HOSPITALIST
Payer: MEDICARE

## 2019-08-05 LAB
GLUCOSE BLD STRIP.AUTO-MCNC: 111 MG/DL (ref 65–100)
GLUCOSE BLD STRIP.AUTO-MCNC: 117 MG/DL (ref 65–100)
GLUCOSE BLD STRIP.AUTO-MCNC: 80 MG/DL (ref 65–100)
GLUCOSE BLD STRIP.AUTO-MCNC: 97 MG/DL (ref 65–100)
GLUCOSE BLD STRIP.AUTO-MCNC: 97 MG/DL (ref 65–100)
SERVICE CMNT-IMP: ABNORMAL
SERVICE CMNT-IMP: ABNORMAL
SERVICE CMNT-IMP: NORMAL

## 2019-08-05 PROCEDURE — 74011250637 HC RX REV CODE- 250/637: Performed by: HOSPITALIST

## 2019-08-05 PROCEDURE — 99218 HC RM OBSERVATION: CPT

## 2019-08-05 PROCEDURE — 96375 TX/PRO/DX INJ NEW DRUG ADDON: CPT

## 2019-08-05 PROCEDURE — 77010033678 HC OXYGEN DAILY

## 2019-08-05 PROCEDURE — 96376 TX/PRO/DX INJ SAME DRUG ADON: CPT

## 2019-08-05 PROCEDURE — 97530 THERAPEUTIC ACTIVITIES: CPT

## 2019-08-05 PROCEDURE — 71045 X-RAY EXAM CHEST 1 VIEW: CPT

## 2019-08-05 PROCEDURE — 74011250636 HC RX REV CODE- 250/636: Performed by: HOSPITALIST

## 2019-08-05 PROCEDURE — 77030011256 HC DRSG MEPILEX <16IN NO BORD MOLN -A

## 2019-08-05 PROCEDURE — 94760 N-INVAS EAR/PLS OXIMETRY 1: CPT

## 2019-08-05 PROCEDURE — 97110 THERAPEUTIC EXERCISES: CPT

## 2019-08-05 PROCEDURE — 82962 GLUCOSE BLOOD TEST: CPT

## 2019-08-05 PROCEDURE — 74011250637 HC RX REV CODE- 250/637: Performed by: FAMILY MEDICINE

## 2019-08-05 RX ORDER — FUROSEMIDE 10 MG/ML
20 INJECTION INTRAMUSCULAR; INTRAVENOUS 2 TIMES DAILY
Status: DISCONTINUED | OUTPATIENT
Start: 2019-08-05 | End: 2019-08-06

## 2019-08-05 RX ORDER — FUROSEMIDE 10 MG/ML
20 INJECTION INTRAMUSCULAR; INTRAVENOUS ONCE
Status: COMPLETED | OUTPATIENT
Start: 2019-08-05 | End: 2019-08-05

## 2019-08-05 RX ORDER — POTASSIUM CHLORIDE 750 MG/1
20 TABLET, FILM COATED, EXTENDED RELEASE ORAL DAILY
Status: DISCONTINUED | OUTPATIENT
Start: 2019-08-05 | End: 2019-08-09 | Stop reason: HOSPADM

## 2019-08-05 RX ADMIN — TIMOLOL MALEATE 1 DROP: 5 SOLUTION OPHTHALMIC at 18:00

## 2019-08-05 RX ADMIN — Medication 10 ML: at 21:13

## 2019-08-05 RX ADMIN — POTASSIUM CHLORIDE 20 MEQ: 750 TABLET, EXTENDED RELEASE ORAL at 16:43

## 2019-08-05 RX ADMIN — Medication 10 ML: at 14:00

## 2019-08-05 RX ADMIN — Medication 10 ML: at 06:26

## 2019-08-05 RX ADMIN — THERA TABS 1 TABLET: TAB at 09:47

## 2019-08-05 RX ADMIN — FUROSEMIDE 20 MG: 10 INJECTION, SOLUTION INTRAMUSCULAR; INTRAVENOUS at 16:42

## 2019-08-05 RX ADMIN — FUROSEMIDE 20 MG: 10 INJECTION, SOLUTION INTRAMUSCULAR; INTRAVENOUS at 12:20

## 2019-08-05 RX ADMIN — TIMOLOL MALEATE 1 DROP: 5 SOLUTION OPHTHALMIC at 09:47

## 2019-08-05 NOTE — ROUTINE PROCESS
Bedside and Verbal shift change report given to [address] (oncoming nurse) by Tal Sumner RN (offgoing nurse). Report included the following information SBAR, Kardex, Intake/Output, MAR and Recent Results.

## 2019-08-05 NOTE — PROGRESS NOTES
Problem: Mobility Impaired (Adult and Pediatric)  Goal: *Acute Goals and Plan of Care (Insert Text)  Description  Physical Therapy Goals  Initiated 8/1/2019  1. Patient will move from supine to sit and sit to supine , scoot up and down and roll side to side in bed with minimal assistance/contact guard assist within 7 day(s). 2.  Patient will transfer from bed to chair and chair to bed once in standing with modified independence using the least restrictive device within 7 day(s). 3.  Patient will perform sit to stand with modified independence from elevated seat height within 7 day(s). 4.  Patient will ambulate with modified independence for 80 feet with the least restrictive device within 7 day(s). 5.  Patient will ascend/descend one stairs with no handrail(s), uses his walker with modified independence within 7 day(s). 6.  Patient will participate in a six minute walk test within 48 hours prior to discharge. Outcome: Progressing Towards Goal       PHYSICAL THERAPY TREATMENT  Patient: Rubin Shahid (58 y.o. male)  Date: 8/5/2019  Diagnosis: Fall [W19. XXXA]  Fall [W19. XXXA]  Fall [W19. XXXA] <principal problem not specified>  Procedure(s) (LRB):  LEFT AND RIGHT HEART CATH / CORONARY ANGIOGRAPHY (N/A) 5 Days Post-Op  Precautions: Fall  Chart, physical therapy assessment, plan of care and goals were reviewed. ASSESSMENT  Based on the objective data described below, patient presented today with continued retropulsion and sitting balance limited by poor hip flexion range of motion causing continuous retropulsion in sitting and making it difficult for him to sit unsupported. Performed sitting EOB with max A for sitting balance and slow slide off the bed. Pt returned to bed after multiple readjustments EOB and performed ROM to increase use of legs.     Current Level of Function Impacting Discharge (mobility/balance): retropulsion and assist for balance with total A to stand leading to decreased mobility    Other factors to consider for discharge: pt was mod I in the home prior to DC         PLAN :  Patient continues to benefit from skilled intervention to address the above impairments. Continue treatment per established plan of care. to address goals. Recommendation for discharge: (in order for the patient to meet his/her long term goals)  Therapy up to 5 days/week in SNF setting    This discharge recommendation:  Has been made in collaboration with the attending provider and/or case management    Equipment recommendations for successful discharge (if) home: TBD       SUBJECTIVE:   Patient stated I am so sore.     OBJECTIVE DATA SUMMARY:   Critical Behavior:  Neurologic State: Alert  Orientation Level: Oriented X4  Cognition: Follows commands  Safety/Judgement: Awareness of environment, Fall prevention  Functional Mobility Training:  Bed Mobility:     Supine to Sit: Moderate assistance  Sit to Supine: Moderate assistance  Scooting: Moderate assistance        Transfers:  Sit to Stand: Total assistance;Assist x1(unable to achieve max A x1)                                Balance:  Sitting: Impaired; Without support  Sitting - Static: Fair (occasional)  Sitting - Dynamic: Fair (occasional)  Ambulation/Gait Training:      Unable to get patient into standing at this time due to assist x1    Pain Rating:  Pt c/o hip soreness and knee stiffness that improved with ROM    Activity Tolerance:   Fair  Please refer to the flowsheet for vital signs taken during this treatment.     After treatment patient left in no apparent distress:   Supine in bed and Call bell within reach    COMMUNICATION/COLLABORATION:   The patients plan of care was discussed with: Registered Nurse    Juan Morataya, PT   Time Calculation: 39 mins

## 2019-08-05 NOTE — PROGRESS NOTES
Bedside and Verbal shift change report given to Cathy Abad RN (oncoming nurse) by Cassia Lopez RN (offgoing nurse). Report included the following information SBAR, Kardex, Procedure Summary, Intake/Output, MAR and Recent Results.

## 2019-08-05 NOTE — PROGRESS NOTES
[facility] FLOOR Progress Note    Admit Date: 2019        Subjective:   Pt seen with Dr. Parish Gonzales. No new complaints. Objective:     Visit Vitals  /78 (BP 1 Location: Right arm, BP Patient Position: At rest)   Pulse 84   Temp 98.6 °F (37 °C)   Resp 16   Ht 5' 7\" (1.702 m)   Wt 181 lb (82.1 kg)   SpO2 90%   BMI 28.35 kg/m²       Temp (24hrs), Av.2 °F (36.8 °C), Min:97.6 °F (36.4 °C), Max:98.6 °F (37 °C)      Last 24hr Input/Output:    Intake/Output Summary (Last 24 hours) at 2019 0951  Last data filed at 2019 0045  Gross per 24 hour   Intake    Output 900 ml   Net -900 ml        Oxygen:  RA    CXR:  CXR Results  (Last 48 hours)               19 0730  XR CHEST PORT Final result    Impression:  IMPRESSION: Increased pulmonary edema. Narrative:  EXAM:  XR CHEST PORT. INDICATION: Hypoxia. COMPARISON: 2019. FINDINGS:    A portable AP radiograph of the chest was obtained at 0714 hours. The patient is   rotated to the left. Lines and tubes: The patient is on a cardiac monitor and nasal oxygen. Lungs: There is increased edema throughout the lungs. Pleura: There is no pneumothorax or pleural effusion. Mediastinum: The cardiac and mediastinal contours and pulmonary vascularity are   normal. The aorta is atherosclerotic. Bones and soft tissues: There are degenerative changes of both shoulders. Admission Weight: Last Weight   Weight: 166 lb 3.6 oz (75.4 kg) Weight: 181 lb (82.1 kg)       EXAM:      Lungs:   Clear to auscultation bilaterally. Heart:  Regular rate and rhythm, S1, S2 normal, 3/6 murmur, click, rub or gallop. Abdomen:   Soft, non-tender. Bowel sounds normal. No masses,  No organomegaly. Extremities:  venous stasis changes. LLE wound w/ dsg intact      Neurologic:  Gross motor and sensory apparatus intact.        Activity:  Ad juana    Lab Data Reviewed:   No results for input(s): WBC, HGB, HCT, PLT, CREA, INR, HGBEXT, HCTEXT, PLTEXT, HGBEXT, HCTEXT, PLTEXT in the last 72 hours. No lab exists for component: INREXT, INREXT      Assessment:     Active Problems:    Fall (2015)             Plan/Recommendations/Medical Decision Makin. Severe AS - Quite limited mobility. At least High Risk Surgical candidate. Not open SAVR candidate. Pt would like to explore TAVR at this time. Needs Gated CTA but cardiac catheterization 19, hx of CKD and KEIKO with Cr peak 1.9 during  admission. Will f/u as outpatient in valve clinic and obtain CTA then. 2. HTN - controlled on hydralzine  3. CKD III (Cr 1.3-1.7 baseline) - no labs today, below baseline currently. Has had volume resuscitation at admission but cardiac cath < 24 hr ago. 4. Anemia - unclear etiology. Followed by Dr. Giana Muhammad (heme/onc) and has profound iron deficiency and B12 deficiency and was receiving tx to include IV iron. Invasive GI eval not completed in  d/t risks > benefits with critical AS. No recurrent bleeding. 5. DM II -Accu checks  6. CAD s/p PCI () - No new obstructive CAD on recent cath  7.  s/p B THR () - Restricted mobility. PT/OT  8. B cataract - eye gtts. Limited vision and cloudy lens  9. Unexplained Wt loss - identified in  admission that 50lb loss in 2 yrs. Followed by heme/onc and GI (Claude Boeck). Pt w/ severe protein calorie malnutrition with albumin 3 on this admission but up from 2.4 at last admission. Nutrition consulted last admission and good PO intake at that time. Suspect poor nutrition when home alone large contributor. Unclear if malignant etiology as well. CTA will assist with this dx. Pt up ~ 10 lbs today compared with discharge ~ month ago. Suspect routine meals while a rehab for last month helpful. May have some fluid wt today but doubtful 10 lbs worth. [address] to discharge to rehab today, f/u appt made for 19 in valve clinic.  Will sign off at this time     Signed By: Tanner Mcnamara, PAKO

## 2019-08-05 NOTE — PROGRESS NOTES
Spiritual Care Assessment/Progress Note  [address]      NAME: Bright Moran      MRN: [id number]  AGE: 80 y.o.  SEX: male  Evangelical Affiliation: Buddhism   Language: English     8/5/2019     Total Time (in minutes): 13     Spiritual Assessment begun in [facility] [id number] through conversation with:         [x]Patient        [] Family    [] Friend(s)        Reason for Consult: Initial/Spiritual assessment, patient floor     Spiritual beliefs: (Please include comment if needed)     [x] Identifies with a doorn tradition: Buddhism         [] Supported by a doron community:            [] Claims no spiritual orientation:           [] Seeking spiritual identity:                [] Adheres to an individual form of spirituality:           [] Not able to assess:                           Identified resources for coping:      [] Prayer                               [] Music                  [] Guided Imagery     [x] Family/friends                 [] Pet visits     [] Devotional reading                         [] Unknown     [] Other:                                              Interventions offered during this visit: (See comments for more details)    Patient Interventions: Affirmation of doron, Affirmation of emotions/emotional suffering, Catharsis/review of pertinent events in supportive environment, Coping skills reviewed/reinforced, Iconic (affirming the presence of God/Higher Power)           Plan of Care:     [] Support spiritual and/or cultural needs    [] Support AMD and/or advance care planning process      [] Support grieving process   [] Coordinate Rites and/or Rituals    [] Coordination with community clergy   [] No spiritual needs identified at this time   [] Detailed Plan of Care below (See Comments)  [] Make referral to Music Therapy  [] Make referral to Pet Therapy     [] Make referral to Addiction services  [] Make referral to [facility]  [] Make referral to Spiritual Care Partner  [] No future visits requested        [x] Follow up visits as needed     Comments:  visit for initial spiritual assessment. Patient sitting up in bed, good eye contact, personable, friendly. Says he is feeling pretty good today. Provided spiritual presence and listening as he spoke of his present thoughts, feelings, and concerns. Spoke briefly about his health saying he is awaiting a transfer to a rehab facility. Says he was previously in a rehab facility and was strong until he went home and became weak again as he was not as active at home. Says he received a little therapy and rehab today and feels better because of it. Described an active doron and trust in God. Appeared comforted and encouraged as a result of this visit and expressed gratitude for this visit. Visited by Rev. Adan Montes MDiv, [facility], [facility]   paging service: [phone])

## 2019-08-05 NOTE — PROGRESS NOTES
Hospitalist Progress Note         Please call  and page for questions. Call physician on-call through the  7pm-7am    Daily Progress Note: 8/5/2019    Primary care provider:Tk Castillo MD    Date of admission: 7/29/2019  2:47 PM    Admission summery and hospital course:  80-year-old with past medical history of chronic pain, diabetes, bilateral cataracts, heart failure, and aortic stenosis, presenting to the hospital because of unwitnessed fall.  Since 7 a.m. in the morning, he had a fall and was lying on the floor and later on at 2 p.m., his granddaughter noticed that, called the EMS where his blood sugar was 68 though he was awake, and that was the reason he was brought to the hospital.  According to him, his knee was hurting and it gave away and that was his reason for fall. He does have a history of arthritis for the same.  Of note, he was recently admitted in 06/2019 for aortic stenosis, pulmonary edema, CKD stage III, had blood transfused.  EGD and colonoscopy could not be done because he has severe aortic stenosis and balloon aortic valvuloplasty was also not done because his kidney function is getting worse. [facility] was sent to the rehabilitation from there, but then he went home just for a weekend, fell and came back to the hospital.    Subjective: Worsened hypoxia today 85-86% on 4 l. Per family over wknd, pt does not use home o2. Pt cleared per cardio for d/c today and snf bed avail but given hypoxia, will remain inpt today and ordered for stat diuresis given worsened pulm edema. celina lreassess tmrw with cxr  Assessment/Plan:   Acute on chronixc hypoxic resp failure, sec to pulm edema in setting of severe AS  -gentle diuresis with kcl.  -was 85-86% on 4L. Per family, no home o2. wean O2 as true.  May need o2 on d/c  -repeat cxr in am and labs and if better thwen can d.c to snf poss on low dose lasix 20mg daily, will confirm with cardio if this is ok    History of severe aortic stenosis, balloon aortic valvuloplasty postponed in the past.  Cardiac cath on 07/31 to evaluate for TAVR. CT surgery recommending CTA prior to TAVR. -per dr solis as of 8/2, would NOT rec  further workup for TAVR. will arrange to see in valve clinic, and once he becomes/admits to symptoms, then would get CTA as next step to proceed with TAVR workup  Appreciated Cardiology and CT consult.   Pt to f/u dr Viji Erickson next wk and dr Chiquis Garza on 8/14. To f/u vavle clinic    Hypertension:   Hydralazine as PRN. Add amlodipine if needed.       Fall.  Recurrent. Probably due to age related weakness and medical issue including anemia, bradycardia and aortic stenosis.   Continue telemetry for now. He has fallen 3 times in the last 1 week at home. He went to home from rehab on 07/22.   He might need long-term care at this point in time, only a granddaughter to take care of him as home visit. Patient lives by himself. Patient needs SNF prior to going home. Continue Physical Therapy/Occupational Therapy.      Hypoglycemia, POA. Blood glucose is reasonable now. Continue to monitor.      Troponin of 0.14. Troponin is stabilized. Patient has no CP, SOB and palpitation. TSH normal.  EKG without significant finding.      Chronic kidney disease stage III.    Kidney function is improving.      Still on O2, unable to wean per RN. chk cxr    Anemia.    Esophagogastroduodenoscopy and colonoscopy not done last time due to high risk  h/h stable   See orders for other plans. VTE prophylaxis: SCD  Code status: Full  Discussed plan of care with Patient/Family and Nurse. Pre-admission lived at home.    Discharge planning:needs SNF/Rehab when ok with cardio        Review of Systems:     Review of Systems:  Symptom  Y/N  Comments   Symptom  Y/N  Comments    Fever/Chills   n   Chest Pain  n    Poor Appetite  n    Edema  n     Cough  n   Abdominal Pain  n     Sputum  n   Joint Pain      SOB/JOSUE  n Pruritis/Rash      Nausea/vomit  n   Tolerating PT/OT      Diarrhea     Tolerating Diet      Constipation     Other      Could not obtain due to:         Objective:   Physical Exam:     Visit Vitals  /69 (BP 1 Location: Right arm, BP Patient Position: At rest)   Pulse 68   Temp 97.8 °F (36.6 °C)   Resp 16   Ht 5' 7\" (1.702 m)   Wt 82.1 kg (181 lb)   SpO2 90%   BMI 28.35 kg/m²    O2 Flow Rate (L/min): 4 l/min O2 Device: Nasal cannula    Temp (24hrs), Av.1 °F (36.7 °C), Min:97.5 °F (36.4 °C), Max:98.6 °F (37 °C)    No intake/output data recorded.  1901 -  0700  In: -   Out: 1500 [Urine:1500]    General:  Alert, cooperative, no distress, appears stated age. Lungs:   Clear to auscultation bilaterally. Heart:  Regular rate and rhythm, S1, S2 normal, Systolic murmur present.    Abdomen:   Soft, non-tender. Bowel sounds normal.    Extremities: Extremities normal, atraumatic, no cyanosis or edema. Pulses: 2+ and symmetric all extremities. Skin: Skin color, texture, turgor normal. No rashes or lesions   Neurologic: Patient is talking and communicating appropriately. Patient follows the command well. Patient is moving all his extremities        Data Review:       Recent Days:  No results for input(s): WBC, HGB, HCT, PLT, HGBEXT, HCTEXT, PLTEXT, HGBEXT, HCTEXT, PLTEXT in the last 72 hours. No results for input(s): NA, K, CL, CO2, GLU, BUN, CREA, CA, MG, PHOS, ALB, TBIL, SGOT, ALT, INR in the last 72 hours. No lab exists for component: INREXT, INREXT  No results for input(s): PH, PCO2, PO2, HCO3, FIO2 in the last 72 hours.     24 Hour Results:  Recent Results (from the past 24 hour(s))   GLUCOSE, POC    Collection Time: 19  3:56 PM   Result Value Ref Range    Glucose (POC) 108 (H) 65 - 100 mg/dL    Performed by Bryan Drake, POC    Collection Time: 19 12:24 AM   Result Value Ref Range    Glucose (POC) 97 65 - 100 mg/dL    Performed by [address] [place], [place] [address]en [facility] Collection Time: 08/05/19  6:19 AM   Result Value Ref Range    Glucose (POC) 80 65 - 100 mg/dL    Performed by Sarah Edwards POC    Collection Time: 08/05/19 11:02 AM   Result Value Ref Range    Glucose (POC) 117 (H) 65 - 100 mg/dL    Performed by LEONA HERNÁNDEZ        Problem List:  Problem List as of 8/5/2019 Date Reviewed: 5/21/2019          Codes Class Noted - Resolved    KEIKO (acute kidney injury) ([facility] [address] ICD-10-CM: N17.9  ICD-9-CM: 584.9  6/19/2019 - Present        Severe anemia ICD-10-CM: D64.9  ICD-9-CM: 285.9  6/19/2019 - Present        Aortic stenosis ICD-10-CM: I35.0  ICD-9-CM: 424.1  6/17/2019 - Present        GIB (gastrointestinal bleeding) ICD-10-CM: K92.2  ICD-9-CM: 578.9  6/17/2019 - Present        Dehydration ICD-10-CM: E86.0  ICD-9-CM: 276.51  6/11/2019 - Present        Acute hypernatremia ICD-10-CM: E87.0  ICD-9-CM: 276.0  6/11/2019 - Present        Abnormal EKG ICD-10-CM: R94.31  ICD-9-CM: 794.31  6/11/2019 - Present        CKD (chronic kidney disease) stage 3, GFR 30-59 ml/min ([facility]) ICD-10-CM: N18.3  ICD-9-CM: 585.3  2/7/2019 - Present        Anemia ICD-10-CM: D64.9  ICD-9-CM: 285.9  2/7/2019 - Present        Severe aortic stenosis ICD-10-CM: I35.0  ICD-9-CM: 424.1  12/21/2015 - Present        Fall ICD-10-CM: Kalpesh Sonam. Mian Poplar  ICD-9-CM: E888.9  12/21/2015 - Present        Rhabdomyolysis ICD-10-CM: M62.82  ICD-9-CM: 728.88  12/19/2015 - Present        Cellulitis and abscess of leg, except foot ICD-10-CM: L03.119, L02.419  ICD-9-CM: 682.6  2/2/2011 - Present        Diabetes ([facility] [address] ICD-10-CM: E11.9  ICD-9-CM: 250.00  2/2/2011 - Present        Essential hypertension, benign (Chronic) ICD-10-CM: I10  ICD-9-CM: 401.1  2/2/2011 - Present        Hip joint replacement (Chronic) ICD-9-CM: V43.64  2/2/2011 - Present        Morbidly obese (HCC) (Chronic) ICD-10-CM: E66.01  ICD-9-CM: 278.01  2/2/2011 - Present        RESOLVED: Elevated troponin I level ICD-10-CM: R74.8  ICD-9-CM: 790.6  12/19/2015 - 12/21/2015        RESOLVED: Encephalopathy ICD-10-CM: G93.40  ICD-9-CM: 348.30  12/19/2015 - 12/21/2015        RESOLVED: Leukocytosis ICD-10-CM: T76.200  ICD-9-CM: 288.60  12/19/2015 - 12/21/2015              Medications reviewed  Current Facility-Administered Medications   Medication Dose Route Frequency    furosemide (LASIX) injection 20 mg  20 mg IntraVENous BID    potassium chloride SR (KLOR-CON 10) tablet 20 mEq  20 mEq Oral DAILY    hydrALAZINE (APRESOLINE) 20 mg/mL injection 10 mg  10 mg IntraVENous Q6H PRN    therapeutic multivitamin (THERAGRAN) tablet 1 Tab  1 Tab Oral DAILY    timolol (TIMOPTIC) 0.5 % ophthalmic solution 1 Drop  1 Drop Left Eye BID    sodium chloride (NS) flush 5-40 mL  5-40 mL IntraVENous Q8H    sodium chloride (NS) flush 5-40 mL  5-40 mL IntraVENous PRN    acetaminophen (TYLENOL) tablet 650 mg  650 mg Oral Q4H PRN    ondansetron (ZOFRAN ODT) tablet 4 mg  4 mg Oral Q4H PRN    insulin lispro (HUMALOG) injection   SubCUTAneous AC&HS    glucose chewable tablet 16 g  4 Tab Oral PRN    dextrose (D50W) injection syrg 12.5-25 g  12.5-25 g IntraVENous PRN    glucagon (GLUCAGEN) injection 1 mg  1 mg IntraMUSCular PRN       Care Plan discussed with: Patient/Family and Nurse    Total time spent with patient: 40 minutes.     Dulce Krause MD

## 2019-08-05 NOTE — PROGRESS NOTES
Call received from [facility]. They have authorization and can take patient today. Cm will wait for MD clearance. 1:00 pm: Cm informed that patient will not be ready for discharge today. Paddy SIBLEY has been notified.   Lucrecia SHAFFERW, ACM

## 2019-08-06 ENCOUNTER — APPOINTMENT (OUTPATIENT)
Dept: GENERAL RADIOLOGY | Age: 84
DRG: 287 | End: 2019-08-06
Attending: HOSPITALIST
Payer: MEDICARE

## 2019-08-06 ENCOUNTER — APPOINTMENT (OUTPATIENT)
Dept: NON INVASIVE DIAGNOSTICS | Age: 84
DRG: 287 | End: 2019-08-06
Attending: INTERNAL MEDICINE
Payer: MEDICARE

## 2019-08-06 LAB
ANION GAP SERPL CALC-SCNC: 7 MMOL/L (ref 5–15)
BNP SERPL-MCNC: 7885 PG/ML
BNP SERPL-MCNC: ABNORMAL PG/ML
BUN SERPL-MCNC: 28 MG/DL (ref 6–20)
BUN/CREAT SERPL: 22 (ref 12–20)
CALCIUM SERPL-MCNC: 8.5 MG/DL (ref 8.5–10.1)
CHLORIDE SERPL-SCNC: 108 MMOL/L (ref 97–108)
CO2 SERPL-SCNC: 30 MMOL/L (ref 21–32)
CREAT SERPL-MCNC: 1.25 MG/DL (ref 0.7–1.3)
GLUCOSE BLD STRIP.AUTO-MCNC: 116 MG/DL (ref 65–100)
GLUCOSE BLD STRIP.AUTO-MCNC: 118 MG/DL (ref 65–100)
GLUCOSE BLD STRIP.AUTO-MCNC: 132 MG/DL (ref 65–100)
GLUCOSE BLD STRIP.AUTO-MCNC: 74 MG/DL (ref 65–100)
GLUCOSE BLD STRIP.AUTO-MCNC: 97 MG/DL (ref 65–100)
GLUCOSE SERPL-MCNC: 66 MG/DL (ref 65–100)
MAGNESIUM SERPL-MCNC: 2 MG/DL (ref 1.6–2.4)
POTASSIUM SERPL-SCNC: 4 MMOL/L (ref 3.5–5.1)
SERVICE CMNT-IMP: ABNORMAL
SERVICE CMNT-IMP: NORMAL
SERVICE CMNT-IMP: NORMAL
SODIUM SERPL-SCNC: 145 MMOL/L (ref 136–145)

## 2019-08-06 PROCEDURE — 74011250637 HC RX REV CODE- 250/637: Performed by: HOSPITALIST

## 2019-08-06 PROCEDURE — 83735 ASSAY OF MAGNESIUM: CPT

## 2019-08-06 PROCEDURE — 99218 HC RM OBSERVATION: CPT

## 2019-08-06 PROCEDURE — 74011250637 HC RX REV CODE- 250/637: Performed by: FAMILY MEDICINE

## 2019-08-06 PROCEDURE — 82962 GLUCOSE BLOOD TEST: CPT

## 2019-08-06 PROCEDURE — 74011000250 HC RX REV CODE- 250: Performed by: INTERNAL MEDICINE

## 2019-08-06 PROCEDURE — 65660000000 HC RM CCU STEPDOWN

## 2019-08-06 PROCEDURE — 83880 ASSAY OF NATRIURETIC PEPTIDE: CPT

## 2019-08-06 PROCEDURE — 71045 X-RAY EXAM CHEST 1 VIEW: CPT

## 2019-08-06 PROCEDURE — 96375 TX/PRO/DX INJ NEW DRUG ADDON: CPT

## 2019-08-06 PROCEDURE — 36415 COLL VENOUS BLD VENIPUNCTURE: CPT

## 2019-08-06 PROCEDURE — 80048 BASIC METABOLIC PNL TOTAL CA: CPT

## 2019-08-06 RX ORDER — BUMETANIDE 0.25 MG/ML
1 INJECTION INTRAMUSCULAR; INTRAVENOUS 2 TIMES DAILY
Status: DISCONTINUED | OUTPATIENT
Start: 2019-08-06 | End: 2019-08-07

## 2019-08-06 RX ADMIN — POTASSIUM CHLORIDE 20 MEQ: 750 TABLET, EXTENDED RELEASE ORAL at 09:33

## 2019-08-06 RX ADMIN — Medication 10 ML: at 06:31

## 2019-08-06 RX ADMIN — BUMETANIDE 1 MG: 0.25 INJECTION INTRAMUSCULAR; INTRAVENOUS at 17:15

## 2019-08-06 RX ADMIN — TIMOLOL MALEATE 1 DROP: 5 SOLUTION OPHTHALMIC at 09:33

## 2019-08-06 RX ADMIN — BUMETANIDE 1 MG: 0.25 INJECTION INTRAMUSCULAR; INTRAVENOUS at 09:33

## 2019-08-06 RX ADMIN — Medication 10 ML: at 21:44

## 2019-08-06 RX ADMIN — TIMOLOL MALEATE 1 DROP: 5 SOLUTION OPHTHALMIC at 17:15

## 2019-08-06 RX ADMIN — THERA TABS 1 TABLET: TAB at 09:33

## 2019-08-06 RX ADMIN — Medication 10 ML: at 14:00

## 2019-08-06 NOTE — PROGRESS NOTES
Hospitalist Progress Note         Please call  and page for questions. Call physician on-call through the  7pm-7am    Daily Progress Note: 8/6/2019    Primary care provider:Sami Castillo MD    Date of admission: 7/29/2019  2:47 PM    Admission summery and hospital course:  80-year-old with past medical history of chronic pain, diabetes, bilateral cataracts, heart failure, and aortic stenosis, presenting to the hospital because of unwitnessed fall.  Since 7 a.m. in the morning, he had a fall and was lying on the floor and later on at 2 p.m., his granddaughter noticed that, called the EMS where his blood sugar was 68 though he was awake, and that was the reason he was brought to the hospital.  According to him, his knee was hurting and it gave away and that was his reason for fall. He does have a history of arthritis for the same.  Of note, he was recently admitted in 06/2019 for aortic stenosis, pulmonary edema, CKD stage III, had blood transfused.  EGD and colonoscopy could not be done because he has severe aortic stenosis and balloon aortic valvuloplasty was also not done because his kidney function is getting worse. Suzi Guadalupe was sent to the rehabilitation from there, but then he went home just for a weekend, fell and came back to the hospital.    Subjective:   Denies dysnea. Remains on 4L o2. cxr with improved pulm edema. D/w Dr Martha Cruz this morning, will see pt  Assessment/Plan:   Acute on chronixc hypoxic resp failure, sec to pulm edema in setting of severe AS  -gentle diuresis with kcl.cardio changed lasix to iv bumex 1mg bid and ordered echo  -was 85-86% on 4L 8/5. Per family, no home o2. wean O2 as true. May need o2 on d/c  -cxr 8/5 worsened pulm edema. Repeat cxr 8/6 improved  -pBNP 10,373 on 8/6    History of severe aortic stenosis, balloon aortic valvuloplasty postponed in the past.  Cardiac cath on 07/31 to evaluate for TAVR.  CT surgery recommending CTA prior to TAVR. -per dr solis as of 8/2, would NOT rec  further workup for TAVR. will arrange to see in valve clinic, and once he becomes/admits to symptoms, then would get CTA as next step to proceed with TAVR workup  Appreciated Cardiology and CT consult.   Per cardio, pt may not be candidate for TAVR given he keeps changing his mind about whether he wants to proceed or not. To f/u o/pt fur further mgmt of this and final decision  Pt to f/u dr Shannan Tirado next wk and dr Vida Hayes on 8/14. To f/u valve clinic    Hypertension:   Hydralazine as PRN. Add amlodipine if needed.       Fall.  Recurrent. Probably due to age related weakness and medical issue including anemia, bradycardia and aortic stenosis.   Continue telemetry for now. He has fallen 3 times in the last 1 week at home. He went to home from rehab on 07/22.   He might need long-term care at this point in time, only a granddaughter to take care of him as home visit. Patient lives by himself. Continue Physical Therapy/Occupational Therapy.      Hypoglycemia, POA. Blood glucose is reasonable now. Continue to monitor.      Troponin of 0.14. Troponin is stabilized. Patient has no CP, SOB and palpitation. TSH normal.  EKG without significant finding.      Chronic kidney disease stage III.    Kidney function is improving.          Anemia.    Esophagogastroduodenoscopy and colonoscopy not done last time due to high risk  h/h stable   See orders for other plans. VTE prophylaxis: SCD  Code status: Full  Discussed plan of care with Patient/Family and Nurse. Pre-admission lived at home.    Discharge planning:needs SNF/Rehab when ok with cardio        Review of Systems:     Review of Systems:  Symptom  Y/N  Comments   Symptom  Y/N  Comments    Fever/Chills   n   Chest Pain  n    Poor Appetite  n    Edema  n     Cough  n   Abdominal Pain  n     Sputum  n   Joint Pain      SOB/JOSUE  n   Pruritis/Rash      Nausea/vomit  n   Tolerating PT/OT      Diarrhea     Tolerating Diet      Constipation     Other      Could not obtain due to:         Objective:   Physical Exam:     Visit Vitals  /52 (BP 1 Location: Right arm, BP Patient Position: Head of bed elevated (Comment degrees))   Pulse (!) 52   Temp 98.2 °F (36.8 °C)   Resp 18   Ht 5' 7\" (1.702 m)   Wt 82.1 kg (181 lb)   SpO2 98%   BMI 28.35 kg/m²    O2 Flow Rate (L/min): 4 l/min O2 Device: Nasal cannula    Temp (24hrs), Av.1 °F (36.7 °C), Min:97.8 °F (36.6 °C), Max:98.5 °F (36.9 °C)    701 - 1900  In: -   Out: 100 [Urine:100]   1901 - 700  In: -   Out: 600 [Urine:600]    General:  Alert, cooperative, no distress, appears stated age. Lungs:   Clear to auscultation bilaterally. Heart:  Regular rate and rhythm, S1, S2 normal, Systolic murmur present.    Abdomen:   Soft, non-tender. Bowel sounds normal.    Extremities: Extremities normal, atraumatic, no cyanosis or edema. Pulses: 2+ and symmetric all extremities. Skin: Skin color, texture, turgor normal. No rashes or lesions   Neurologic: Patient is talking and communicating appropriately. Patient follows the command well. Patient is moving all his extremities        Data Review:       Recent Days:  No results for input(s): WBC, HGB, HCT, PLT, HGBEXT, HCTEXT, PLTEXT, HGBEXT, HCTEXT, PLTEXT in the last 72 hours. Recent Labs     19  0250      K 4.0      CO2 30   GLU 66   BUN 28*   CREA 1.25   CA 8.5   MG 2.0     No results for input(s): PH, PCO2, PO2, HCO3, FIO2 in the last 72 hours.     24 Hour Results:  Recent Results (from the past 24 hour(s))   GLUCOSE, POC    Collection Time: 19  4:54 PM   Result Value Ref Range    Glucose (POC) 111 (H) 65 - 100 mg/dL    Performed by Saqib Oliver, POC    Collection Time: 19  9:18 PM   Result Value Ref Range    Glucose (POC) 97 65 - 100 mg/dL    Performed by [address], BASIC    Collection Time: 19  2:50 AM   Result Value Ref Range Sodium 145 136 - 145 mmol/L    Potassium 4.0 3.5 - 5.1 mmol/L    Chloride 108 97 - 108 mmol/L    CO2 30 21 - 32 mmol/L    Anion gap 7 5 - 15 mmol/L    Glucose 66 65 - 100 mg/dL    BUN 28 (H) 6 - 20 MG/DL    Creatinine 1.25 0.70 - 1.30 MG/DL    BUN/Creatinine ratio 22 (H) 12 - 20      GFR est AA >60 >60 ml/min/1.73m2    GFR est non-AA 54 (L) >60 ml/min/1.73m2    Calcium 8.5 8.5 - 10.1 MG/DL   MAGNESIUM    Collection Time: 08/06/19  2:50 AM   Result Value Ref Range    Magnesium 2.0 1.6 - 2.4 mg/dL   NT-PRO BNP    Collection Time: 08/06/19  2:50 AM   Result Value Ref Range    NT pro-BNP 10,374 (H) <450 PG/ML   GLUCOSE, POC    Collection Time: 08/06/19  6:30 AM   Result Value Ref Range    Glucose (POC) 74 65 - 100 mg/dL    Performed by Dennis [place], POC    Collection Time: 08/06/19  6:58 AM   Result Value Ref Range    Glucose (POC) 97 65 - 100 mg/dL    Performed by Ashley Chase    NT-PRO BNP    Collection Time: 08/06/19  9:55 AM   Result Value Ref Range    NT pro-BNP 7,885 (H) <450 PG/ML   GLUCOSE, POC    Collection Time: 08/06/19 11:36 AM   Result Value Ref Range    Glucose (POC) 116 (H) 65 - 100 mg/dL    Performed by Rena Gomez (CON)        Problem List:  Problem List as of 8/6/2019 Date Reviewed: 5/21/2019          Codes Class Noted - Resolved    KEIKO (acute kidney injury) ([facility] [address] ICD-10-CM: N17.9  ICD-9-CM: 584.9  6/19/2019 - Present        Severe anemia ICD-10-CM: D64.9  ICD-9-CM: 285.9  6/19/2019 - Present        Aortic stenosis ICD-10-CM: I35.0  ICD-9-CM: 424.1  6/17/2019 - Present        GIB (gastrointestinal bleeding) ICD-10-CM: K92.2  ICD-9-CM: 578.9  6/17/2019 - Present        Dehydration ICD-10-CM: E86.0  ICD-9-CM: 276.51  6/11/2019 - Present        Acute hypernatremia ICD-10-CM: E87.0  ICD-9-CM: 276.0  6/11/2019 - Present        Abnormal EKG ICD-10-CM: R94.31  ICD-9-CM: 794.31  6/11/2019 - Present        CKD (chronic kidney disease) stage 3, GFR 30-59 ml/min ([facility]) ICD-10-CM: N18.3  ICD-9-CM: 585.3  2/7/2019 - Present        Anemia ICD-10-CM: D64.9  ICD-9-CM: 285.9  2/7/2019 - Present        Severe aortic stenosis ICD-10-CM: I35.0  ICD-9-CM: 424.1  12/21/2015 - Present        Fall ICD-10-CM: W19. Radha   ICD-9-CM: E888.9  12/21/2015 - Present        Rhabdomyolysis ICD-10-CM: M62.82  ICD-9-CM: 728.88  12/19/2015 - Present        Cellulitis and abscess of leg, except foot ICD-10-CM: L03.119, L02.419  ICD-9-CM: 682.6  2/2/2011 - Present        Diabetes ([address] ICD-10-CM: E11.9  ICD-9-CM: 250.00  2/2/2011 - Present        Essential hypertension, benign (Chronic) ICD-10-CM: I10  ICD-9-CM: 401.1  2/2/2011 - Present        Hip joint replacement (Chronic) ICD-9-CM: V43.64  2/2/2011 - Present        Morbidly obese (HCC) (Chronic) ICD-10-CM: E66.01  ICD-9-CM: 278.01  2/2/2011 - Present        RESOLVED: Elevated troponin I level ICD-10-CM: R74.8  ICD-9-CM: 790.6  12/19/2015 - 12/21/2015        RESOLVED: Encephalopathy ICD-10-CM: G93.40  ICD-9-CM: 348.30  12/19/2015 - 12/21/2015        RESOLVED: Leukocytosis ICD-10-CM: J86.304  ICD-9-CM: 288.60  12/19/2015 - 12/21/2015              Medications reviewed  Current Facility-Administered Medications   Medication Dose Route Frequency    bumetanide (BUMEX) injection 1 mg  1 mg IntraVENous BID    potassium chloride SR (KLOR-CON 10) tablet 20 mEq  20 mEq Oral DAILY    hydrALAZINE (APRESOLINE) 20 mg/mL injection 10 mg  10 mg IntraVENous Q6H PRN    therapeutic multivitamin (THERAGRAN) tablet 1 Tab  1 Tab Oral DAILY    timolol (TIMOPTIC) 0.5 % ophthalmic solution 1 Drop  1 Drop Left Eye BID    sodium chloride (NS) flush 5-40 mL  5-40 mL IntraVENous Q8H    sodium chloride (NS) flush 5-40 mL  5-40 mL IntraVENous PRN    acetaminophen (TYLENOL) tablet 650 mg  650 mg Oral Q4H PRN    ondansetron (ZOFRAN ODT) tablet 4 mg  4 mg Oral Q4H PRN    insulin lispro (HUMALOG) injection   SubCUTAneous AC&HS    glucose chewable tablet 16 g  4 Tab Oral PRN    dextrose (D50W) injection syrg 12.5-25 g  12.5-25 g IntraVENous PRN    glucagon (GLUCAGEN) injection 1 mg  1 mg IntraMUSCular PRN       Care Plan discussed with: Patient/Family and Nurse    Total time spent with patient: 40 minutes.     Nessa Balbuena MD

## 2019-08-06 NOTE — PROGRESS NOTES
Bedside and Verbal shift change report given to Dread Mishra (oncoming nurse) by Beverley Posada RN (offgoing nurse). Report included the following information SBAR, Kardex, Procedure Summary, Intake/Output, MAR and Recent Results.

## 2019-08-06 NOTE — WOUND CARE
WOCN Note:  
  
Follow up visit. 
  
Chart reviewed. Admitted DX:  Fall Past Medical History: DM, HF, cataracts, aortic stenosis 
  
Assessment:  
Patient is A&O x 3, communicative and requires assist with repositioning. Bed: total care Patient reports no pain. Patient repositioned on right side with pillow. Heels offloaded on pillow. Sacrum, heels and buttocks intact without erythema.  
  
1. POA, Left low lateral leg, cluster of 2 partial thickness wounds: resolved. Skin Care & Pressure Prevention: 
Minimize layers of linen/pads under patient to optimize support surface. Turn/reposition approximately every 2 hours and offload heels. Manage incontinence / promote continence 
  
Discussed above plan with patient and Yeinfer Rogers RN. 
  
Transition of Care: Plan to sign off, reconsult if needed. 
  
MAKAYLA Saunders RN [facility] Wound Care Office [phone] Pager [phone]

## 2019-08-06 NOTE — PHYSICIAN ADVISORY
Letter of Status Determination:  
Recommend hospitalization status upgraded from OBSERVATION  to INPATIENT  Status Pt Name:  Thais Matos MR#  
TANISHA # [id number] / 
[id number] Payor: Kady Wall / Plan: DEENAANDREINA BOSE MEDICARE COMPLETE / Product Type: Managed Care Medicare /   
KERI#  [id number] Room and Hospital  515/02  @ [facility] hospital  
Hospitalization date  7/29/2019  2:47 PM  
Current Attending Physician  González Webb MD  
Principal diagnosis  Fall [W19. [facility] Fall [W19. Tiney Trinity Fall [W19. Tiney Trinity Clinicals  80 y.o. y.o  male hospitalized with above diagnosis This pt suffers from progressive AS leading to persistent SOB and new persistent hypoxia that is requiring Rx with NC oxygen supplementation now at 4L/min. It is also noted that he has not responded to initial Rx with diuresis and Chest Xray repeated on 8/5/19 showed worsening pulmonary edema. [place] (MCG) criteria Does  apply CHF   
STATUS DETERMINATION  Based on documented presenting clinical data, comorbid conditions, high risk of adverse events and deterioration, it is our recommendation that the patient's status should be upgraded from OBSERVATION to INPATIENT status. The final decision of the patient's hospitalization status depends on the attending physician's judgment. Additional comments Payor: Kady Wall / Plan: [place] BOSE MEDICARE COMPLETE / Product Type: Managed Care Medicare /   
  
 
Shun Neal MD MPH FACP Cell: [phone] Physician Advisor [address]  
   
Cell  [phone]   
 
 
[phone] Nikky Rader

## 2019-08-06 NOTE — ROUTINE PROCESS
Bedside and Verbal shift change report given to [address] [address] [address] (oncoming nurse) by Allison Christina RN (offgoing nurse). Report included the following information SBAR, Kardex, Intake/Output, MAR and Recent Results.

## 2019-08-06 NOTE — PROGRESS NOTES
8/6/2019     Admit Date: 7/29/2019    Admit Diagnosis: Fall [W19. XXXA]  Fall [W19. XXXA]  Fall [W19. XXXA]    Active Problems:    Fall (12/21/2015)        Assessment/Plan:  The patient has developed more pulmonary edema. He says that he is not SOB. Renal functional is stable  Impression:  Progression of his severe AS, natural hx  Increase his diuretics  Repeat limited echo  Not clear that he is a candidate for TAVR and he vacillates between wanting the procedure and declining the procedure     Subjective:  No complaints       Alber Whitney denies chest pain, dyspnea, orthopnea. Objective:     Visit Vitals  /59 (BP 1 Location: Left arm, BP Patient Position: At rest)   Pulse (!) 54   Temp 98 °F (36.7 °C)   Resp 18   Ht 170.2 cm (67\")   Wt 82.1 kg (181 lb)   SpO2 99%   BMI 28.35 kg/m²        Physical Exam:  Neck: supple, symmetrical, trachea midline, no adenopathy, thyroid: not enlarged, symmetric, no tenderness/mass/nodules, no carotid bruit and no JVD  Heart: regular rate and rhythm, S1, S2 normal, systolic murmur: systolic ejection 3/6, musical at 2nd left intercostal space, at 2nd right intercostal space, at lower left sternal border, no rub  Lungs: rales R anterior, L anterior  Abdomen: soft, non-tender. Bowel sounds normal. No masses,  no organomegaly  Extremities: extremities normal, atraumatic, no cyanosis or edema  Pulses: 2+ and symmetric  Neurologic: Grossly normal      Labs:    No results for input(s): CPK, CKMB, TROIQ in the last 72 hours. No lab exists for component: CKQMB, CPKMB  Recent Labs     08/06/19  0250      K 4.0      BUN 28*   CREA 1.25   GLU 66   CA 8.5     No results for input(s): WBC, HGB, HCT, PLT, HGBEXT, HCTEXT, PLTEXT in the last 72 hours. No results for input(s): CHOL, LDLC in the last 72 hours.     No lab exists for component: TGL, HDLC,  HBA1C    Telemetry: normal sinus rhythm     Data Review: current meds, labs,recent radiology, intake/output/weight and problem list reviewed

## 2019-08-06 NOTE — PROGRESS NOTES
NUTRITION  Pt seen for:     [x] Supplements  [x] LOS        RECOMMENDATIONS:   1. Continue current diet   - document % meals consumed in I/O flowsheet  2. Weekly weights on standing scale     SUBJECTIVE/OBJECTIVE:   Information obtained from:   patient        Pt admitted s/p fall. PMHx: DM, CHD, CKD, HLD, chronic pain, HTN. Chronic respiratory failure with severe AS. May need TAVR at later date. Plans for d/c to SNF once medically cleared. Pt visited today. Known from recent admit with fair/good appetite at that time. Usually eats only 2 meals per day at home with stable wt. Pt reports in eating about 50% trays. Agreeable to trial of supplements for added protein - Glucerna BID (440kcal, 20g protein). Will continue to rescreen per protocol.     Diet:  Consistent CHO, 2gm Na  Supplements: none  Intake: [x]           Good     []           Fair      []           Poor   Patient Vitals for the past 100 hrs:   % Diet Eaten   08/04/19 1400 75 %   08/04/19 1000 100 %     Weight Changes:   [x]            Stable  Wt Readings   08/03/19 82.1 kg (181 lb)   06/22/19 76 kg (167 lb 8.8 oz)   05/21/19 79.2 kg (174 lb 8 oz)   02/07/19 80.8 kg (178 lb 3.2 oz)      Nutrition Problems Identified:  [x]      None    PLAN:   []           Obtained/adjusted food preferences/tolerances and/or snacks options   []           Dislikes supplements will try a substitution   []           Modify diet for food allergies  []           Adjust texture due to difficulty chewing   [x]   Continue current diet  []           Educated patient  [x]           Add Supplements    Rescreen:  []            At Nutrition Risk           [x]            Not at [facility], rescreen per screening protocol    Teresa Cruz, RD [address] [place] , Pager #[phone] or [phone]

## 2019-08-07 ENCOUNTER — HOSPITAL ENCOUNTER (OUTPATIENT)
Dept: INFUSION THERAPY | Age: 84
End: 2019-08-07

## 2019-08-07 ENCOUNTER — APPOINTMENT (OUTPATIENT)
Dept: NON INVASIVE DIAGNOSTICS | Age: 84
DRG: 287 | End: 2019-08-07
Attending: INTERNAL MEDICINE
Payer: MEDICARE

## 2019-08-07 LAB
ANION GAP SERPL CALC-SCNC: 6 MMOL/L (ref 5–15)
BUN SERPL-MCNC: 34 MG/DL (ref 6–20)
BUN/CREAT SERPL: 25 (ref 12–20)
CALCIUM SERPL-MCNC: 8.4 MG/DL (ref 8.5–10.1)
CHLORIDE SERPL-SCNC: 106 MMOL/L (ref 97–108)
CO2 SERPL-SCNC: 31 MMOL/L (ref 21–32)
CREAT SERPL-MCNC: 1.37 MG/DL (ref 0.7–1.3)
GLUCOSE BLD STRIP.AUTO-MCNC: 132 MG/DL (ref 65–100)
GLUCOSE BLD STRIP.AUTO-MCNC: 78 MG/DL (ref 65–100)
GLUCOSE BLD STRIP.AUTO-MCNC: 78 MG/DL (ref 65–100)
GLUCOSE BLD STRIP.AUTO-MCNC: 84 MG/DL (ref 65–100)
GLUCOSE BLD STRIP.AUTO-MCNC: 93 MG/DL (ref 65–100)
GLUCOSE BLD STRIP.AUTO-MCNC: 99 MG/DL (ref 65–100)
GLUCOSE SERPL-MCNC: 86 MG/DL (ref 65–100)
MAGNESIUM SERPL-MCNC: 2.1 MG/DL (ref 1.6–2.4)
POTASSIUM SERPL-SCNC: 4.1 MMOL/L (ref 3.5–5.1)
SERVICE CMNT-IMP: ABNORMAL
SERVICE CMNT-IMP: NORMAL
SODIUM SERPL-SCNC: 143 MMOL/L (ref 136–145)

## 2019-08-07 PROCEDURE — 82962 GLUCOSE BLOOD TEST: CPT

## 2019-08-07 PROCEDURE — 74011000250 HC RX REV CODE- 250: Performed by: INTERNAL MEDICINE

## 2019-08-07 PROCEDURE — 80048 BASIC METABOLIC PNL TOTAL CA: CPT

## 2019-08-07 PROCEDURE — 97116 GAIT TRAINING THERAPY: CPT

## 2019-08-07 PROCEDURE — 97535 SELF CARE MNGMENT TRAINING: CPT

## 2019-08-07 PROCEDURE — 83735 ASSAY OF MAGNESIUM: CPT

## 2019-08-07 PROCEDURE — 74011250637 HC RX REV CODE- 250/637: Performed by: FAMILY MEDICINE

## 2019-08-07 PROCEDURE — 65660000000 HC RM CCU STEPDOWN

## 2019-08-07 PROCEDURE — 74011250637 HC RX REV CODE- 250/637: Performed by: HOSPITALIST

## 2019-08-07 PROCEDURE — 65270000032 HC RM SEMIPRIVATE

## 2019-08-07 PROCEDURE — 36415 COLL VENOUS BLD VENIPUNCTURE: CPT

## 2019-08-07 RX ORDER — BUMETANIDE 1 MG/1
1 TABLET ORAL DAILY
Status: DISCONTINUED | OUTPATIENT
Start: 2019-08-08 | End: 2019-08-09 | Stop reason: HOSPADM

## 2019-08-07 RX ADMIN — POTASSIUM CHLORIDE 20 MEQ: 750 TABLET, EXTENDED RELEASE ORAL at 08:53

## 2019-08-07 RX ADMIN — Medication 10 ML: at 14:02

## 2019-08-07 RX ADMIN — Medication 10 ML: at 22:00

## 2019-08-07 RX ADMIN — TIMOLOL MALEATE 1 DROP: 5 SOLUTION OPHTHALMIC at 17:04

## 2019-08-07 RX ADMIN — BUMETANIDE 1 MG: 0.25 INJECTION INTRAMUSCULAR; INTRAVENOUS at 12:19

## 2019-08-07 RX ADMIN — TIMOLOL MALEATE 1 DROP: 5 SOLUTION OPHTHALMIC at 08:54

## 2019-08-07 RX ADMIN — THERA TABS 1 TABLET: TAB at 08:53

## 2019-08-07 NOTE — PROGRESS NOTES
8/7/2019     Admit Date: 7/29/2019    Admit Diagnosis: Fall [W19. XXXA]  Fall [W19. XXXA]  Fall [W19. XXXA]  Fall [W19. XXXA]    Active Problems:    Fall (12/21/2015)        Assessment/Plan:  1. Pulmonary edema has improved  2. Severe aortic stenosis: the structural cardiologist and the cardiac surgeon agree that the patient is too debilitated and they will not offer TAVR. 3. I spoke with Mr Nicholas Munoz about their decision and I spoke with his granddaughter, Rozina Solorio, by phone about their decision. I recommended that we have the patient and his granddaughter meet with Palliative Care to help with decision making going forward. They both agree to this. 4. We can manage his AS with diuretics and BP control       Subjective:  Feels ok today  He remains essentially bed bound       Padilla Iris denies chest pain, dyspnea, orthopnea, dizziness. Objective:     Visit Vitals  /67 (BP 1 Location: Right arm, BP Patient Position: At rest)   Pulse (!) 58   Temp 98 °F (36.7 °C)   Resp 18   Ht 170.2 cm (67\")   Wt 81.7 kg (180 lb 3.2 oz)   SpO2 100%   BMI 28.22 kg/m²        Physical Exam:  Neck: supple, symmetrical, trachea midline, no adenopathy, thyroid: not enlarged, symmetric, no tenderness/mass/nodules, no carotid bruit and no JVD  Heart: regular rate and rhythm, S1, S2 normal, no S3 or S4, systolic murmur: systolic ejection 3/6, cooing throughout the precordium  Lungs: clear to auscultation bilaterally, normal percussion bilaterally  Abdomen: soft, non-tender. Bowel sounds normal. No masses,  no organomegaly  Extremities: extremities normal, atraumatic, no cyanosis or edema  Pulses: 2+ and symmetric  Neurologic: Grossly normal      Labs:    No results for input(s): CPK, CKMB, TROIQ in the last 72 hours.     No lab exists for component: CKQMB, CPKMB  Recent Labs     08/07/19  0220      K 4.1      BUN 34*   CREA 1.37*   GLU 86   CA 8.4*     No results for input(s): WBC, HGB, HCT, PLT, HGBEXT, HCTEXT, PLTEXT in the last 72 hours. No results for input(s): CHOL, LDLC in the last 72 hours.     No lab exists for component: TGL, HDLC,  HBA1C    Telemetry: normal sinus rhythm     Data Review: current meds, labs,recent radiology, intake/output/weight and problem list reviewed

## 2019-08-07 NOTE — PROGRESS NOTES
Cm continuing to follow for transitions of care, discussed patient during rounds. Cm updated [facility] that patient is not medically stable for discharge. They are hoping patients' insurance authorization will still be valid once he is stable, but will not be able to determine that until the discharge date is known. Will follow.      Antoinette RAY, ACM

## 2019-08-07 NOTE — PROGRESS NOTES
Hospitalist Progress Note         Please call  and page for questions. Call physician on-call through the  7pm-7am    Daily Progress Note: 8/7/2019    Primary care provider:Makenna Castillo MD    Date of admission: 7/29/2019  2:47 PM    Admission summery and hospital course:  72-year-old with past medical history of chronic pain, diabetes, bilateral cataracts, heart failure, and aortic stenosis, presenting to the hospital because of unwitnessed fall.  Since 7 a.m. in the morning, he had a fall and was lying on the floor and later on at 2 p.m., his granddaughter noticed that, called the EMS where his blood sugar was 68 though he was awake, and that was the reason he was brought to the hospital.  According to him, his knee was hurting and it gave away and that was his reason for fall. He does have a history of arthritis for the same.  Of note, he was recently admitted in 06/2019 for aortic stenosis, pulmonary edema, CKD stage III, had blood transfused.  EGD and colonoscopy could not be done because he has severe aortic stenosis and balloon aortic valvuloplasty was also not done because his kidney function is getting worse. [facility] was sent to the rehabilitation from there, but then he went home just for a weekend, fell and came back to the hospital.    Subjective:   Doing ok . denies sob. Sitting in chair at bedside with a visitor      Assessment/Plan:   Acute on chronixc hypoxic resp failure, sec to pulm edema in setting of severe AS  -gentle diuresis with kcl.cardio changed lasix to iv bumex 1mg bid and ordered echo  -was 85-86% on 4L 8/5. Per family, no home o2. wean O2 as true. May need o2 on d/c  -cxr 8/5 worsened pulm edema. Repeat cxr 8/6 improved  -pBNP 10,373 on 8/6    History of severe aortic stenosis, balloon aortic valvuloplasty postponed in the past.  Cardiac cath on 07/31 to evaluate for TAVR. CT surgery recommending CTA prior to TAVR.   -per dr solis as of 8/2, would NOT rec  further workup for TAVR. will arrange to see in valve clinic, and once he becomes/admits to symptoms, then would get CTA as next step to proceed with TAVR workup  Appreciated Cardiology and CT consult.   Per cardio, pt not be candidate for TAVR given his poor fxnl status and he keeps changing his mind about whether he wants to proceed or not. To f/u o/pt fur further mgmt of this and final decision  Pt to f/u dr Omid Hwang next wk and dr Ameena Ward on 8/14. To f/u valve clinic  PC c/s by cardio    Hypertension:   Hydralazine as PRN. Add amlodipine if needed.       Fall.  Recurrent. Probably due to age related weakness and medical issue including anemia, bradycardia and aortic stenosis.   Continue telemetry for now. He has fallen 3 times in the last 1 week at home. He went to home from rehab on 07/22.   He might need long-term care at this point in time, only a granddaughter to take care of him as home visit. Patient lives by himself. Continue Physical Therapy/Occupational Therapy.      Hypoglycemia, POA. Blood glucose is reasonable now. Continue to monitor.      Troponin of 0.14. Troponin is stabilized. Patient has no CP, SOB and palpitation. TSH normal.  EKG without significant finding.      Chronic kidney disease stage III.    Kidney function is improving.          Anemia.    Esophagogastroduodenoscopy and colonoscopy not done last time due to high risk  h/h stable   See orders for other plans. VTE prophylaxis: SCD  Code status: Full  Discussed plan of care with Patient/Family and Nurse. Pre-admission lived at home.    Discharge planning:needs SNF/Rehab when ok with cardio        Review of Systems:     Review of Systems:  Symptom  Y/N  Comments   Symptom  Y/N  Comments    Fever/Chills   n   Chest Pain  n    Poor Appetite  n    Edema  n     Cough  n   Abdominal Pain  n     Sputum  n   Joint Pain      SOB/JOSUE  n   Pruritis/Rash      Nausea/vomit  n   Tolerating PT/OT      Diarrhea Tolerating Diet      Constipation     Other      Could not obtain due to:         Objective:   Physical Exam:     Visit Vitals  /57 (BP 1 Location: Right arm, BP Patient Position: At rest)   Pulse (!) 53   Temp 97.3 °F (36.3 °C)   Resp 18   Ht 5' 7\" (1.702 m)   Wt 81.7 kg (180 lb 3.2 oz)   SpO2 100%   BMI 28.22 kg/m²    O2 Flow Rate (L/min): 3 l/min O2 Device: Nasal cannula    Temp (24hrs), Av.8 °F (36.6 °C), Min:97.3 °F (36.3 °C), Max:98.1 °F (36.7 °C)    No intake/output data recorded.  1901 -  0700  In: -   Out: 600 [Urine:600]    General:  Alert, cooperative, no distress, appears stated age. Lungs:   Clear to auscultation bilaterally. Heart:  Regular rate and rhythm, S1, S2 normal, Systolic murmur present.    Abdomen:   Soft, non-tender. Bowel sounds normal.    Extremities: Extremities normal, atraumatic, no cyanosis or edema. Pulses: 2+ and symmetric all extremities. Skin: Skin color, texture, turgor normal. No rashes or lesions   Neurologic: Patient is talking and communicating appropriately. Patient follows the command well. Patient is moving all his extremities        Data Review:       Recent Days:  No results for input(s): WBC, HGB, HCT, PLT, HGBEXT, HCTEXT, PLTEXT, HGBEXT, HCTEXT, PLTEXT in the last 72 hours. Recent Labs     19  0220 19  0250    145   K 4.1 4.0    108   CO2 31 30   GLU 86 66   BUN 34* 28*   CREA 1.37* 1.25   CA 8.4* 8.5   MG 2.1 2.0     No results for input(s): PH, PCO2, PO2, HCO3, FIO2 in the last 72 hours.     24 Hour Results:  Recent Results (from the past 24 hour(s))   GLUCOSE, POC    Collection Time: 19  5:04 PM   Result Value Ref Range    Glucose (POC) 118 (H) 65 - 100 mg/dL    Performed by Mario Whitehead, POC    Collection Time: 19  8:57 PM   Result Value Ref Range    Glucose (POC) 132 (H) 65 - 100 mg/dL    Performed by [id number], BASIC    Collection Time: 19  2:20 AM   Result Value Ref Range    Sodium 143 136 - 145 mmol/L    Potassium 4.1 3.5 - 5.1 mmol/L    Chloride 106 97 - 108 mmol/L    CO2 31 21 - 32 mmol/L    Anion gap 6 5 - 15 mmol/L    Glucose 86 65 - 100 mg/dL    BUN 34 (H) 6 - 20 MG/DL    Creatinine 1.37 (H) 0.70 - 1.30 MG/DL    BUN/Creatinine ratio 25 (H) 12 - 20      GFR est AA 59 (L) >60 ml/min/1.73m2    GFR est non-AA 49 (L) >60 ml/min/1.73m2    Calcium 8.4 (L) 8.5 - 10.1 MG/DL   MAGNESIUM    Collection Time: 08/07/19  2:20 AM   Result Value Ref Range    Magnesium 2.1 1.6 - 2.4 mg/dL   GLUCOSE, POC    Collection Time: 08/07/19  6:27 AM   Result Value Ref Range    Glucose (POC) 84 65 - 100 mg/dL    Performed by Joel Juarez, POC    Collection Time: 08/07/19 11:21 AM   Result Value Ref Range    Glucose (POC) 93 65 - 100 mg/dL    Performed by LEONA HERNÁNDEZ        Problem List:  Problem List as of 8/7/2019 Date Reviewed: 5/21/2019          Codes Class Noted - Resolved    KEIKO (acute kidney injury) ([facility] ICD-10-CM: N17.9  ICD-9-CM: 584.9  6/19/2019 - Present        Severe anemia ICD-10-CM: D64.9  ICD-9-CM: 285.9  6/19/2019 - Present        Aortic stenosis ICD-10-CM: I35.0  ICD-9-CM: 424.1  6/17/2019 - Present        GIB (gastrointestinal bleeding) ICD-10-CM: K92.2  ICD-9-CM: 578.9  6/17/2019 - Present        Dehydration ICD-10-CM: E86.0  ICD-9-CM: 276.51  6/11/2019 - Present        Acute hypernatremia ICD-10-CM: E87.0  ICD-9-CM: 276.0  6/11/2019 - Present        Abnormal EKG ICD-10-CM: R94.31  ICD-9-CM: 794.31  6/11/2019 - Present        CKD (chronic kidney disease) stage 3, GFR 30-59 ml/min (HCC) ICD-10-CM: N18.3  ICD-9-CM: 585.3  2/7/2019 - Present        Anemia ICD-10-CM: D64.9  ICD-9-CM: 285.9  2/7/2019 - Present        Severe aortic stenosis ICD-10-CM: I35.0  ICD-9-CM: 424.1  12/21/2015 - Present        Fall ICD-10-CM: W19. Kristyn Kroner  ICD-9-CM: E888.9  12/21/2015 - Present        Rhabdomyolysis ICD-10-CM: J74.41  ICD-9-CM: 728.88  12/19/2015 - Present Cellulitis and abscess of leg, except foot ICD-10-CM: L03.119, L02.419  ICD-9-CM: 682.6  2/2/2011 - Present        Diabetes ([facility] ICD-10-CM: E11.9  ICD-9-CM: 250.00  2/2/2011 - Present        Essential hypertension, benign (Chronic) ICD-10-CM: I10  ICD-9-CM: 401.1  2/2/2011 - Present        Hip joint replacement (Chronic) ICD-9-CM: V43.64  2/2/2011 - Present        Morbidly obese (HCC) (Chronic) ICD-10-CM: E66.01  ICD-9-CM: 278.01  2/2/2011 - Present        RESOLVED: Elevated troponin I level ICD-10-CM: R74.8  ICD-9-CM: 790.6  12/19/2015 - 12/21/2015        RESOLVED: Encephalopathy ICD-10-CM: G93.40  ICD-9-CM: 348.30  12/19/2015 - 12/21/2015        RESOLVED: Leukocytosis ICD-10-CM: E27.888  ICD-9-CM: 288.60  12/19/2015 - 12/21/2015              Medications reviewed  Current Facility-Administered Medications   Medication Dose Route Frequency    bumetanide (BUMEX) injection 1 mg  1 mg IntraVENous BID    potassium chloride SR (KLOR-CON 10) tablet 20 mEq  20 mEq Oral DAILY    hydrALAZINE (APRESOLINE) 20 mg/mL injection 10 mg  10 mg IntraVENous Q6H PRN    therapeutic multivitamin (THERAGRAN) tablet 1 Tab  1 Tab Oral DAILY    timolol (TIMOPTIC) 0.5 % ophthalmic solution 1 Drop  1 Drop Left Eye BID    sodium chloride (NS) flush 5-40 mL  5-40 mL IntraVENous Q8H    sodium chloride (NS) flush 5-40 mL  5-40 mL IntraVENous PRN    acetaminophen (TYLENOL) tablet 650 mg  650 mg Oral Q4H PRN    ondansetron (ZOFRAN ODT) tablet 4 mg  4 mg Oral Q4H PRN    insulin lispro (HUMALOG) injection   SubCUTAneous AC&HS    glucose chewable tablet 16 g  4 Tab Oral PRN    dextrose (D50W) injection syrg 12.5-25 g  12.5-25 g IntraVENous PRN    glucagon (GLUCAGEN) injection 1 mg  1 mg IntraMUSCular PRN       Care Plan discussed with: Patient/Family and Nurse    Total time spent with patient: 40 minutes.     Gareth Marsh MD

## 2019-08-07 NOTE — PROGRESS NOTES
Problem: Falls - Risk of  Goal: *Absence of Falls  Description  Document eLvy Rm Fall Risk and appropriate interventions in the flowsheet. Outcome: Progressing Towards Goal  Note:   Fall Risk Interventions:  Mobility Interventions: Patient to call before getting OOB    Mentation Interventions: Adequate sleep, hydration, pain control, Familiar objects from home, Eyeglasses and hearing aids, Door open when patient unattended    Medication Interventions: Evaluate medications/consider consulting pharmacy, Patient to call before getting OOB, Teach patient to arise slowly    Elimination Interventions: Call light in reach, Patient to call for help with toileting needs    History of Falls Interventions: Investigate reason for fall         Problem: Heart Failure: Day 1  Goal: Off Pathway (Use only if patient is Off Pathway)  Outcome: Progressing Towards Goal  Goal: Activity/Safety  Outcome: Progressing Towards Goal  Goal: Consults, if ordered  Outcome: Progressing Towards Goal  Goal: Diagnostic Test/Procedures  Outcome: Progressing Towards Goal  Goal: Nutrition/Diet  Outcome: Progressing Towards Goal  Goal: Discharge Planning  Outcome: Progressing Towards Goal  Goal: Medications  Outcome: Progressing Towards Goal  Goal: Respiratory  Outcome: Progressing Towards Goal  Goal: Treatments/Interventions/Procedures  Outcome: Progressing Towards Goal  Goal: Psychosocial  Outcome: Progressing Towards Goal  Goal: *Oxygen saturation within defined limits  Outcome: Progressing Towards Goal  Goal: *Hemodynamically stable  Outcome: Progressing Towards Goal  Goal: *Optimal pain control at patient's stated goal  Outcome: Progressing Towards Goal  Goal: *Anxiety reduced or absent  Outcome: Progressing Towards Goal     Problem: Pressure Injury - Risk of  Goal: *Prevention of pressure injury  Description  Document Eitan Scale and appropriate interventions in the flowsheet.   Outcome: Progressing Towards Goal  Note:   Pressure Injury Interventions:  Sensory Interventions: Assess changes in LOC    Moisture Interventions: Absorbent underpads    Activity Interventions: Assess need for specialty bed    Mobility Interventions: Chair cushion    Nutrition Interventions: Document food/fluid/supplement intake    Friction and Shear Interventions: Apply protective barrier, creams and emollients, Lift sheet, Lift team/patient mobility team, Minimize layers, Feet elevated on foot rest                Problem: Patient Education: Go to Patient Education Activity  Goal: Patient/Family Education  Outcome: Progressing Towards Goal     Problem: Diabetes Self-Management  Goal: *Disease process and treatment process  Description  Define diabetes and identify own type of diabetes; list 3 options for treating diabetes. Outcome: Progressing Towards Goal  Goal: *Incorporating nutritional management into lifestyle  Description  Describe effect of type, amount and timing of food on blood glucose; list 3 methods for planning meals. Outcome: Progressing Towards Goal  Goal: *Incorporating physical activity into lifestyle  Description  State effect of exercise on blood glucose levels. Outcome: Progressing Towards Goal  Goal: *Developing strategies to promote health/change behavior  Description  Define the ABC's of diabetes; identify appropriate screenings, schedule and personal plan for screenings. Outcome: Progressing Towards Goal  Goal: *Using medications safely  Description  State effect of diabetes medications on diabetes; name diabetes medication taking, action and side effects. Outcome: Progressing Towards Goal  Goal: *Monitoring blood glucose, interpreting and using results  Description  Identify recommended blood glucose targets  and personal targets.   Outcome: Progressing Towards Goal  Goal: *Prevention, detection, treatment of acute complications  Description  List symptoms of hyper- and hypoglycemia; describe how to treat low blood sugar and actions for lowering  high blood glucose level. Outcome: Progressing Towards Goal  Goal: *Prevention, detection and treatment of chronic complications  Description  Define the natural course of diabetes and describe the relationship of blood glucose levels to long term complications of diabetes.   Outcome: Progressing Towards Goal  Goal: *Developing strategies to address psychosocial issues  Description  Describe feelings about living with diabetes; identify support needed and support network  Outcome: Progressing Towards Goal  Goal: *Insulin pump training  Outcome: Progressing Towards Goal  Goal: *Sick day guidelines  Outcome: Progressing Towards Goal  Goal: *Patient Specific Goal (EDIT GOAL, INSERT TEXT)  Outcome: Progressing Towards Goal     Problem: Patient Education: Go to Patient Education Activity  Goal: Patient/Family Education  Outcome: Progressing Towards Goal     Problem: Hypertension  Goal: *Blood pressure within specified parameters  Outcome: Progressing Towards Goal  Goal: *Fluid volume balance  Outcome: Progressing Towards Goal  Goal: *Labs within defined limits  Outcome: Progressing Towards Goal     Problem: Patient Education: Go to Patient Education Activity  Goal: Patient/Family Education  Outcome: Progressing Towards Goal     Problem: Heart Failure: Day 5  Goal: Off Pathway (Use only if patient is Off Pathway)  Outcome: Progressing Towards Goal     Problem: Heart Failure: Discharge Outcomes  Goal: *Demonstrates ability to perform prescribed activity without shortness of breath or discomfort  Outcome: Progressing Towards Goal  Goal: *Left ventricular function assessment completed prior to or during stay, or planned for post-discharge  Outcome: Progressing Towards Goal  Goal: *ACEI prescribed if LVEF less than 40% and no contraindications or ARB prescribed  Outcome: Progressing Towards Goal  Goal: *Verbalizes understanding and describes prescribed diet  Outcome: Progressing Towards Goal  Goal: *Verbalizes understanding/describes prescribed medications  Outcome: Progressing Towards Goal  Goal: *Describes available resources and support systems  Description  (eg: Home Health, Palliative Care, Advanced Medical Directive)  Outcome: Progressing Towards Goal  Goal: *Describes smoking cessation resources  Outcome: Progressing Towards Goal  Goal: *Understands and describes signs and symptoms to report to providers(Stroke Metric)  Outcome: Progressing Towards Goal  Goal: *Describes/verbalizes understanding of follow-up/return appt  Description  (eg: to physicians, diabetes treatment coordinator, and other resources  Outcome: Progressing Towards Goal  Goal: *Describes importance of continuing daily weights and changes to report to physician  Outcome: Progressing Towards Goal     Problem: Cellulitis Care Plan (Adult)  Goal: *Control of acute pain  Outcome: Progressing Towards Goal  Goal: *Skin integrity maintained  Outcome: Progressing Towards Goal  Goal: *Absence of infection signs and symptoms  Outcome: Progressing Towards Goal     Problem: Patient Education: Go to Patient Education Activity  Goal: Patient/Family Education  Outcome: Progressing Towards Goal     Problem: Patient Education: Go to Patient Education Activity  Goal: Patient/Family Education  Outcome: Progressing Towards Goal     Problem: Patient Education: Go to Patient Education Activity  Goal: Patient/Family Education  Outcome: Progressing Towards Goal

## 2019-08-07 NOTE — PROGRESS NOTES
Problem: Mobility Impaired (Adult and Pediatric)  Goal: *Acute Goals and Plan of Care (Insert Text)  Description  Physical Therapy Goals  Initiated 8/1/2019  1. Patient will move from supine to sit and sit to supine , scoot up and down and roll side to side in bed with minimal assistance/contact guard assist within 7 day(s). 2.  Patient will transfer from bed to chair and chair to bed once in standing with modified independence using the least restrictive device within 7 day(s). 3.  Patient will perform sit to stand with modified independence from elevated seat height within 7 day(s). 4.  Patient will ambulate with modified independence for 80 feet with the least restrictive device within 7 day(s). 5.  Patient will ascend/descend one stairs with no handrail(s), uses his walker with modified independence within 7 day(s). 6.  Patient will participate in a six minute walk test within 48 hours prior to discharge. Outcome: Progressing Towards Goal   PHYSICAL THERAPY TREATMENT  Patient: Nisha Monreal (49 y.o. male)  Date: 8/7/2019  Diagnosis: Fall [W19. XXXA]  Fall [W19. XXXA]  Fall [W19. XXXA]  Fall [W19. XXXA] <principal problem not specified>  Procedure(s) (LRB):  LEFT AND RIGHT HEART CATH / CORONARY ANGIOGRAPHY (N/A) 7 Days Post-Op  Precautions: Fall  Chart, physical therapy assessment, plan of care and goals were reviewed. ASSESSMENT  Based on the objective data described below, patient continues to be significantly limited in sitting and standing independence and tolerance due to posterior trunk and hip tightness and abdominal weakness from prolonged time in bed. He is still requiring 2 persons for all OOB activity and needed constant cues to maintain semi-upright positioning in sitting, quickly fatigued and began to lean posteriorly, one time requiring significant assist to regain sitting. Patient very motivated to try hard but progressing slowly.   Did better in chair with pillows propped to keep him in more hip flexion, would recommend he be up to chair three times daily with staff A x 2. Current Level of Function Impacting Discharge (mobility/balance): MOD A x 2 to come to sitting EOB from supine, MAX A x 2 to come to standing with patient pulling up on RW concurrently, MIN A x 2 for gait x 4-12' with RW    Other factors to consider for discharge: Patient lives alone and only has granddaughter who comes to assist.  Remains at a very high fall risk, unsafe to d/c home         PLAN :  Patient continues to benefit from skilled intervention to address the above impairments. Continue treatment per established plan of care. to address goals. Recommendation for discharge: (in order for the patient to meet his/her long term goals)  Therapy up to 5 days/week in SNF setting    This discharge recommendation:  Has been made in collaboration with the attending provider and/or case management    Equipment recommendations for successful discharge (if) home: to be determined by rehab facility       SUBJECTIVE:   Patient stated Youll have to help me get to where I'm going, I only have sight in the one eye.     OBJECTIVE DATA SUMMARY:   Critical Behavior:  Neurologic State: Alert  Orientation Level: Oriented X4  Cognition: Appropriate decision making, Appropriate for age attention/concentration  Safety/Judgement: Awareness of environment, Fall prevention  Functional Mobility Training:  Bed Mobility:     Supine to Sit: Moderate assistance;Assist x2              Transfers:  Sit to Stand: Maximum assistance;Assist x2                                Balance:  Sitting: Impaired  Sitting - Static: Fair (occasional)  Sitting - Dynamic: Fair (occasional)  Standing: Impaired; With support  Standing - Static: Fair  Standing - Dynamic : Fair  Ambulation/Gait Training:  Distance (ft): 3 Feet (ft)  Assistive Device: Gait belt;Walker, rolling  Ambulation - Level of Assistance: Minimal assistance;Assist x2        Gait Abnormalities: Decreased step clearance        Base of Support: Narrowed     Speed/Seema: Pace decreased (<100 feet/min)  Step Length: Right shortened;Left shortened         Activity Tolerance:   Fair  Please refer to the flowsheet for vital signs taken during this treatment.     After treatment patient left in no apparent distress:   Sitting in chair, Call bell within reach and OT present     COMMUNICATION/COLLABORATION:   The patients plan of care was discussed with: Occupational Therapist and Registered Nurse    Ezequiel Martines, PT   Time Calculation: 21 mins

## 2019-08-07 NOTE — PROGRESS NOTES
Bedside verbal shift change report given to oncoming nurse Alisha Zamorano R.N. Opportunity for questions and clarifications provided.

## 2019-08-07 NOTE — PROGRESS NOTES
Problem: Self Care Deficits Care Plan (Adult)  Goal: *Acute Goals and Plan of Care (Insert Text)  Description    FUNCTIONAL STATUS PRIOR TO ADMISSION: Patient was modified independent using a Rollator for functional mobility. Pt lives alone and has family who checks in on him, has hired help (mostly housekeeping and meal prep) T,W, and TH for 4 hours each day, was discharged back to home about one week ago from rehab at a SNF, had not fallen (prior to this fall) in the last 12 months, wears a call system pendant but did not use it during this fall, and does not use home 02. He sleeps in a lift chair. HOME SUPPORT: The patient lived alone with family and care aides 4 hours a day 3x/week to provide assistance. Occupational Therapy Goals  Initiated 8/1/2019  1. Patient will perform standing ADLs with supervision/set-up within 7 day(s). 2.  Patient will perform lower body dressing using AE PRN with supervision/set-up within 7 day(s). 3.  Patient will perform bathing with supervision/set-up within 7 day(s). 4.  Patient will perform toilet transfers with supervision/set-up within 7 day(s). 5.  Patient will perform all aspects of toileting with supervision/set-up within 7 day(s). Outcome: Progressing Towards Goal    OCCUPATIONAL THERAPY TREATMENT  Patient: Nacho Bennett (85 y.o. male)  Date: 8/7/2019  Diagnosis: Fall [W19. XXXA]  Fall [W19. XXXA]  Fall [W19. XXXA]  Fall [W19. XXXA] <principal problem not specified>  Procedure(s) (LRB):  LEFT AND RIGHT HEART CATH / CORONARY ANGIOGRAPHY (N/A) 7 Days Post-Op  Precautions: Fall  Chart, occupational therapy assessment, plan of care, and goals were reviewed.     ASSESSMENT  Based on the objective data described below, patient demonstrating deficits in ADLs 2* impaired sitting and standing balance, decreased functional activity tolerance, generalized weakness, baseline impaired vision, impaired LB access, impaired sequencing, need for extra time to follow some commands (2* Eek?) and limited ROM in BUE/BLE (SUDHIR hips in particular). Current Level of Function Impacting Discharge (ADLs): setup-min A for upper body ADLs, max-total A for lower body ADLs and mod-max Ax2 for bed and functional mobility. Other factors to consider for discharge: Patient lives alone and only has granddaughter who comes to assist.  Remains at a very high fall risk, unsafe to d/c home         PLAN :  Patient continues to benefit from skilled intervention to address the above impairments. Continue treatment per established plan of care. to address goals. Recommend with staff: Recommend with nursing patient to complete as able in order to maintain strength, endurance and independence: ADLs with supervision/setup, OOB to chair 3x/day and mobilizing to the [facility] for toileting with 2 assist. Thank you for your assistance. Recommend next OT session: toileting on BSC, EOB ADLs    Recommendation for discharge: (in order for the patient to meet his/her long term goals)  Therapy up to 5 days/week in SNF setting    This discharge recommendation:  Has not yet been discussed the attending provider and/or case management    Equipment recommendations for successful discharge (if) home: to be determined by rehab facility       SUBJECTIVE:   Patient stated Richy carrasco for getting me out of the bed.     OBJECTIVE DATA SUMMARY:   Cognitive/Behavioral Status:  Neurologic State: Alert  Orientation Level: Oriented X4  Cognition: Appropriate for age attention/concentration; Follows commands  Perception: Cues to maintain midline in sitting; Tactile;Verbal;Cues to maintain midline in standing  Perseveration: No perseveration noted  Safety/Judgement: Awareness of environment; Fall prevention    Functional Mobility and Transfers for ADLs:  Bed Mobility:  Supine to Sit: Moderate assistance;Assist x2  Sit to Supine: Moderate assistance;Assist x2  Scooting:  Moderate assistance    Transfers:  Sit to Stand: Maximum assistance;Assist x2  Bed to Chair: Moderate assistance; Adaptive equipment; Additional time;Assist x2    Balance:  Sitting: Impaired  Sitting - Static: Fair (occasional)  Sitting - Dynamic: Fair (occasional)  Standing: Impaired; With support  Standing - Static: Fair  Standing - Dynamic : Fair    ADL Intervention:    Grooming  Washing Face: Set-up  Brushing Teeth: Set-up    Lower Body Dressing Assistance  Socks: Total assistance (dependent)    Cognitive Retraining  Safety/Judgement: Awareness of environment; Fall prevention    Pain:  No c/o pain    Activity Tolerance:   Fair  Please refer to the flowsheet for vital signs taken during this treatment.     After treatment patient left in no apparent distress:   Sitting in chair, Call bell within reach and RN aware     COMMUNICATION/COLLABORATION:   The patients plan of care was discussed with: Physical Therapist and Registered Nurse    Karolyn Cordoba OT  Time Calculation: 26 mins

## 2019-08-08 ENCOUNTER — APPOINTMENT (OUTPATIENT)
Dept: NON INVASIVE DIAGNOSTICS | Age: 84
DRG: 287 | End: 2019-08-08
Attending: INTERNAL MEDICINE
Payer: MEDICARE

## 2019-08-08 LAB
ANION GAP SERPL CALC-SCNC: 6 MMOL/L (ref 5–15)
BASOPHILS # BLD: 0 K/UL (ref 0–0.1)
BASOPHILS NFR BLD: 0 % (ref 0–1)
BUN SERPL-MCNC: 37 MG/DL (ref 6–20)
BUN/CREAT SERPL: 30 (ref 12–20)
CALCIUM SERPL-MCNC: 8.5 MG/DL (ref 8.5–10.1)
CHLORIDE SERPL-SCNC: 106 MMOL/L (ref 97–108)
CO2 SERPL-SCNC: 30 MMOL/L (ref 21–32)
CREAT SERPL-MCNC: 1.25 MG/DL (ref 0.7–1.3)
DIFFERENTIAL METHOD BLD: ABNORMAL
ECHO AO ROOT DIAM: 3.26 CM
ECHO AV AREA PEAK VELOCITY: 0.6 CM2
ECHO AV AREA VTI: 0.5 CM2
ECHO AV MEAN GRADIENT: 41.4 MMHG
ECHO AV PEAK GRADIENT: 70.5 MMHG
ECHO AV PEAK VELOCITY: 419.9 CM/S
ECHO AV VTI: 115.03 CM
ECHO LA MAJOR AXIS: 4.17 CM
ECHO LA TO AORTIC ROOT RATIO: 1.28
ECHO LV INTERNAL DIMENSION DIASTOLIC: 3.46 CM (ref 4.2–5.9)
ECHO LV INTERNAL DIMENSION SYSTOLIC: 2.5 CM
ECHO LV IVSD: 1.64 CM (ref 0.6–1)
ECHO LV MASS 2D: 248.9 G (ref 88–224)
ECHO LV MASS INDEX 2D: 128.9 G/M2 (ref 49–115)
ECHO LV POSTERIOR WALL DIASTOLIC: 1.56 CM (ref 0.6–1)
ECHO LVOT DIAM: 1.69 CM
ECHO LVOT PEAK GRADIENT: 4.8 MMHG
ECHO LVOT PEAK VELOCITY: 110 CM/S
ECHO LVOT SV: 56.8 ML
ECHO LVOT VTI: 25.26 CM
ECHO MV A VELOCITY: 65.5 CM/S
ECHO MV AREA PHT: 2.5 CM2
ECHO MV E DECELERATION TIME (DT): 309.5 MS
ECHO MV E VELOCITY: 91.06 CM/S
ECHO MV E/A RATIO: 1.39
ECHO MV PRESSURE HALF TIME (PHT): 89.8 MS
ECHO PULMONARY ARTERY SYSTOLIC PRESSURE (PASP): 56 MMHG
ECHO PV MAX VELOCITY: 66.29 CM/S
ECHO PV PEAK GRADIENT: 1.8 MMHG
ECHO RV TAPSE: 1.82 CM (ref 1.5–2)
ECHO TV REGURGITANT MAX VELOCITY: 341.54 CM/S
ECHO TV REGURGITANT PEAK GRADIENT: 46.7 MMHG
EOSINOPHIL # BLD: 0.3 K/UL (ref 0–0.4)
EOSINOPHIL NFR BLD: 5 % (ref 0–7)
ERYTHROCYTE [DISTWIDTH] IN BLOOD BY AUTOMATED COUNT: 17.2 % (ref 11.5–14.5)
GLUCOSE BLD STRIP.AUTO-MCNC: 67 MG/DL (ref 65–100)
GLUCOSE BLD STRIP.AUTO-MCNC: 79 MG/DL (ref 65–100)
GLUCOSE BLD STRIP.AUTO-MCNC: 86 MG/DL (ref 65–100)
GLUCOSE BLD STRIP.AUTO-MCNC: 88 MG/DL (ref 65–100)
GLUCOSE BLD STRIP.AUTO-MCNC: 91 MG/DL (ref 65–100)
GLUCOSE BLD STRIP.AUTO-MCNC: 91 MG/DL (ref 65–100)
GLUCOSE SERPL-MCNC: 66 MG/DL (ref 65–100)
HCT VFR BLD AUTO: 31 % (ref 36.6–50.3)
HGB BLD-MCNC: 9.4 G/DL (ref 12.1–17)
IMM GRANULOCYTES # BLD AUTO: 0 K/UL (ref 0–0.04)
IMM GRANULOCYTES NFR BLD AUTO: 0 % (ref 0–0.5)
LYMPHOCYTES # BLD: 1.1 K/UL (ref 0.8–3.5)
LYMPHOCYTES NFR BLD: 17 % (ref 12–49)
MCH RBC QN AUTO: 28.6 PG (ref 26–34)
MCHC RBC AUTO-ENTMCNC: 30.3 G/DL (ref 30–36.5)
MCV RBC AUTO: 94.2 FL (ref 80–99)
MONOCYTES # BLD: 0.8 K/UL (ref 0–1)
MONOCYTES NFR BLD: 13 % (ref 5–13)
NEUTS SEG # BLD: 4 K/UL (ref 1.8–8)
NEUTS SEG NFR BLD: 65 % (ref 32–75)
NRBC # BLD: 0 K/UL (ref 0–0.01)
NRBC BLD-RTO: 0 PER 100 WBC
PLATELET # BLD AUTO: 356 K/UL (ref 150–400)
PMV BLD AUTO: 10.2 FL (ref 8.9–12.9)
POTASSIUM SERPL-SCNC: 4.4 MMOL/L (ref 3.5–5.1)
RBC # BLD AUTO: 3.29 M/UL (ref 4.1–5.7)
SERVICE CMNT-IMP: NORMAL
SODIUM SERPL-SCNC: 142 MMOL/L (ref 136–145)
WBC # BLD AUTO: 6.2 K/UL (ref 4.1–11.1)

## 2019-08-08 PROCEDURE — 80048 BASIC METABOLIC PNL TOTAL CA: CPT

## 2019-08-08 PROCEDURE — 0399T ECHO ADULT COMPLETE: CPT

## 2019-08-08 PROCEDURE — 82962 GLUCOSE BLOOD TEST: CPT

## 2019-08-08 PROCEDURE — 36415 COLL VENOUS BLD VENIPUNCTURE: CPT

## 2019-08-08 PROCEDURE — 74011250637 HC RX REV CODE- 250/637: Performed by: FAMILY MEDICINE

## 2019-08-08 PROCEDURE — 74011250637 HC RX REV CODE- 250/637: Performed by: HOSPITALIST

## 2019-08-08 PROCEDURE — 65660000000 HC RM CCU STEPDOWN

## 2019-08-08 PROCEDURE — 76450000000

## 2019-08-08 PROCEDURE — 97116 GAIT TRAINING THERAPY: CPT

## 2019-08-08 PROCEDURE — 85025 COMPLETE CBC W/AUTO DIFF WBC: CPT

## 2019-08-08 PROCEDURE — 97530 THERAPEUTIC ACTIVITIES: CPT

## 2019-08-08 PROCEDURE — 93308 TTE F-UP OR LMTD: CPT

## 2019-08-08 PROCEDURE — 74011250637 HC RX REV CODE- 250/637: Performed by: INTERNAL MEDICINE

## 2019-08-08 RX ADMIN — POTASSIUM CHLORIDE 20 MEQ: 750 TABLET, EXTENDED RELEASE ORAL at 08:33

## 2019-08-08 RX ADMIN — TIMOLOL MALEATE 1 DROP: 5 SOLUTION OPHTHALMIC at 08:34

## 2019-08-08 RX ADMIN — Medication 10 ML: at 13:16

## 2019-08-08 RX ADMIN — BUMETANIDE 1 MG: 1 TABLET ORAL at 08:33

## 2019-08-08 RX ADMIN — THERA TABS 1 TABLET: TAB at 08:33

## 2019-08-08 RX ADMIN — Medication 10 ML: at 06:42

## 2019-08-08 RX ADMIN — TIMOLOL MALEATE 1 DROP: 5 SOLUTION OPHTHALMIC at 17:56

## 2019-08-08 RX ADMIN — Medication 10 ML: at 22:00

## 2019-08-08 NOTE — CONSULTS
Palliative Medicine Consult  Jason: [phone] ([phone])    Patient Name: Charlanne Sicard  YOB: 1929    Date of Initial Consult: 19  Reason for Consult: Care decisions- to meet w/ pt and granddaughter Josephine  Requesting Provider: Tricia Vázquez   Primary Care Physician: Geovanni Dexter MD     SUMMARY:   Charlanne Sicard is a 80 y.o. with a past history of HTN, DM, CKD, severe aortic stenosis who was admitted on 2019 from [facility] rehab after a fall. Blood sugar low. He was here - for anemia and aortic stenosis. AS prevented EGD and colonoscopy and cardiac interventions prevented by [facility]. went to rehab after. Pt w/ SOB due to pulmonary edema from severe AS, CT surgery not recommending TAVR at this time due to poor functional status and also as he has gone back/forth in his decision. Current medical issues leading to Palliative Medicine involvement include: care decisions. Social: Prior to  admission was living at home alone w/ granddtr Nenita coming over to help out sometimes. Then went to rehab after last admission and now is quite weak. Children , has a sister. PALLIATIVE DIAGNOSES:   1. Fatigue  2. Generalized weakness  3. Shortness of breath improved  4. Goals of care       PLAN:   1. Along w/ Renetta Blair LCSW meet w/ pt who is alert and oriented, relies on his granddtr for help around his house and for medical decision making. 2. He feels pretty good, but weak overall. We introduce ourselves and talk about a family meeting w/ his granddtr too to f/u on Dr Raleigh Mckinnon conversation. 3. Speak to granddtr Nenita who also spoke w/ cardiology yest and understands that pt too weak for a TAVR, and that he is recommending medical management- although does have f/u at the [facility] clinic in a couple weeks.    4. Plan to meet tmrw at 10am. Will also discuss code status and complete an AMD.   5. Initial consult note routed to primary continuity provider and/or primary health care team members  6. Communicated plan of care with: Palliative IDTSandi 192 Team incl bedside RNs     GOALS OF CARE / TREATMENT PREFERENCES:     GOALS OF CARE:  Patient/Health Care Proxy Stated Goals: Rehabilitation    TREATMENT PREFERENCES:   Code Status: Full Code    Advance Care Planning:  [x] [facility] Interdisciplinary Team has updated the ACP Navigator with [address] and Patient Capacity    /  Primary Decision Maker: Kane Perales - [phone]    Advance Care Planning 7/30/2019   Patient's Healthcare Decision Maker is: -   Confirm Advance Directive None       Medical Interventions: Full interventions       Other:    As far as possible, the palliative care team has discussed with patient / health care proxy about goals of care / treatment preferences for patient. HISTORY:     History obtained from: Pt, chart, staff, family     CHIEF COMPLAINT: feeling better    HPI/SUBJECTIVE:    The patient is:   [x] Verbal and participatory  [] Non-participatory due to:     Pt in NAD, comfortable at rest, weak when working w/ therapies. No pain.      Clinical Pain Assessment (nonverbal scale for severity on nonverbal patients):   Clinical Pain Assessment  Severity: 0          Duration: for how long has pt been experiencing pain (e.g., 2 days, 1 month, years)  Frequency: how often pain is an issue (e.g., several times per day, once every few days, constant)     FUNCTIONAL ASSESSMENT:     Palliative Performance Scale (PPS):  PPS: 40       PSYCHOSOCIAL/SPIRITUAL SCREENING:     Palliative IDT has assessed this patient for cultural preferences / practices and a referral made as appropriate to needs (Cultural Services, Patient Advocacy, Ethics, etc.)    Any spiritual / Christian concerns:  [] Yes /  [x] No    Caregiver Burnout:  [] Yes /  [x] No /  [] No Caregiver Present      Anticipatory grief assessment:   [x] Normal  / [] Maladaptive       ESAS Anxiety: Anxiety: 0    ESAS Depression: Depression: 0        REVIEW OF SYSTEMS:     Positive and pertinent negative findings in ROS are noted above in HPI. The following systems were [x] reviewed / [] unable to be reviewed as noted in HPI  Other findings are noted below. Systems: constitutional, ears/nose/mouth/throat, respiratory, gastrointestinal, genitourinary, musculoskeletal, integumentary, neurologic, psychiatric, endocrine. Positive findings noted below. Modified ESAS Completed by: provider   Fatigue: 4 Drowsiness: 0   Depression: 0 Pain: 0   Anxiety: 0 Nausea: 0   Anorexia: 2 Dyspnea: 2           Stool Occurrence(s): 1        PHYSICAL EXAM:     From RN flowsheet:  Wt Readings from Last 3 Encounters:   08/08/19 179 lb 7.3 oz (81.4 kg)   06/22/19 167 lb 8.8 oz (76 kg)   05/21/19 174 lb 8 oz (79.2 kg)     Blood pressure 133/67, pulse (!) 53, temperature 97.3 °F (36.3 °C), resp. rate 18, height 5' 7\" (1.702 m), weight 179 lb 7.3 oz (81.4 kg), SpO2 100 %. Pain Scale 1: Numeric (0 - 10)  Pain Intensity 1: 0                 Last bowel movement, if known:     Constitutional: awake, alert, oriented, NAD at rest   Eyes: pupils equal, anicteric  ENMT: no nasal discharge, moist mucous membranes  Respiratory: breathing not labored  Musculoskeletal: no deformity, no tenderness to palpation  Skin: warm, dry  Neurologic: following commands, moving all extremities  Psychiatric: full affect, no hallucinations         HISTORY:     Active Problems:    Fall (12/21/2015)      Past Medical History:   Diagnosis Date    Cataracts, bilateral     Chronic pain     Diabetes ([address] 2/2/2011    Heart failure ([address]     Hypertension       Past Surgical History:   Procedure Laterality Date    HX HERNIA REPAIR      HX ORTHOPAEDIC      bilat hip replacement    HX PROSTATECTOMY        History reviewed. No pertinent family history. History reviewed, no pertinent family history.   Social History     Tobacco Use    Smoking status: Never Smoker    Smokeless tobacco: Never Used   Substance Use Topics    Alcohol use: No     No Known Allergies   Current Facility-Administered Medications   Medication Dose Route Frequency    bumetanide (BUMEX) tablet 1 mg  1 mg Oral DAILY    potassium chloride SR (KLOR-CON 10) tablet 20 mEq  20 mEq Oral DAILY    hydrALAZINE (APRESOLINE) 20 mg/mL injection 10 mg  10 mg IntraVENous Q6H PRN    therapeutic multivitamin (THERAGRAN) tablet 1 Tab  1 Tab Oral DAILY    timolol (TIMOPTIC) 0.5 % ophthalmic solution 1 Drop  1 Drop Left Eye BID    sodium chloride (NS) flush 5-40 mL  5-40 mL IntraVENous Q8H    sodium chloride (NS) flush 5-40 mL  5-40 mL IntraVENous PRN    acetaminophen (TYLENOL) tablet 650 mg  650 mg Oral Q4H PRN    ondansetron (ZOFRAN ODT) tablet 4 mg  4 mg Oral Q4H PRN    insulin lispro (HUMALOG) injection   SubCUTAneous AC&HS    glucose chewable tablet 16 g  4 Tab Oral PRN    dextrose (D50W) injection syrg 12.5-25 g  12.5-25 g IntraVENous PRN    glucagon (GLUCAGEN) injection 1 mg  1 mg IntraMUSCular PRN          LAB AND IMAGING FINDINGS:     Lab Results   Component Value Date/Time    WBC 6.2 08/08/2019 03:06 AM    HGB 9.4 (L) 08/08/2019 03:06 AM    PLATELET 637 17/34/5279 03:06 AM     Lab Results   Component Value Date/Time    Sodium 142 08/08/2019 03:06 AM    Potassium 4.4 08/08/2019 03:06 AM    Chloride 106 08/08/2019 03:06 AM    CO2 30 08/08/2019 03:06 AM    BUN 37 (H) 08/08/2019 03:06 AM    Creatinine 1.25 08/08/2019 03:06 AM    Calcium 8.5 08/08/2019 03:06 AM    Magnesium 2.1 08/07/2019 02:20 AM    Phosphorus 2.1 (L) 12/20/2015 04:36 AM      Lab Results   Component Value Date/Time    AST (SGOT) 17 08/01/2019 03:18 AM    Alk.  phosphatase 85 08/01/2019 03:18 AM    Protein, total 6.1 (L) 08/01/2019 03:18 AM    Albumin 2.5 (L) 08/01/2019 03:18 AM    Globulin 3.6 08/01/2019 03:18 AM     Lab Results   Component Value Date/Time    INR 1.2 (H) 04/22/2009 10:36 AM    Prothrombin time 11.6 (H) 04/22/2009 10:36 AM    aPTT 23.0 (L) 04/22/2009 10:36 AM      Lab Results   Component Value Date/Time    Iron 28 (L) 06/12/2019 03:55 AM    TIBC 283 06/12/2019 03:55 AM    Iron % saturation 10 (L) 06/12/2019 03:55 AM    Ferritin 46 06/05/2019 01:17 PM      No results found for: PH, PCO2, PO2  No components found for: [place]   Lab Results   Component Value Date/Time     07/29/2019 11:09 PM    CK - MB 12.2 (H) 07/29/2019 04:19 PM                Total time: 50 min   Counseling / coordination time, spent as noted above: 35 min   > 50% counseling / coordination?: yes    Prolonged service was provided for  []30 min   []75 min in face to face time in the presence of the patient, spent as noted above. Time Start:   Time End:   Note: this can only be billed with 10524 (initial) or 57088 (follow up). If multiple start / stop times, list each separately.

## 2019-08-08 NOTE — PROGRESS NOTES
Physical Therapy:    PT attempted to see patient this am for routine therapy session. Patient received in room being transferred to [facility] to go [facility] for ECHO. Will f/u later this afternoon as able and appropriate.     Phil Hoffmann MS, PT

## 2019-08-08 NOTE — PROGRESS NOTES
Events noted. Plans for the patient andhis granddaughter to meet with Palliative care noted  Renal function is stable current diuretic dose and the patient's pulmonary edema has resolved.   Continue present care

## 2019-08-08 NOTE — DIABETES MGMT
[facility]    [facility] Progress Note    Recommendations/ Comments: Chart reviewed due to hypoglycemia. BG 67 mg/dl at 0618. Pt has not received any correction x 24 hours. If appropriate, please consider providing HS snack and change lispro correction to high sensitivity. Current hospital DM medication:  Correction scale Humalog with normal sensitivity. Chart reviewed on [facility]. Patient is a 80 y.o. male with known diabetes on no diabetes medications at home. A1c:   Lab Results   Component Value Date/Time    Hemoglobin A1c <3.5 (L) 06/12/2019 03:50 AM       Recent Glucose Results:   Lab Results   Component Value Date/Time    GLU 66 08/08/2019 03:06 AM    GLUCPOC 88 08/08/2019 11:21 AM    GLUCPOC 91 08/08/2019 06:37 AM    GLUCPOC 67 08/08/2019 06:18 AM        Lab Results   Component Value Date/Time    Creatinine 1.25 08/08/2019 03:06 AM     Estimated Creatinine Clearance: 40.9 mL/min (based on SCr of 1.25 mg/dL). Active Orders   Diet    DIET DIABETIC CONSISTENT CARB Regular; 2 GM NA (House Low NA)        PO intake:   No data found. Will continue to follow as needed.     Thank you  Isabel Walls RD, Diabetes Clinician       Time spent: 4 minutes

## 2019-08-08 NOTE — PROGRESS NOTES
Transitions of Care plan:  -[facility] is able to hold authorization through tomorrow (8/9/2019)  -Patient had an echo today and we are waiting on the results to be read  -Palliative care has been consulted and the family will need to meet with them as well.     Denis SHAFFERW, ACM

## 2019-08-08 NOTE — PROGRESS NOTES
Problem: Falls - Risk of  Goal: *Absence of Falls  Description  Document Janki Salazar Fall Risk and appropriate interventions in the flowsheet. Outcome: Progressing Towards Goal  Note:   Fall Risk Interventions:  Mobility Interventions: Communicate number of staff needed for ambulation/transfer, Patient to call before getting OOB    Mentation Interventions: Adequate sleep, hydration, pain control, Door open when patient unattended, Increase mobility, More frequent rounding, Toileting rounds, Update white board    Medication Interventions: Evaluate medications/consider consulting pharmacy, Patient to call before getting OOB    Elimination Interventions: Call light in reach, Patient to call for help with toileting needs, Toileting schedule/hourly rounds, Urinal in reach    History of Falls Interventions: Consult care management for discharge planning, Door open when patient unattended         Problem: Pressure Injury - Risk of  Goal: *Prevention of pressure injury  Description  Document Eitan Scale and appropriate interventions in the flowsheet. Outcome: Progressing Towards Goal  Note:   Pressure Injury Interventions:  Sensory Interventions: Check visual cues for pain, Discuss PT/OT consult with provider, Float heels, Keep linens dry and wrinkle-free, Maintain/enhance activity level, Minimize linen layers, Monitor skin under medical devices, Pad between skin to skin, Pressure redistribution bed/mattress (bed type), Turn and reposition approx.  every two hours (pillows and wedges if needed)    Moisture Interventions: Absorbent underpads, Apply protective barrier, creams and emollients, Check for incontinence Q2 hours and as needed, Maintain skin hydration (lotion/cream), Minimize layers, Moisture barrier, Offer toileting Q_hr    Activity Interventions: Increase time out of bed, Pressure redistribution bed/mattress(bed type), PT/OT evaluation    Mobility Interventions: Float heels, HOB 30 degrees or less, Pressure redistribution bed/mattress (bed type), PT/OT evaluation, Turn and reposition approx. every two hours(pillow and wedges)    Nutrition Interventions: Document food/fluid/supplement intake, Offer support with meals,snacks and hydration    Friction and Shear Interventions: Apply protective barrier, creams and emollients, HOB 30 degrees or less, Lift sheet, Lift team/patient mobility team, Minimize layers                Problem: Patient Education: Go to Patient Education Activity  Goal: Patient/Family Education  Outcome: Progressing Towards Goal     Problem: Diabetes Self-Management  Goal: *Disease process and treatment process  Description  Define diabetes and identify own type of diabetes; list 3 options for treating diabetes. Outcome: Progressing Towards Goal  Goal: *Incorporating nutritional management into lifestyle  Description  Describe effect of type, amount and timing of food on blood glucose; list 3 methods for planning meals. Outcome: Progressing Towards Goal  Goal: *Incorporating physical activity into lifestyle  Description  State effect of exercise on blood glucose levels. Outcome: Progressing Towards Goal  Goal: *Developing strategies to promote health/change behavior  Description  Define the ABC's of diabetes; identify appropriate screenings, schedule and personal plan for screenings. Outcome: Progressing Towards Goal  Goal: *Using medications safely  Description  State effect of diabetes medications on diabetes; name diabetes medication taking, action and side effects. Outcome: Progressing Towards Goal  Goal: *Monitoring blood glucose, interpreting and using results  Description  Identify recommended blood glucose targets  and personal targets.   Outcome: Progressing Towards Goal     Problem: Patient Education: Go to Patient Education Activity  Goal: Patient/Family Education  Outcome: Progressing Towards Goal     Problem: Hypertension  Goal: *Blood pressure within specified parameters  Outcome: Progressing Towards Goal  Goal: *Fluid volume balance  Outcome: Progressing Towards Goal  Goal: *Labs within defined limits  Outcome: Progressing Towards Goal     Problem: Patient Education: Go to Patient Education Activity  Goal: Patient/Family Education  Outcome: Progressing Towards Goal

## 2019-08-08 NOTE — PROGRESS NOTES
Occupational therapy 1005 -   08.08.2019    Chart reviewed in prep for OT treatment, patient being transferred to [facility] to go TIFFANIE for ECHO. Will defer and f/u later in PM as able and appropriate. Thank you. @2140 - patient currently eating lunch, will defer and f/u as able and appropriate. Shi Izquierdo MS, OTR/L

## 2019-08-08 NOTE — PALLIATIVE CARE
Palliative Medicine Social Work    ADDENDUM:  Dr. Matthew Mobley and I met with patient later in the afternoon. He was alert and engaging. Related his confusion on plans for surgery. We confirmed there has been confusing conversation around it; that he is a poor candidate and would need rehab first; now not considered a candidate. Discussed a plan to talk more with him and family about a plan. He confirmed his trust in granddaughter, Luis Rodriguez, the most.  He states all his children have . He has a sister, Price Ibarra ([phone]) and good friends who are also supportive. Called Josephine to arrange family meeting for Friday at [place] [address].        Mr. Aydin Josue is an 80year old gentleman with a history significant for HTN, DM2, CHF, CKD3 and severe aortic stenosis. He had recent admission here from - for anemia, aortic stenosis, pulmonary edema and KEIKO. Patient's aortic stenosis prevented EGD and colonoscopy; balloon valvuloplasty was postponed due to KEIKO. He was admitted on  s/p unwitnessed fall and complaints of knee pain. Patient is known to Dr. Lethia Riedel who has offered TAVR as possible intervention; patient has vacillated on decision in the past; now with worsening dyspnea secondary to pulmonary edema; may be too debilitated for procedure. Patient has no AMD on file. His granddaughter, Mela Mccormick ([phone]) is listed in demographics. Palliative was consulted to assist with decisions regarding high risk procedure vs. Medical management with diuretics and BP control. Will follow up. Thank you for the opportunity to be involved in the care of Mr. Aydin Josue. Bekah Shea, KRISTYW, [facility]-  Palliative Medicine   [employer] ® ACP Facilitator   [phone]

## 2019-08-08 NOTE — PROGRESS NOTES
Problem: Mobility Impaired (Adult and Pediatric)  Goal: *Acute Goals and Plan of Care (Insert Text)  Description  Physical Therapy Goals  Reviewed on 8/8/19, downgraded    1. Patient will move from supine to sit and sit to supine , scoot up and down and roll side to side in bed with minimal assistance within 7 day(s). 2.  Patient will transfer from bed to chair and chair to bed once in standing with minimal assistance x 2 using the least restrictive device within 7 day(s). 3.  Patient will perform sit to stand with minimal assistance x 2 from elevated seat height within 7 day(s). 4.  Patient will ambulate with minimal asssitance for 50 feet with the least restrictive device within 7 day(s). 5.  Patient will ascend/descend one stairs with no handrail(s), uses his walker with modified independence within 7 day(s). 6.  Patient will participate in a six minute walk test within 48 hours prior to discharge. Initiated 8/1/2019  1. Patient will move from supine to sit and sit to supine , scoot up and down and roll side to side in bed with minimal assistance/contact guard assist within 7 day(s). 2.  Patient will transfer from bed to chair and chair to bed once in standing with modified independence using the least restrictive device within 7 day(s). 3.  Patient will perform sit to stand with modified independence from elevated seat height within 7 day(s). 4.  Patient will ambulate with modified independence for 80 feet with the least restrictive device within 7 day(s). 5.  Patient will ascend/descend one stairs with no handrail(s), uses his walker with modified independence within 7 day(s). 6.  Patient will participate in a six minute walk test within 48 hours prior to discharge. Outcome: Progressing Towards Goal   PHYSICAL THERAPY TREATMENT: WEEKLY REASSESSMENT  Patient: Bright Moran (35 y.o. male)  Date: 8/8/2019  Primary Diagnosis: Fall [W19. XXXA]  Fall [W19. XXXA]  Fall [W19. Ray Seay [W19.XXXA]  Procedure(s) (LRB):  LEFT AND RIGHT HEART CATH / CORONARY ANGIOGRAPHY (N/A) 8 Days Post-Op   Precautions:   Fall      ASSESSMENT  Based on the objective data described below, the patient presents with minimal progress with functional mobility independence and tolerance since evaluation. Patient continues to be very motivated and follows commands well. Primarily limited by trunk rigidity and inability to flex hips to 90 deg in sitting. Sitting EOB requires very close SBA with application of counter force to feet to prevent sliding forward as he fatigues and during any dynamic activity. Standing requires MAX A x 2 persons and additional time for patient to transition feet under his body until he is able to stand with CGA to MIN A. Once in standing with cues to march, patient's standing balance and posture improves. He does exhibit intermittent posterior lean during gait and more so with transfers, further gait deferred but would try to attempt this with someone following behind with a chair for safety. Once transferred to one chair today, patient demonstrated sliding forward when adjusting his position, repositioned by PT and then scooted forward again. Patient assisted back to bed 2/2 safety concern leaving him in the chair. Discussed with RN, may need recliner or chair that has posterior seat lower than anterior. Patient's progression toward goals since last assessment: minimal, goals downgraded    Current Level of Function Impacting Discharge (mobility/balance): MOD A x 2 for bed mobility, close SBA to CGA for sitting EOB, MAX A x 2 to come to standing with RW, gait up to 12' with RW and MIN A x 2    Functional Outcome Measure: The patient scored 0 on the TUG outcome measure which is indicative of high fall risk.       Other factors to consider for discharge: patient was living alone PTA, unsafe to d/c home as requiring MAX A x 2 persons         PLAN :  Goals have been updated based on progression since last assessment. Patient continues to benefit from skilled intervention to address the above impairments. Recommendations and Planned Interventions: bed mobility training, transfer training, gait training, therapeutic exercises, neuromuscular re-education, patient and family training/education and therapeutic activities      Frequency/Duration: Patient will be followed by physical therapy:  5 times a week to address goals. Recommendation for discharge: (in order for the patient to meet his/her long term goals)  Therapy up to 5 days/week in SNF setting    This discharge recommendation:  Has been made in collaboration with the attending provider and/or case management    Equipment recommendations for successful discharge (if) home: to be determined by rehab facility         SUBJECTIVE:   Patient stated My feet keep scooting on me.     OBJECTIVE DATA SUMMARY:   HISTORY:    Past Medical History:   Diagnosis Date    Cataracts, bilateral     Chronic pain     Diabetes ([facility] [address] 2/2/2011    Heart failure ([facility] [address]     Hypertension      Past Surgical History:   Procedure Laterality Date    HX HERNIA REPAIR      HX ORTHOPAEDIC      bilat hip replacement    HX PROSTATECTOMY         Home Situation  Home Environment: Private residence  # Steps to Enter: 1  Rails to Enter: No  One/Two Story Residence: One story  Living Alone: Yes  Support Systems: Family member(s)  Patient Expects to be Discharged to[de-identified] Private residence  Current DME Used/Available at Home: matilde Hansen, [employer], straight(recliner that assists to stand, call pendent, grab bars bath)  Tub or Shower Type: Tub/Shower combination    EXAMINATION/PRESENTATION/DECISION MAKING:   Critical Behavior:  Neurologic State: Alert, Appropriate for age  Orientation Level: Oriented X4  Cognition: Follows commands  Safety/Judgement: Awareness of environment, Fall prevention  Hearing:   Auditory  Auditory Impairment: None    Range Of Motion:  AROM: Grossly decreased, non-functional(hip flexion severely limited when seated)                       Strength:    Strength: Generally decreased, functional                    Tone & Sensation:   Tone: Normal                              Coordination:  Coordination: Generally decreased, functional  Vision:      Functional Mobility:  Bed Mobility:     Supine to Sit: Moderate assistance;Assist x2  Sit to Supine: Maximum assistance;Assist x2     Transfers:  Sit to Stand: Maximum assistance;Assist x2  Stand to Sit: Moderate assistance;Assist x2(prevent feet sliding forward due to impaired hip flex)                       Balance:   Sitting: Impaired  Sitting - Static: Fair (occasional)  Sitting - Dynamic: Poor (constant support)  Standing: Impaired; With support  Standing - Static: Fair(initial stand poor until moves feet under COG)  Standing - Dynamic : Fair  Ambulation/Gait Training:  Distance (ft): 3 Feet (ft)(2 trials)  Assistive Device: Gait belt;Walker, rolling  Ambulation - Level of Assistance: Minimal assistance;Assist x2        Gait Abnormalities: Decreased step clearance        Base of Support: Narrowed     Speed/Seema: Pace decreased (<100 feet/min)  Step Length: Right shortened;Left shortened             Functional Measure:  Timed up and go:    Timed Get Up And Go Test: 0       < than 10 seconds=Normal  Greater then 13.5 seconds (in elderly)=Increased fall risk   Esa Patel [place] M. Predicting the probability for falls in community dwelling older adults using the Timed Up and Go Test. Phys Ther. 2000;80:[id number]. Activity Tolerance:   Poor  Please refer to the flowsheet for vital signs taken during this treatment. After treatment patient left in no apparent distress:   Supine in bed, Call bell within reach and Side rails x 3    COMMUNICATION/EDUCATION:   The patients plan of care was discussed with: Registered Nurse.     Fall prevention education was provided and the patient/caregiver indicated understanding. and Patient/family agree to work toward stated goals and plan of care.     Thank you for this referral.  Anaya Ludwig, PT   Time Calculation: 30 mins

## 2019-08-08 NOTE — PROGRESS NOTES
Hospitalist Progress Note         Please call  and page for questions. Call physician on-call through the  7pm-7am    Daily Progress Note: 8/8/2019    Primary care provider:Yaya Castillo MD    Date of admission: 7/29/2019  2:47 PM    Admission summery and hospital course:  51-year-old with past medical history of chronic pain, diabetes, bilateral cataracts, heart failure, and aortic stenosis, presenting to the hospital because of unwitnessed fall.  Since 7 a.m. in the morning, he had a fall and was lying on the floor and later on at 2 p.m., his granddaughter noticed that, called the EMS where his blood sugar was 68 though he was awake, and that was the reason he was brought to the hospital.  According to him, his knee was hurting and it gave away and that was his reason for fall. He does have a history of arthritis for the same.  Of note, he was recently admitted in 06/2019 for aortic stenosis, pulmonary edema, CKD stage III, had blood transfused.  EGD and colonoscopy could not be done because he has severe aortic stenosis and balloon aortic valvuloplasty was also not done because his kidney function is getting worse. [facility] was sent to the rehabilitation from there, but then he went home just for a weekend, fell and came back to the hospital.    Subjective:   Doing ok . denies sob. Just returned from having echo  Assessment/Plan:   Acute on chronixc hypoxic resp failure, sec to pulm edema in setting of severe AS  -gentle diuresis with kcl.cardio changed lasix to iv bumex 1mg bid and ordered echo  -was 85-86% on 4L 8/5. Per family, no home o2. wean O2 as true. May need o2 on d/c  -cxr 8/5 worsened pulm edema. Repeat cxr 8/6 improved  -pBNP 10,373 on 8/6    History of severe aortic stenosis, balloon aortic valvuloplasty postponed in the past.  Cardiac cath on 07/31 to evaluate for TAVR. CT surgery recommending CTA prior to TAVR.   -per dr solis as of 8/2, would NOT rec further workup for TAVR. will arrange to see in valve clinic, and once he becomes/admits to symptoms, then would get CTA as next step to proceed with TAVR workup  Appreciated Cardiology and CT consult.   Per cardio, pt not be candidate for TAVR given his poor fxnl status and he keeps changing his mind about whether he wants to proceed or not. To f/u o/pt fur further mgmt of this and final decision  echl 8/8 ef 56-62%,DD II, severe AS, mod phtn  Pt to f/u dr Salvatore Matos next wk and dr Corrinne Scarlet on 8/14. To f/u valve clinic  PC c/s by cardio    Hypertension:   Hydralazine as PRN. Add amlodipine if needed.       Fall.  Recurrent. Probably due to age related weakness and medical issue including anemia, bradycardia and aortic stenosis.   Continue telemetry for now. He has fallen 3 times in the last 1 week at home. He went to home from rehab on 07/22.   He might need long-term care at this point in time, only a granddaughter to take care of him as home visit. Patient lives by himself. Continue Physical Therapy/Occupational Therapy.      Hypoglycemia, POA. Blood glucose is reasonable now. Continue to monitor.      Troponin of 0.14. Troponin is stabilized. Patient has no CP, SOB and palpitation. TSH normal.  EKG without significant finding.      Chronic kidney disease stage III.    Kidney function is improving.          Anemia.    Esophagogastroduodenoscopy and colonoscopy not done last time due to high risk  h/h stable   See orders for other plans. VTE prophylaxis: SCD  Code status: Full  Discussed plan of care with Patient/Family and Nurse. Pre-admission lived at home.    Discharge planning:needs SNF/Rehab when ok with cardio and after PC meeting fri at 10am       Review of Systems:     Review of Systems:  Symptom  Y/N  Comments   Symptom  Y/N  Comments    Fever/Chills   n   Chest Pain  n    Poor Appetite  n    Edema  n     Cough  n   Abdominal Pain  n     Sputum  n   Joint Pain      SOB/JOSUE  n   Pruritis/Rash Nausea/vomit  n   Tolerating PT/OT      Diarrhea     Tolerating Diet      Constipation     Other      Could not obtain due to:         Objective:   Physical Exam:     Visit Vitals  /67 (BP 1 Location: Right arm, BP Patient Position: At rest)   Pulse (!) 53   Temp 97.3 °F (36.3 °C)   Resp 18   Ht 5' 7\" (1.702 m)   Wt 81.4 kg (179 lb 7.3 oz)   SpO2 100%   BMI 28.11 kg/m²    O2 Flow Rate (L/min): 3 l/min O2 Device: Nasal cannula    Temp (24hrs), Av.7 °F (36.5 °C), Min:97.3 °F (36.3 °C), Max:98.1 °F (36.7 °C)    701 - 1900  In: -   Out: 701 [Urine:775]   1901 -  07  In: -   Out: 1000 [Urine:1000]    General:  Alert, cooperative, no distress, appears stated age. Lungs:   Clear to auscultation bilaterally. Heart:  Regular rate and rhythm, S1, S2 normal, Systolic murmur present.    Abdomen:   Soft, non-tender. Bowel sounds normal.    Extremities: Extremities normal, atraumatic, no cyanosis or edema. Pulses: 2+ and symmetric all extremities. Skin: Skin color, texture, turgor normal. No rashes or lesions   Neurologic: Patient is talking and communicating appropriately. Patient follows the command well. Patient is moving all his extremities        Data Review:       Recent Days:  Recent Labs     19  0306   WBC 6.2   HGB 9.4*   HCT 31.0*        Recent Labs     19  0306 19  0220 19  0250    143 145   K 4.4 4.1 4.0    106 108   CO2 30 31 30   GLU 66 86 66   BUN 37* 34* 28*   CREA 1.25 1.37* 1.25   CA 8.5 8.4* 8.5   MG  --  2.1 2.0     No results for input(s): PH, PCO2, PO2, HCO3, FIO2 in the last 72 hours.     24 Hour Results:  Recent Results (from the past 24 hour(s))   GLUCOSE, POC    Collection Time: 19  9:35 PM   Result Value Ref Range    Glucose (POC) 99 65 - 100 mg/dL    Performed by Andreas Holt    CBC WITH AUTOMATED DIFF    Collection Time: 19  3:06 AM   Result Value Ref Range    WBC 6.2 4.1 - 11.1 K/uL    RBC 3.29 (L) 4.10 - 5.70 M/uL    HGB 9.4 (L) 12.1 - 17.0 g/dL    HCT 31.0 (L) 36.6 - 50.3 %    MCV 94.2 80.0 - 99.0 FL    MCH 28.6 26.0 - 34.0 PG    MCHC 30.3 30.0 - 36.5 g/dL    RDW 17.2 (H) 11.5 - 14.5 %    PLATELET 513 066 - 530 K/uL    MPV 10.2 8.9 - 12.9 FL    NRBC 0.0 0  WBC    ABSOLUTE NRBC 0.00 0.00 - 0.01 K/uL    NEUTROPHILS 65 32 - 75 %    LYMPHOCYTES 17 12 - 49 %    MONOCYTES 13 5 - 13 %    EOSINOPHILS 5 0 - 7 %    BASOPHILS 0 0 - 1 %    IMMATURE GRANULOCYTES 0 0.0 - 0.5 %    ABS. NEUTROPHILS 4.0 1.8 - 8.0 K/UL    ABS. LYMPHOCYTES 1.1 0.8 - 3.5 K/UL    ABS. MONOCYTES 0.8 0.0 - 1.0 K/UL    ABS. EOSINOPHILS 0.3 0.0 - 0.4 K/UL    ABS. BASOPHILS 0.0 0.0 - 0.1 K/UL    ABS. IMM.  GRANS. 0.0 0.00 - 0.04 K/UL    DF AUTOMATED     METABOLIC PANEL, BASIC    Collection Time: 08/08/19  3:06 AM   Result Value Ref Range    Sodium 142 136 - 145 mmol/L    Potassium 4.4 3.5 - 5.1 mmol/L    Chloride 106 97 - 108 mmol/L    CO2 30 21 - 32 mmol/L    Anion gap 6 5 - 15 mmol/L    Glucose 66 65 - 100 mg/dL    BUN 37 (H) 6 - 20 MG/DL    Creatinine 1.25 0.70 - 1.30 MG/DL    BUN/Creatinine ratio 30 (H) 12 - 20      GFR est AA >60 >60 ml/min/1.73m2    GFR est non-AA 54 (L) >60 ml/min/1.73m2    Calcium 8.5 8.5 - 10.1 MG/DL   GLUCOSE, POC    Collection Time: 08/08/19  6:18 AM   Result Value Ref Range    Glucose (POC) 67 65 - 100 mg/dL    Performed by Dennis Park, POC    Collection Time: 08/08/19  6:37 AM   Result Value Ref Range    Glucose (POC) 91 65 - 100 mg/dL    Performed by Carole Old    ECHO ADULT COMPLETE    Collection Time: 08/08/19 10:32 AM   Result Value Ref Range    Tapse 1.82 1.5 - 2.0 cm    Ao Root D 3.26 cm    Aortic Valve Systolic Peak Velocity 562.24 cm/s    AoV .03 cm    Aortic Valve Area by Continuity of Peak Velocity 0.6 cm2    Aortic Valve Area by Continuity of VTI 0.5 cm2    AoV PG 70.5 mmHg    LVIDd 3.46 (A) 4.2 - 5.9 cm    LVPWd 1.56 (A) 0.6 - 1.0 cm    LVIDs 2.50 cm    IVSd 1.64 (A) 0.6 - 1.0 cm LVOT d 1.69 cm    LVOT Peak Velocity 110.00 cm/s    LVOT Peak Gradient 4.8 mmHg    LVOT VTI 25.26 cm    MVA (PHT) 2.5 cm2    MV A Cesar 65.50 cm/s    MV E Cesar 91.06 cm/s    MV E/A 1.39     Left Atrium to Aortic Root Ratio 1.28     Aortic Valve Systolic Mean Gradient 05.1 mmHg    LV Mass .9 (A) 88 - 224 g    LV Mass AL Index 128.9 49 - 115 g/m2    Mitral Valve E Wave Deceleration Time 309.5 ms    Mitral Valve Pressure Half-time 89.8 ms    Left Atrium Major Axis 4.17 cm    Triscuspid Valve Regurgitation Peak Gradient 46.7 mmHg    Pulmonic Valve Max Velocity 66.29 cm/s    LVOT SV 56.8 ml    TR Max Velocity 341.54 cm/s    PV peak gradient 1.8 mmHg    PASP 56.0 mmHg   GLUCOSE, POC    Collection Time: 08/08/19 11:21 AM   Result Value Ref Range    Glucose (POC) 88 65 - 100 mg/dL    Performed by [address], POC    Collection Time: 08/08/19  4:47 PM   Result Value Ref Range    Glucose (POC) 79 65 - 100 mg/dL    Performed by Zenobia Lungemerita    GLUCOSE, POC    Collection Time: 08/08/19  5:08 PM   Result Value Ref Range    Glucose (POC) 86 65 - 100 mg/dL    Performed by Zenobia Lungemerita        Problem List:  Problem List as of 8/8/2019 Date Reviewed: 5/21/2019          Codes Class Noted - Resolved    KEIKO (acute kidney injury) ([facility] ICD-10-CM: N17.9  ICD-9-CM: 584.9  6/19/2019 - Present        Severe anemia ICD-10-CM: D64.9  ICD-9-CM: 285.9  6/19/2019 - Present        Aortic stenosis ICD-10-CM: I35.0  ICD-9-CM: 424.1  6/17/2019 - Present        GIB (gastrointestinal bleeding) ICD-10-CM: K92.2  ICD-9-CM: 578.9  6/17/2019 - Present        Dehydration ICD-10-CM: E86.0  ICD-9-CM: 276.51  6/11/2019 - Present        Acute hypernatremia ICD-10-CM: E87.0  ICD-9-CM: 276.0  6/11/2019 - Present        Abnormal EKG ICD-10-CM: R94.31  ICD-9-CM: 794.31  6/11/2019 - Present        CKD (chronic kidney disease) stage 3, GFR 30-59 ml/min ([facility]) ICD-10-CM: N18.3  ICD-9-CM: 585.3  2/7/2019 - Present        Anemia ICD-10-CM: D64.9  ICD-9-CM: 285.9  2/7/2019 - Present        Severe aortic stenosis ICD-10-CM: I35.0  ICD-9-CM: 424.1  12/21/2015 - Present        Fall ICD-10-CM: W19Rox Lang  ICD-9-CM: E888.9  12/21/2015 - Present        Rhabdomyolysis ICD-10-CM: M62.82  ICD-9-CM: 728.88  12/19/2015 - Present        Cellulitis and abscess of leg, except foot ICD-10-CM: L03.119, L02.419  ICD-9-CM: 682.6  2/2/2011 - Present        Diabetes ([address] ICD-10-CM: E11.9  ICD-9-CM: 250.00  2/2/2011 - Present        Essential hypertension, benign (Chronic) ICD-10-CM: I10  ICD-9-CM: 401.1  2/2/2011 - Present        Hip joint replacement (Chronic) ICD-9-CM: V43.64  2/2/2011 - Present        Morbidly obese (HCC) (Chronic) ICD-10-CM: E66.01  ICD-9-CM: 278.01  2/2/2011 - Present        RESOLVED: Elevated troponin I level ICD-10-CM: R74.8  ICD-9-CM: 790.6  12/19/2015 - 12/21/2015        RESOLVED: Encephalopathy ICD-10-CM: G93.40  ICD-9-CM: 348.30  12/19/2015 - 12/21/2015        RESOLVED: Leukocytosis ICD-10-CM: M21.228  ICD-9-CM: 288.60  12/19/2015 - 12/21/2015              Medications reviewed  Current Facility-Administered Medications   Medication Dose Route Frequency    bumetanide (BUMEX) tablet 1 mg  1 mg Oral DAILY    potassium chloride SR (KLOR-CON 10) tablet 20 mEq  20 mEq Oral DAILY    hydrALAZINE (APRESOLINE) 20 mg/mL injection 10 mg  10 mg IntraVENous Q6H PRN    therapeutic multivitamin (THERAGRAN) tablet 1 Tab  1 Tab Oral DAILY    timolol (TIMOPTIC) 0.5 % ophthalmic solution 1 Drop  1 Drop Left Eye BID    sodium chloride (NS) flush 5-40 mL  5-40 mL IntraVENous Q8H    sodium chloride (NS) flush 5-40 mL  5-40 mL IntraVENous PRN    acetaminophen (TYLENOL) tablet 650 mg  650 mg Oral Q4H PRN    ondansetron (ZOFRAN ODT) tablet 4 mg  4 mg Oral Q4H PRN    insulin lispro (HUMALOG) injection   SubCUTAneous AC&HS    glucose chewable tablet 16 g  4 Tab Oral PRN    dextrose (D50W) injection syrg 12.5-25 g  12.5-25 g IntraVENous PRN    glucagon (GLUCAGEN) injection 1 mg  1 mg IntraMUSCular PRN       Care Plan discussed with: Patient/Family and Nurse    Total time spent with patient: 40 minutes.     Shakila Escalona MD

## 2019-08-09 VITALS
OXYGEN SATURATION: 97 % | HEART RATE: 54 BPM | BODY MASS INDEX: 24.64 KG/M2 | SYSTOLIC BLOOD PRESSURE: 111 MMHG | WEIGHT: 156.97 LBS | HEIGHT: 67 IN | RESPIRATION RATE: 18 BRPM | TEMPERATURE: 97.6 F | DIASTOLIC BLOOD PRESSURE: 54 MMHG

## 2019-08-09 LAB
GLUCOSE BLD STRIP.AUTO-MCNC: 153 MG/DL (ref 65–100)
GLUCOSE BLD STRIP.AUTO-MCNC: 85 MG/DL (ref 65–100)
SERVICE CMNT-IMP: ABNORMAL
SERVICE CMNT-IMP: NORMAL

## 2019-08-09 PROCEDURE — 74011636637 HC RX REV CODE- 636/637: Performed by: INTERNAL MEDICINE

## 2019-08-09 PROCEDURE — 74011250637 HC RX REV CODE- 250/637: Performed by: HOSPITALIST

## 2019-08-09 PROCEDURE — 82962 GLUCOSE BLOOD TEST: CPT

## 2019-08-09 PROCEDURE — 74011250637 HC RX REV CODE- 250/637: Performed by: INTERNAL MEDICINE

## 2019-08-09 PROCEDURE — 74011250637 HC RX REV CODE- 250/637: Performed by: FAMILY MEDICINE

## 2019-08-09 RX ORDER — BUMETANIDE 1 MG/1
1 TABLET ORAL DAILY
Qty: 30 TAB | Refills: 0 | Status: SHIPPED
Start: 2019-08-10

## 2019-08-09 RX ORDER — POTASSIUM CHLORIDE 750 MG/1
20 TABLET, FILM COATED, EXTENDED RELEASE ORAL DAILY
Qty: 30 TAB | Refills: 0 | Status: SHIPPED
Start: 2019-08-10

## 2019-08-09 RX ADMIN — POTASSIUM CHLORIDE 20 MEQ: 750 TABLET, EXTENDED RELEASE ORAL at 09:16

## 2019-08-09 RX ADMIN — INSULIN LISPRO 2 UNITS: 100 INJECTION, SOLUTION INTRAVENOUS; SUBCUTANEOUS at 12:47

## 2019-08-09 RX ADMIN — TIMOLOL MALEATE 1 DROP: 5 SOLUTION OPHTHALMIC at 09:19

## 2019-08-09 RX ADMIN — BUMETANIDE 1 MG: 1 TABLET ORAL at 09:16

## 2019-08-09 RX ADMIN — Medication 10 ML: at 06:00

## 2019-08-09 RX ADMIN — THERA TABS 1 TABLET: TAB at 09:16

## 2019-08-09 NOTE — ROUTINE PROCESS
Bedside and Verbal shift change report given to Basil (oncoming nurse) by Hamlet sadler. Anthony Ward RN (offgoing nurse). Report included the following information SBAR, Kardex, Intake/Output, MAR, Accordion, Recent Results, Med Rec Status and Cardiac Rhythm Bradycardic.

## 2019-08-09 NOTE — PROGRESS NOTES
Transitions of Care plan:  -Cm met with patient and his granddaughter to discuss plan for today as well as his disposition after rehab  -Patient will be going to [facility] today  -After rehab, patient will return home with private duty caregivers  -[facility] transport will  at 4 pm   -Cm will fax discharge instructions once written. -Report can be called to [phone]    S.  [employer] , [place]

## 2019-08-09 NOTE — PALLIATIVE CARE
Palliative Medicine Social Work    Dr. Carlo Rm and I met with patient; his granddaughter, Lisa Mendoza [phone]); and her mother, Katie Balbuena. Patient was alert and engaging. Granddaughter aware that he is considered too high risk for surgery at this time. We talked more about his severe Aortic stenosis and the reality of ongoing worsening, weakening of condition; limited life expectancy. This information was not surprising to patient. We talked about option and plan for rehab upon discharge today and the hope that he can feel a bit strengthened. Discussed, as well, that his strengthening is likely to be short-lived; that he will need 24/7 care once at home. We talked about applying for Medicaid for either LTC or Personal Care in the home. Granddaughter understands that he will likely not qualify for Medicaid upfront and have Spend Down amount. She feels family and [employer] support can likely cover support in home for now. We talked about hospice as beneficial support once he is back home in the context of his limited life expectancy and high risks for readmission to the hospital.   We reviewed the philosophy and goals of hospice care as opposed to continuing to keep MD appointments/returning to the hospital.  Patient not opposed to hospice, but also feels that he may want to keep trying to get stronger. Family will continue to talk about the timing of hospice services. Patient completed an AMD and DDNR during meeting. Care manager and nursing staff informed of information. Thank you for the opportunity to be involved in the care of Mr. Yolanda Romano. Bekah Shea, KRISTYW, [facility]-SW  Palliative Medicine   [employer]pecting Choices ® ACP Facilitator   [phone]

## 2019-08-09 NOTE — DISCHARGE SUMMARY
Discharge Summary       PATIENT ID: Lisa Arriola  MRN: [id number]   YOB: 1929    DATE OF ADMISSION: 7/29/2019  2:47 PM    DATE OF DISCHARGE: 8/9/19  PRIMARY CARE PROVIDER: Maribeth Luke MD     DISCHARGING PROVIDER: Guerline Mace MD    To contact this individual call [phone] and ask the  to page. If unavailable ask to be transferred the Adult Hospitalist Department. CONSULTATIONS: IP CONSULT TO HOSPITALIST  IP CONSULT TO CARDIOLOGY  IP CONSULT TO CARDIAC SURGERY  IP CONSULT TO PALLIATIVE CARE - PROVIDER    PROCEDURES/SURGERIES: Procedure(s):  LEFT AND RIGHT HEART CATH / CORONARY ANGIOGRAPHY    ADMITTING [address] [place] [address] COURSE:   80year-old with past medical history of chronic pain, diabetes, bilateral cataracts, heart failure, and aortic stenosis, presenting to the hospital because of unwitnessed fall.  Since 7 a.m. in the morning, he had a fall and was lying on the floor and later on at 2 p.m., his granddaughter noticed that, called the EMS where his blood sugar was 68 though he was awake, and that was the reason he was brought to the hospital.  According to him, his knee was hurting and it gave away and that was his reason for fall. He does have a history of arthritis for the same.  Of note, he was recently admitted in 06/2019 for aortic stenosis, pulmonary edema, CKD stage III, had blood transfused.  EGD and colonoscopy could not be done because he has severe aortic stenosis and balloon aortic valvuloplasty was also not done because his kidney function is getting worse. Vicki Estrada was sent to the rehabilitation from there, but then he went home just for a weekend, fell and came back to the hospital. Pt was followed by cardio and had improvement with diuresis. It was felt that pt would not be a good candididat for tavr as pt previously chwnged his mind several times and now is too debilitated with withstand such surgery.  He will be d/c to rehab today and f/u in cardio clinic next wk. DISCHARGE DIAGNOSES / PLAN:      Acute on chronixc hypoxic resp failure, sec to pulm edema in setting of severe AS, much improved  -gentle diuresis with kcl.cardio rec po bumex on d/c. Repeat echo without acut change  -was 85-86% on 4L 8/5.  -cxr 8/5 worsened pulm edema. Repeat cxr 8/6 improved  -pBNP 10,373 on 8/6. improved     History of severe aortic stenosis, balloon aortic valvuloplasty postponed in the past.  Cardiac cath on 07/31 to evaluate for TAVR. CT surgery recommending CTA prior to TAVR. -per dr solis as of 8/2, would NOT rec  further workup for TAVR. will arrange to see in valve clinic, and once he becomes/admits to symptoms, then would get CTA as next step to proceed with TAVR workup  Appreciated Cardiology and CT consult.   Per cardio, pt not be candidate for TAVR given his poor fxnl status and he keeps changing his mind about whether he wants to proceed or not. To f/u o/pt fur further mgmt of this and final decision  echl 8/8 ef 56-62%,DD II, severe AS, mod phtn  Pt to f/u dr Angela Miller next wk and dr Shaw Garza on 8/14. To f/u valve clinic  PC c/s by cardio, pt now DNR. If future he declines further then would be a possible hospice candidate     Hypertension:   Hydralazine as PRN. Add amlodipine if needed.       Fall.  Recurrent. Probably due to age related weakness and medical issue including anemia, bradycardia and aortic stenosis.   Continue telemetry for now. He has fallen 3 times in the last 1 week at home. He went to home from rehab on 07/22.   He might need long-term care at this point in time, only a granddaughter to take care of him as home visit. Patient lives by himself. Continue Physical Therapy/Occupational Therapy.      Hypoglycemia, POA. resolved  Blood glucose is reasonable now. Continue to monitor.      Troponin of 0.14. Troponin is stabilized. Patient has no CP, SOB and palpitation.  TSH normal.  EKG without significant finding.      Chronic kidney disease stage III.    Kidney function is improving.           PENDING TEST RESULTS:   At the time of discharge the following test results are still pending: none    FOLLOW UP APPOINTMENTS:    Follow-up Information     Follow up With Specialties Details Why Contact Info    [address] [facility]  [phone]      Sanjay Valle MD Cardiothoracic Surgery On 8/14/2019 09:00 am to discuss TAVR procedure  [address] [facility]  [address] [facility]      Gay Ellsworth MD Internal Medicine   [address]  [address] [facility] [address] [facility]  [phone]      Lexy Lucas MD Cardiology In 1 week  [address] [facility]  [address]  [phone]             ADDITIONAL CARE RECOMMENDATIONS:   Closely monitor glucose levels. Currently normal ut has had intermittent hypoglycemia  Follow up with Dr Alida Ribeiro cardio first if possible before f/u at valve clinic. dr Alida Ribeiro will instruct you as to whether you still need to be seen at valve clinic    DIET: Diabetic Diet  Oral Nutritional Supplements: Glucerna Three times daily    TUBE FEEDING INSTRUCTIONS: none    OXYGEN / BiPAP SETTINGS: none    ACTIVITY: Activity as tolerated    WOUND CARE: none    EQUIPMENT needed: none    DISCHARGE MEDICATIONS:  Current Discharge Medication List      START taking these medications    Details   bumetanide (BUMEX) 1 mg tablet Take 1 Tab by mouth daily. Qty: 30 Tab, Refills: 0      potassium chloride SR (KLOR-CON 10) 10 mEq tablet Take 2 Tabs by mouth daily. Qty: 30 Tab, Refills: 0         CONTINUE these medications which have NOT CHANGED    Details   multivitamin (ONE A DAY) tablet Take 1 Tab by mouth daily. timolol (TIMOPTIC) 0.5 % ophthalmic solution Administer 1 Drop to left eye two (2) times a day.   Qty: 10 mL, Refills: 0               NOTIFY YOUR PHYSICIAN FOR ANY OF THE FOLLOWING: Fever over 101 degrees for 24 hours. Chest pain, shortness of breath, fever, chills, nausea, vomiting, diarrhea, change in mentation, falling, weakness, bleeding. Severe pain or pain not relieved by medications. Or, any other signs or symptoms that you may have questions about. DISPOSITION:    Home With:   OT  PT  HH  RN      x Long term SNF/Inpatient Rehab    Independent/assisted living    Hospice    Other:       PATIENT CONDITION AT DISCHARGE:     Functional status    Poor    x Deconditioned     Independent      Cognition    x Lucid     Forgetful     Dementia      Catheters/lines (plus indication)    West     PICC     PEG    x None      Code status     Full code    x DNR      PHYSICAL EXAMINATION AT DISCHARGE:   Refer to Progress Note      CHRONIC MEDICAL DIAGNOSES:  Problem List as of 8/9/2019 Date Reviewed: 5/21/2019          Codes Class Noted - Resolved    KEIKO (acute kidney injury) ([facility] ICD-10-CM: N17.9  ICD-9-CM: 584.9  6/19/2019 - Present        Severe anemia ICD-10-CM: D64.9  ICD-9-CM: 285.9  6/19/2019 - Present        Aortic stenosis ICD-10-CM: I35.0  ICD-9-CM: 424.1  6/17/2019 - Present        GIB (gastrointestinal bleeding) ICD-10-CM: K92.2  ICD-9-CM: 578.9  6/17/2019 - Present        Dehydration ICD-10-CM: E86.0  ICD-9-CM: 276.51  6/11/2019 - Present        Acute hypernatremia ICD-10-CM: E87.0  ICD-9-CM: 276.0  6/11/2019 - Present        Abnormal EKG ICD-10-CM: R94.31  ICD-9-CM: 794.31  6/11/2019 - Present        CKD (chronic kidney disease) stage 3, GFR 30-59 ml/min ([facility]) ICD-10-CM: N18.3  ICD-9-CM: 585.3  2/7/2019 - Present        Anemia ICD-10-CM: D64.9  ICD-9-CM: 285.9  2/7/2019 - Present        Severe aortic stenosis ICD-10-CM: I35.0  ICD-9-CM: 424.1  12/21/2015 - Present        Fall ICD-10-CM: W19. Noé Dex  ICD-9-CM: E888.9  12/21/2015 - Present        Rhabdomyolysis ICD-10-CM: K86.23  ICD-9-CM: 728.88  12/19/2015 - Present        Cellulitis and abscess of leg, except foot ICD-10-CM: L03.119, L02.419  ICD-9-CM: 682.6  2/2/2011 - Present        Diabetes ([facility] ICD-10-CM: E11.9  ICD-9-CM: 250.00  2/2/2011 - Present        Essential hypertension, benign (Chronic) ICD-10-CM: I10  ICD-9-CM: 401.1  2/2/2011 - Present        Hip joint replacement (Chronic) ICD-9-CM: V43.64  2/2/2011 - Present        Morbidly obese (HCC) (Chronic) ICD-10-CM: E66.01  ICD-9-CM: 278.01  2/2/2011 - Present        RESOLVED: Elevated troponin I level ICD-10-CM: R74.8  ICD-9-CM: 790.6  12/19/2015 - 12/21/2015        RESOLVED: Encephalopathy ICD-10-CM: G93.40  ICD-9-CM: 348.30  12/19/2015 - 12/21/2015        RESOLVED: Leukocytosis ICD-10-CM: I90.341  ICD-9-CM: 288.60  12/19/2015 - 12/21/2015              Greater than 35minutes were spent with the patient on counseling and coordination of care    Signed:   Daniel Joyce MD  8/9/2019  12:16 PM

## 2019-08-09 NOTE — PROGRESS NOTES
Palliative Medicine Consult  Jason: [phone] ([phone])    Patient Name: Glory Terrell  YOB: 1929    Date of Initial Consult: 19  Reason for Consult: Care decisions- to meet w/ pt and granddaughter Josephine  Requesting Provider: Jet Friedman   Primary Care Physician: Marv Fox MD     SUMMARY:   Glory Terrell is a 80 y.o. with a past history of HTN, DM, CKD, severe aortic stenosis who was admitted on 2019 from [facility] rehab after a fall. Blood sugar low. He was here - for anemia and aortic stenosis. AS prevented EGD and colonoscopy and cardiac interventions prevented by KEIKO. went to rehab after. Pt w/ SOB due to pulmonary edema from severe AS, CT surgery not recommending TAVR at this time due to poor functional status and also as he has gone back/forth in his decision. Current medical issues leading to Palliative Medicine involvement include: care decisions. Social: Prior to  admission was living at home alone w/ nima Nenita coming over to help out sometimes. Then went to rehab after last admission and now is quite weak. Children , has a sister. PALLIATIVE DIAGNOSES:   1. Fatigue  2. Generalized weakness  3. Shortness of breath improved  4. Goals of care       PLAN:   1. Along w/ Rudy Arzola LCSW meet w/ pt who is fully alert, Anne-Marie herring and now LissethA and her mother Hunter Storey. 2. See below about our conversation- for Transition of care note for discharge paperwork. 3. Plan:  4. Pt completed AMD and decided on DNR status, DDNR signed. 5. Plan is for rehab at SNF likely today, then f/u with valve clinic and Dr Jet Friedman. Knows PCP and Dr Jet Friedman well and trusts them. 6. In the future, as pt declines in the way we know he will we recommend hospice.  We would even support this after rehab, but likely pt to talk to his PCP and Cardiologist first.   7. Communicated plan of care with: Palliative IDTSandi 192 Team incl bedside RN Kerwin Quick care management, Dr Song Credit Dr Maxim Park    Dear Mr Maty Mathews (and Sandra Sheridan),    It was a pleasure to meet you and be part of your care this hospital stay. -You shared with us that you felt better after rehabilitation and are glad that you are going back. You have been getting weaker overall however, and we know that this in part is due to your severe aortic stenosis (AS) , and that you are too weak for surgical intervention. Cardiology is recommending medical management of your stenosis.     -We support rehab, to get stronger again. However we share our concerns that you will continue have weakness and decline in your functional status because of your heart- and that this will progress with time since the AS cannot be fixed.    -We talk about Hospice in the future, when it feels right. This team would support your symptoms in your home and help you live as well as you can. The focus would be on symptom management in your home. If you start to find yourself declining or if you are in the hospital more than at home- these are indications when Hospice would serve you well. We think that even after rehab Hospice would be appropriate- but we also support you talking with Dr Maxim Park and Dr Frey who you have known for a long time and trust greatly.     -We completed an advanced medical directive naming Sandra Sheridan as your medical power of , to make medical decision on your behalf in the future if you are unable to do so. -We completed a durable Do Not Attempt Resuscitation (DNR) to allow natural death when the time comes. We think you have some good living left to do though!     We wish you well~    Adolfo Guido MD and [facility]      GOALS OF CARE / TREATMENT PREFERENCES:     GOALS OF CARE:  Patient/Health Care Proxy Stated Goals: Rehabilitation    TREATMENT PREFERENCES:   Code Status: DNR    Advance Care Planning:  [x] [facility] Interdisciplinary Team has updated the ACP Navigator with Health Care Decision Maker and Patient Capacity      Primary Decision Maker: Concha Ken Ramah) - Lorrine Klinefelter - [phone]      Advance Care Planning 7/30/2019   Patient's Healthcare Decision Maker is: -   Confirm Advance Directive None       Medical Interventions: Full interventions       Other:    As far as possible, the palliative care team has discussed with patient / health care proxy about goals of care / treatment preferences for patient. HISTORY:       CHIEF COMPLAINT: feeling better    HPI/SUBJECTIVE:    The patient is:   [x] Verbal and participatory  [] Non-participatory due to:     Pt in NAD, comfortable at rest, weak when working w/ bridger Hernandez to leave today. Clinical Pain Assessment (nonverbal scale for severity on nonverbal patients):   Clinical Pain Assessment  Severity: 0          Duration: for how long has pt been experiencing pain (e.g., 2 days, 1 month, years)  Frequency: how often pain is an issue (e.g., several times per day, once every few days, constant)     FUNCTIONAL ASSESSMENT:     Palliative Performance Scale (PPS):  PPS: 40       PSYCHOSOCIAL/SPIRITUAL SCREENING:     Palliative IDT has assessed this patient for cultural preferences / practices and a referral made as appropriate to needs (Cultural Services, Patient Advocacy, Ethics, etc.)    Any spiritual / Scientologist concerns:  [] Yes /  [x] No    Caregiver Burnout:  [] Yes /  [x] No /  [] No Caregiver Present      Anticipatory grief assessment:   [x] Normal  / [] Maladaptive       ESAS Anxiety: Anxiety: 0    ESAS Depression: Depression: 0        REVIEW OF SYSTEMS:     Positive and pertinent negative findings in ROS are noted above in HPI. The following systems were [x] reviewed / [] unable to be reviewed as noted in HPI  Other findings are noted below.   Systems: constitutional, ears/nose/mouth/throat, respiratory, gastrointestinal, genitourinary, musculoskeletal, integumentary, neurologic, psychiatric, endocrine. Positive findings noted below. Modified ESAS Completed by: provider   Fatigue: 4 Drowsiness: 0   Depression: 0 Pain: 0   Anxiety: 0 Nausea: 0   Anorexia: 2 Dyspnea: 2           Stool Occurrence(s): 1        PHYSICAL EXAM:     From RN flowsheet:  Wt Readings from Last 3 Encounters:   08/09/19 156 lb 15.5 oz (71.2 kg)   06/22/19 167 lb 8.8 oz (76 kg)   05/21/19 174 lb 8 oz (79.2 kg)     Blood pressure 121/81, pulse (!) 51, temperature 97.5 °F (36.4 °C), resp. rate 16, height 5' 7\" (1.702 m), weight 156 lb 15.5 oz (71.2 kg), SpO2 99 %. Pain Scale 1: Numeric (0 - 10)  Pain Intensity 1: 0                 Last bowel movement, if known:     Constitutional: awake, alert, oriented, NAD at rest   Eyes: pupils equal, anicteric  ENMT: no nasal discharge, moist mucous membranes  Respiratory: breathing not labored  Musculoskeletal: no deformity, no tenderness to palpation  Skin: warm, dry  Neurologic: following commands, moving all extremities  Psychiatric: full affect, no hallucinations         HISTORY:     Active Problems:    Fall (12/21/2015)      Past Medical History:   Diagnosis Date    Cataracts, bilateral     Chronic pain     Diabetes ([address] 2/2/2011    Heart failure ([facility]     Hypertension       Past Surgical History:   Procedure Laterality Date    HX HERNIA REPAIR      HX ORTHOPAEDIC      bilat hip replacement    HX PROSTATECTOMY        History reviewed. No pertinent family history. History reviewed, no pertinent family history.   Social History     Tobacco Use    Smoking status: Never Smoker    Smokeless tobacco: Never Used   Substance Use Topics    Alcohol use: No     No Known Allergies   Current Facility-Administered Medications   Medication Dose Route Frequency    dextrose 10 % infusion 125-250 mL  125-250 mL IntraVENous PRN    bumetanide (BUMEX) tablet 1 mg  1 mg Oral DAILY    potassium chloride SR (KLOR-CON 10) tablet 20 mEq  20 mEq Oral DAILY    hydrALAZINE (APRESOLINE) 20 mg/mL injection 10 mg  10 mg IntraVENous Q6H PRN    therapeutic multivitamin (THERAGRAN) tablet 1 Tab  1 Tab Oral DAILY    timolol (TIMOPTIC) 0.5 % ophthalmic solution 1 Drop  1 Drop Left Eye BID    sodium chloride (NS) flush 5-40 mL  5-40 mL IntraVENous Q8H    sodium chloride (NS) flush 5-40 mL  5-40 mL IntraVENous PRN    acetaminophen (TYLENOL) tablet 650 mg  650 mg Oral Q4H PRN    ondansetron (ZOFRAN ODT) tablet 4 mg  4 mg Oral Q4H PRN    insulin lispro (HUMALOG) injection   SubCUTAneous AC&HS    glucose chewable tablet 16 g  4 Tab Oral PRN    glucagon (GLUCAGEN) injection 1 mg  1 mg IntraMUSCular PRN          LAB AND IMAGING FINDINGS:     Lab Results   Component Value Date/Time    WBC 6.2 08/08/2019 03:06 AM    HGB 9.4 (L) 08/08/2019 03:06 AM    PLATELET 239 90/64/4853 03:06 AM     Lab Results   Component Value Date/Time    Sodium 142 08/08/2019 03:06 AM    Potassium 4.4 08/08/2019 03:06 AM    Chloride 106 08/08/2019 03:06 AM    CO2 30 08/08/2019 03:06 AM    BUN 37 (H) 08/08/2019 03:06 AM    Creatinine 1.25 08/08/2019 03:06 AM    Calcium 8.5 08/08/2019 03:06 AM    Magnesium 2.1 08/07/2019 02:20 AM    Phosphorus 2.1 (L) 12/20/2015 04:36 AM      Lab Results   Component Value Date/Time    AST (SGOT) 17 08/01/2019 03:18 AM    Alk.  phosphatase 85 08/01/2019 03:18 AM    Protein, total 6.1 (L) 08/01/2019 03:18 AM    Albumin 2.5 (L) 08/01/2019 03:18 AM    Globulin 3.6 08/01/2019 03:18 AM     Lab Results   Component Value Date/Time    INR 1.2 (H) 04/22/2009 10:36 AM    Prothrombin time 11.6 (H) 04/22/2009 10:36 AM    aPTT 23.0 (L) 04/22/2009 10:36 AM      Lab Results   Component Value Date/Time    Iron 28 (L) 06/12/2019 03:55 AM    TIBC 283 06/12/2019 03:55 AM    Iron % saturation 10 (L) 06/12/2019 03:55 AM    Ferritin 46 06/05/2019 01:17 PM      No results found for: PH, PCO2, PO2  No components found for: [place]   Lab Results   Component Value Date/Time     07/29/2019 11:09 PM    CK - MB 12.2 (H) 07/29/2019 04:19 PM                Total time: 70 min   Counseling / coordination time, spent as noted above: 55 min   > 50% counseling / coordination?: yes    Prolonged service was provided for  [x]30 min   []75 min in face to face time in the presence of the patient, spent as noted above. Time Start: 955am  Time End: 1045am  Note: this can only be billed with [id number] (initial) or [id number] (follow up). If multiple start / stop times, list each separately.

## 2019-08-09 NOTE — PALLIATIVE CARE DISCHARGE
Goals of Care/Treatment Preferences The Palliative Medicine team was consulted as part of your/your loved one's care in the hospital. Our team is a supportive service; we strive to relieve suffering and improve quality of life. Dear Mr Peewee Stuart (and Paty), It was a pleasure to meet you and be part of your care this hospital stay. -You shared with us that you felt better after rehabilitation and are glad that you are going back. You have been getting weaker overall however, and we know that this in part is due to your severe aortic stenosis (AS) , and that you are too weak for surgical intervention. Cardiology is recommending medical management of your stenosis.  
 
-We support rehab, to get stronger again. However we share our concerns that you will continue have weakness and decline in your functional status because of your heart- and that this will progress with time since the AS cannot be fixed. 
 
-We talk about Hospice in the future, when it feels right. This team would support your symptoms in your home and help you live as well as you can. The focus would be on symptom management in your home. If you start to find yourself declining or if you are in the hospital more than at home- these are indications when Hospice would serve you well. We think that even after rehab Hospice would be appropriate- but we also support you talking with Dr Ayde Cardenas and Dr Deana Garcia who you have known for a long time and trust greatly.  
 
-We completed an advanced medical directive naming [facility] as your medical power of , to make medical decision on your behalf in the future if you are unable to do so. -We completed a durable Do Not Attempt Resuscitation (DNR) to allow natural death when the time comes. We think you have some good living left to do though! We wish you well~ Lewis Hay MD and [facility]

## 2019-08-09 NOTE — ACP (ADVANCE CARE PLANNING)
Primary Decision Maker: Zoe Hutson Sterling) - Grandchild - [phone]    Secondary Decision Maker: Penelope Patton ([facility]) - Other Relative - [phone]     Patient completed and AMD today designating the above agents. He also completed Living Will section and DDNR stating his desire for no life prolonging treatments when death is imminent or he has lost his mental capacity. He does not want efforts at resuscitation.

## 2019-08-09 NOTE — PROGRESS NOTES
Pt stable for discharge to [facility], report called to Fabricio Guo LPN @ [phone] and he denies any further questions.

## 2019-08-09 NOTE — DISCHARGE INSTRUCTIONS
Discharge SNF/Rehab Instructions/LTAC       PATIENT ID: Randy Simmons  MRN: [id number]   YOB: 1929    DATE OF ADMISSION: 7/29/2019  2:47 PM    DATE OF DISCHARGE: 8/9/2019    PRIMARY CARE PROVIDER: May John MD       ATTENDING PHYSICIAN: Latonia Boyce MD  DISCHARGING PROVIDER: Yasmin Huffman MD     To contact this individual call [phone] and ask the  to page. If unavailable ask to be transferred the Adult Hospitalist Department. CONSULTATIONS: IP CONSULT TO HOSPITALIST  IP CONSULT TO CARDIOLOGY  IP CONSULT TO CARDIAC SURGERY  IP CONSULT TO PALLIATIVE CARE - PROVIDER    PROCEDURES/SURGERIES: Procedure(s):  LEFT AND RIGHT HEART CATH / CORONARY ANGIOGRAPHY    ADMITTING [facility] COURSE:   80year-old with past medical history of chronic pain, diabetes, bilateral cataracts, heart failure, and aortic stenosis, presenting to the hospital because of unwitnessed fall.  Since 7 a.m. in the morning, he had a fall and was lying on the floor and later on at 2 p.m., his granddaughter noticed that, called the EMS where his blood sugar was 68 though he was awake, and that was the reason he was brought to the hospital.  According to him, his knee was hurting and it gave away and that was his reason for fall. He does have a history of arthritis for the same.  Of note, he was recently admitted in 06/2019 for aortic stenosis, pulmonary edema, CKD stage III, had blood transfused.  EGD and colonoscopy could not be done because he has severe aortic stenosis and balloon aortic valvuloplasty was also not done because his kidney function is getting worse. [facility] was sent to the rehabilitation from there, but then he went home just for a weekend, fell and came back to the hospital. Pt was followed by cardio and had improvement with diuresis.  It was felt that pt would not be a good candididat for tavr as pt previously changed his mind several times and now is too debilitated with withstand such surgery. He will be d/c to rehab today and f/u in cardio clinic next wk. DISCHARGE DIAGNOSES / PLAN:      Acute on chronixc hypoxic resp failure, sec to pulm edema in setting of severe AS, much improved  -gentle diuresis with kcl.cardio rec po bumex on d/c. Repeat echo without acut change  -was 85-86% on 4L 8/5.  -cxr 8/5 worsened pulm edema. Repeat cxr 8/6 improved  -pBNP 10,373 on 8/6. improved     History of severe aortic stenosis, balloon aortic valvuloplasty postponed in the past.  Cardiac cath on 07/31 to evaluate for TAVR. CT surgery recommending CTA prior to TAVR. -per dr solis as of 8/2, would NOT rec  further workup for TAVR. will arrange to see in valve clinic, and once he becomes/admits to symptoms, then would get CTA as next step to proceed with TAVR workup  Appreciated Cardiology and CT consult.   Per cardio, pt not be candidate for TAVR given his poor fxnl status and he keeps changing his mind about whether he wants to proceed or not. To f/u o/pt fur further mgmt of this and final decision  echl 8/8 ef 56-62%,DD II, severe AS, mod phtn  Pt to f/u dr Day Decker next wk and dr Missy Edwards on 8/14. To f/u valve clinic  PC c/s by cardio, pt now DNR. If future he declines further then would be a possible hospice candidate     Hypertension:   Hydralazine as PRN. Add amlodipine if needed.       Fall.  Recurrent. Probably due to age related weakness and medical issue including anemia, bradycardia and aortic stenosis.   Continue telemetry for now. He has fallen 3 times in the last 1 week at home. He went to home from rehab on 07/22.   He might need long-term care at this point in time, only a granddaughter to take care of him as home visit. Patient lives by himself. Continue Physical Therapy/Occupational Therapy.      Hypoglycemia, POA. resolved  Blood glucose is reasonable now. Continue to monitor.      Troponin of 0.14. Troponin is stabilized.    Patient has no CP, SOB and palpitation. TSH normal.  EKG without significant finding.      Chronic kidney disease stage III.    Kidney function is improving.     1. PENDING TEST RESULTS:   At the time of discharge the following test results are still pending: none    FOLLOW UP APPOINTMENTS:    Follow-up Information     Follow up With Specialties Details Why Contact Info    Pancho [facility] [address] Lisa CATALAN [facility]  [phone]      Anatoliy Rivas MD Cardiothoracic Surgery On 8/14/2019 09:00 am to discuss TAVR procedure  [address] [facility]  [address] [facility] [address] [facility]      Beth Lafleur MD Internal Medicine   [address]  [address] [facility] 11 [facility]  [phone]      Rosi Rosales MD Cardiology In 1 week  [address] [facility]  200 [facility]  [phone]             ADDITIONAL CARE RECOMMENDATIONS:   Closely monitor glucose levels. Currently normal ut has had intermittent hypoglycemia  Follow up with Dr Graham Baeza cardio first if possible before f/u at valve clinic. dr Grhaam Baeza will instruct you as to whether you still need to be seen at valve clinic    DIET: Diabetic Diet  Oral Nutritional Supplements: Glucerna Three times daily    TUBE FEEDING INSTRUCTIONS: none    OXYGEN / BiPAP SETTINGS: none    ACTIVITY: Activity as tolerated    WOUND CARE: none    EQUIPMENT needed: none      DISCHARGE MEDICATIONS:   See Medication Reconciliation Form      NOTIFY YOUR PHYSICIAN FOR ANY OF THE FOLLOWING:   Fever over 101 degrees for 24 hours. Chest pain, shortness of breath, fever, chills, nausea, vomiting, diarrhea, change in mentation, falling, weakness, bleeding. Severe pain or pain not relieved by medications. Or, any other signs or symptoms that you may have questions about.     DISPOSITION:   x Home With:   OT  PT  HH  RN      x SNF/Inpatient Rehab/LTAC    Independent/assisted living    Hospice    Other: PATIENT CONDITION AT DISCHARGE:     Functional status    Poor    x Deconditioned     Independent      Cognition   x  Lucid     Forgetful     Dementia      Catheters/lines (plus indication)    West     PICC     PEG    x None      Code status     Full code    x DNR      PHYSICAL EXAMINATION AT DISCHARGE:   Refer to Progress Note      CHRONIC MEDICAL DIAGNOSES:  Problem List as of 8/9/2019 Date Reviewed: 5/21/2019          Codes Class Noted - Resolved    KEIKO (acute kidney injury) ([facility] ICD-10-CM: N17.9  ICD-9-CM: 584.9  6/19/2019 - Present        Severe anemia ICD-10-CM: D64.9  ICD-9-CM: 285.9  6/19/2019 - Present        Aortic stenosis ICD-10-CM: I35.0  ICD-9-CM: 424.1  6/17/2019 - Present        GIB (gastrointestinal bleeding) ICD-10-CM: K92.2  ICD-9-CM: 578.9  6/17/2019 - Present        Dehydration ICD-10-CM: E86.0  ICD-9-CM: 276.51  6/11/2019 - Present        Acute hypernatremia ICD-10-CM: E87.0  ICD-9-CM: 276.0  6/11/2019 - Present        Abnormal EKG ICD-10-CM: R94.31  ICD-9-CM: 794.31  6/11/2019 - Present        CKD (chronic kidney disease) stage 3, GFR 30-59 ml/min ([facility]) ICD-10-CM: N18.3  ICD-9-CM: 585.3  2/7/2019 - Present        Anemia ICD-10-CM: D64.9  ICD-9-CM: 285.9  2/7/2019 - Present        Severe aortic stenosis ICD-10-CM: I35.0  ICD-9-CM: 424.1  12/21/2015 - Present        Fall ICD-10-CM: W19. Frank Les  ICD-9-CM: E888.9  12/21/2015 - Present        Rhabdomyolysis ICD-10-CM: X41.55  ICD-9-CM: 728.88  12/19/2015 - Present        Cellulitis and abscess of leg, except foot ICD-10-CM: L03.119, L02.419  ICD-9-CM: 682.6  2/2/2011 - Present        Diabetes ([address] ICD-10-CM: E11.9  ICD-9-CM: 250.00  2/2/2011 - Present        Essential hypertension, benign (Chronic) ICD-10-CM: I10  ICD-9-CM: 401.1  2/2/2011 - Present        Hip joint replacement (Chronic) ICD-9-CM: V43.64  2/2/2011 - Present        Morbidly obese (HCC) (Chronic) ICD-10-CM: E66.01  ICD-9-CM: 278.01  2/2/2011 - Present        RESOLVED: Elevated troponin I level ICD-10-CM: R74.8  ICD-9-CM: 790.6  12/19/2015 - 12/21/2015        RESOLVED: Encephalopathy ICD-10-CM: G93.40  ICD-9-CM: 348.30  12/19/2015 - 12/21/2015        RESOLVED: Leukocytosis ICD-10-CM: G28.915  ICD-9-CM: 288.60  12/19/2015 - 12/21/2015                CDMP Checked:   Yes x     PROBLEM LIST Updated:  Yes x         Signed:   Blanca Velázquez MD  8/9/2019  12:12 PM                 Cardiac Surgery Specialist    [address] [place], [place] [address] [place] [address] [address]  Office- [phone]  Fax- [phone]       Office- [phone]  Fax- [phone]  _____________________________________________________________  Dr. Sheral Gory Dr. Lennie Dalton Dr. Trudee Marina Dr. Collette Colt [facility]   Yajaira Mota [facility] Reyna PA-C [place], [place]  ______________________________________________________________    Name:Alber Harrell have an appointment to discuss options for replacing your aortic valve with our valve team and Dr. Missy Edwards  8-62-41 at 0900am.  Please call our office with questions or if you cannot make this appointment.

## 2019-08-19 ENCOUNTER — TELEPHONE (OUTPATIENT)
Dept: PALLATIVE CARE | Age: 84
End: 2019-08-19

## 2019-08-19 NOTE — TELEPHONE ENCOUNTER
I received a call from patient's granddaughter and Yared Santos [place] ([phone]). Patient was seen by our team on last admission. We talked about hospice as an option when he returned home from SNF. She informed that he is now home, and she would like to look into having hospice. She has not yet secured 24/7 care, but states he is up and about; mostly able to manage his care. She has some people who will be looking in on him during the day. She does not have the ability to move him into her home. She is trying to fulfill her promise to keep him in his own home through end of life. Talked frankly about the reality that his functional status will change over time; that it may change quite acutely, given his aortic stenosis. Discussed the importance of beginning to put some plans in place now to attend to that need for 24/7 coverage. We had discussed applying for Medicaid for benefit of Personal Care in the home; LTC if needed. She is anxious about applying; knows he has too many resources right now and will need to spend down an amount. She is open to having hospice come and talk to her more about the care they can provide in the home. She would benefit from some guidance of hospice social worker regarding Medicaid or using his resources toward hiring help in the home. Will forward this information to PCP so that they can place a hospice consult.

## 2019-10-31 ENCOUNTER — HOSPITAL ENCOUNTER (OUTPATIENT)
Dept: WOUND CARE | Age: 84
Discharge: HOME | End: 2019-10-31
Payer: MEDICARE

## 2019-10-31 VITALS
SYSTOLIC BLOOD PRESSURE: 116 MMHG | DIASTOLIC BLOOD PRESSURE: 68 MMHG | RESPIRATION RATE: 18 BRPM | HEART RATE: 58 BPM | TEMPERATURE: 97.8 F

## 2019-10-31 PROBLEM — I87.312 IDIOPATHIC CHRONIC VENOUS HYPERTENSION OF LEFT LOWER EXTREMITY WITH ULCER (HCC): Status: ACTIVE | Noted: 2019-10-31

## 2019-10-31 PROBLEM — L97.921 NON-PRESSURE CHRONIC ULCER OF LEFT LOWER LEG, LIMITED TO BREAKDOWN OF SKIN (HCC): Status: ACTIVE | Noted: 2019-10-31

## 2019-10-31 PROBLEM — L97.929 IDIOPATHIC CHRONIC VENOUS HYPERTENSION OF LEFT LOWER EXTREMITY WITH ULCER (HCC): Status: ACTIVE | Noted: 2019-10-31

## 2019-10-31 PROBLEM — I89.0 SECONDARY LYMPHEDEMA: Status: ACTIVE | Noted: 2019-10-31

## 2019-10-31 PROCEDURE — 99213 OFFICE O/P EST LOW 20 MIN: CPT

## 2019-10-31 PROCEDURE — 29581 APPL MULTLAYER CMPRN SYS LEG: CPT

## 2019-10-31 PROCEDURE — 74011000250 HC RX REV CODE- 250: Performed by: OTOLARYNGOLOGY

## 2019-10-31 RX ORDER — FUROSEMIDE 40 MG/1
40 TABLET ORAL DAILY
COMMUNITY

## 2019-10-31 RX ORDER — SIMVASTATIN 20 MG/1
20 TABLET, FILM COATED ORAL
COMMUNITY

## 2019-10-31 RX ADMIN — Medication: at 16:00

## 2019-10-31 NOTE — WOUND CARE
10/31/19 1607 Wound Leg lower Left; Lower; Lateral  
Date First Assessed/Time First Assessed: 10/31/19 1455   Present on Hospital Admission: Yes  Wound Approximate Age at First Assessment (Weeks): 3 weeks  Location: Leg lower  Wound Location Orientation: Left; Lower; Lateral  
Dressing Changed Changed/New Dressing Type Applied Alginate; Compression Wrap/Venous Stasis;Silver products Procedure Tolerated Well Discharge Condition: Stable Pain: 0 Ambulatory Status: Walking and Walker Discharge Destination: Home Transportation: Car Accompanied by: Self  and Family/Caregiver Discharge instructions reviewed with Patient and Family/Caregiver  and copy or written instructions have been provided. All questions/concerns have been addressed at this time.

## 2019-10-31 NOTE — PROGRESS NOTES
10/31/19 1455   Wound Leg lower Left; Lower; Lateral   Date First Assessed/Time First Assessed: 10/31/19 1455   Present on Hospital Admission: Yes  Wound Approximate Age at First Assessment (Weeks): 3 weeks  Location: Leg lower  Wound Location Orientation: Left; Lower; Lateral   Wound Length (cm) 7.5 cm   Wound Width (cm) 7.2 cm   Wound Depth (cm) 0.1 cm   Wound Surface Area (cm^2) 54 cm^2   Wound Volume (cm^3) 5.4 cm^3   Condition of Base Pink   Drainage Amount Small   Drainage Color Serous   Wound Odor Mild   Shandra-wound Assessment Pink     Visit Vitals  /68   Pulse (!) 58   Temp 97.8 °F (36.6 °C)   Resp 18

## 2019-10-31 NOTE — WOUND CARE
Clinic Level of Care Assessment NAME:  Jose Alberto Cm YOB: 1929 GENDER: male MEDICAL RECORD NUMBER:  [id number] DATE:  10/31/2019 Wound Count Document in [facility] Number of Wounds Assessed Points No Wounds/Ulcers []   0 Less than Three Wounds/Ulcers [x]   1  
3-6 Wounds/Ulcers []   2 Greater than 6 Wounds/Ulcers []   3 Ambulation Status Document in Coord/SHARA/Mobility tab Status Definition Points Independent Independently able to ambulate. Fully able (without any assistance) to get on/off exam table/chair. []   0 Minimal Physical Assistance Requires physical assistance of one person to ambulate and/or position patient to be examined. Includes necessary physical assistance to position lower extremities on/off stool. [x]   1 Moderate Physical Assistance Requires at least one staff member to physically assist patient in ambulating into treatment room, and on/off exam table. []   2 Full Assistance Requires assistance of at least two staff members to transfer patient into treatment room and/or on/off exam table/chair. \"Total Transfer\". []   3 Dressing Complexity Document in [facility] and Write Appropriate Order Complexity Definition Points No Dressing  []   0 Simple Minimal, simple dressing. i.e. Band-aid, gauze, simple wrap. []   1 Intermediate Moderately complicated requiring licensed personnel to apply i.e. collagen matrix, ointments, gels, alginates. [x]   2 Complex Complicated requiring licensed personnel to apply dressings 6 or more wounds. []   3 Teaching Effort Document in Education Tab Effort Definition Points No Teaching  []   0 Simple Reinforce two or less topics. Document in Education navigator. [x]   1 Intermediate Reinforce three to five topics and/or one additional  
new topic. Document in Education navigator. []   2 Complex Teach more than one new topic. New patient information  
packet reviewed and/or reinforce more than three topics. Document in Education navigator. HBO initial instruction. []   3 Patient Assessment and Planning Planning Definition Points Simple Multiple System Simple: Simple follow-up with routine assessment and planning. If Discharged, instructions and long term/follow-up care given to patient/caregiver. Discharged, instructions and/or After Visit Summary given to patient/caregiver and instructions completed. []   1 Intermediate Multiple System Intermediate: Contact with outside resources; i.e. Telephone calls to home health, [facility]. May include filling out forms and writing letters, arranging transportation, communication with insurance , vendors, etc.  Discharged, instructions and/or After Visit Summary given to patient/caregiver and instructions completed. [x]   2 Complex Multiple System Complex: Full, comprehensive assessment and planning. Follow the entire navigator under Wound Visit charting filling out each tab which includes OP Adm Database Screening, Education and CarePlan  HBO risk assessment completed. Discharged, instructions and/or After Visit Summary given to patient/caregiver and instructions completed. []   3 Is this the Patient's First Visit to the [facility] [address] Yes Is this Patient Established @ [facility] 
Yes Clinical Level of Care Points  0-2  Level 1 [] Points  3-5  Level 2 [] Points  6-9  Level 3 [x] Points  10-12  Level 4 [] Points  13-15  Level 5 [] Electronically signed by Rebeka Linda RN on 10/31/2019 at 4:08 PM

## 2019-10-31 NOTE — H&P
[address]  HISTORY AND PHYSICAL    Name:  Abigail Rousseau  MR#:  [id number]  :  1929  ACCOUNT #:  [de-identified]  ADMIT DATE:  10/31/2019      HISTORY OF PRESENT ILLNESS:  The patient is an 51-year-old male, who presents with an aide, for a wound of the left posterior lateral leg. He had had wounds in this area in the past, and this most recent reopened a few weeks ago. I do not know who ordered it, but he is getting 3-layer wraps provided by [facility]. PAST MEDICAL HISTORY:  Significant for aortic stenosis, which is severe; type 2 diabetes, but his most recent A1c is 3.5; bilateral cataracts; congestive heart failure and pulmonary edema; chronic pain; chronic kidney disease, stage III; hypertension; right eye blindness; and decreased vision on the left. PREVIOUS OPERATIONS:  Total hip replacement. MEDICATIONS:  Bumex, potassium chloride, vitamins, timolol 0.5% eye drops in the left eye twice a day. PHYSICAL EXAMINATION:  GENERAL:  The patient is awake, alert, and conversant. He ambulates slowly with the use of a walker and with a caretaker helping him. VITAL SIGNS:  His temperature is 97.8, pulse 58, respirations 18, blood pressure 116/68. NEUROLOGIC EXAM:  Cranial nerves II through XII grossly intact, other than decreased vision on the left and absent vision on the right eye. HEAD/NECK EXAM:  Oral cavity, oropharynx, palpation of neck is normal.  LUNGS:  Clear to auscultation and percussion. HEART:  Regular rate and rhythm without murmur. ABDOMEN:  Soft, nontender. No organomegaly. BACK:  Nontender to palpation. EXTREMITIES:  Upper extremities, equal tone and strength bilaterally. Lower extremities, he had bilateral 3-layer wraps in place, they were quite loose that I do not know if they were applied loosely or if the edema decreased enough, so they no longer fit appropriately.   He has wrinkled skin of both lower leg, showing that he did have a significant amount of edema previously, but not so much today. He has palpable pulses and he has a wound of the left lower lateral leg in 7.5 x 7.2 x 0.1 cm, which certainly over states the dimensions of the wound since there is pale atrophic skin with multiple small wounds in this area. ASSESSMENT AND PLAN:  No wounds on the right leg. On the right leg, we are simply going to rewrap the 3-layer wrap for management of edema. On the left side, we are going to change to Aquacel Ag and a 3-layer wrap to 25% stretch, on both sides. I explained to the patient the importance and necessity of keeping his legs elevated as much as possible. I also explained to him how to do calf muscle exercises to improve venous outflow. I asked his aide to help him buy over-the-counter 10-15 mmHg closed toe knee-high stockings and he will start using one on the right side probably next week after we see him if he remains healed. I explained to the patient and to his aide that these stockings can stay on and just be removed and changed whenever he takes a shower. He does have a granddaughter, who helps, and he also has visiting nurses, who can help whenever he removes and reapplies his compression stockings in the future. They will then moisturize the skin of his legs with Vaseline petroleum jelly. He understands all this. I also explained to him the pathophysiology of venous leg ulcers and how elevation and calf muscle exercises are so beneficial.  I will see him again in followup in 1 week. All questions were answered. His condition on discharge is stable.         Marilu Quiñones MD      [id number]  D:  10/31/2019 16:06  T:  10/31/2019 18:02  JOB #:  [id number]  CC:  Alec Stern MD

## 2019-10-31 NOTE — DISCHARGE INSTRUCTIONS
Discharge Instructions for  [place] [id number] [facility], [address]  Telephone: [phone]    NAME:  Liban Carmona OF BIRTH:  12/26/1929  MEDICAL RECORD NUMBER:  [id number]  DATE:  10/31/2019    Wound Cleansing:   Do not scrub or use excessive force. Cleanse wound prior to applying a clean dressing with:  [x] Cleanse wound with Mild Soap & Water  [x] May Shower at Discharge   [] Other:       Topical Treatments:  Do not apply lotions, creams, or ointments to wound bed unless directed. [x] Apply moisturizing lotion to skin surrounding the wound prior to dressing change. A & D ointment       Dressings:           Wound Location  Left lower leg   [] Apply Primary Dressing:  [] Other:  Aquacel ag   [] Cover and Secure with:     [x] Gauze    Avoid contact of tape with skin. [] Change dressing:   [x] Other:Monday     Compression:  Apply: [x] Multilayer Compression Wrap Applied in Clinic 3 layer wraps [x]RightLeg [x]Left Leg   [] Multi-layer compression. Do not get leg(s) with wrap wet. If wraps become too tight call the center or completely remove the wrap. [x] Elevate leg(s) above the level of the heart when sitting. [x] Avoid prolonged standing in one place. Dietary:  [] Diet as tolerated: [x] Increase Protein:    Activity:  [x] Activity as tolerated:               Return Appointment:     [] ECF or Home Healthcare:  [] Wound Assessment: [] Physician or NP scheduled for Wound Assessment:   [] Return Appointment: With Satnam Belcher  in  *1 [facility])  [] Ordered tests: Get compression stockings 15 -20 mmhg over the counter bring to next visit next Thursday   Electronically signed on 10/31/2019 at 3:31 PM     Tiffanie Ye 281: Should you experience any significant changes in your wound(s) or have questions about your wound care, please contact the [address] [place] [address] at [address] [place] [address] 8:00 am - 4:30.   If you need help with your wound outside these hours and cannot wait until we are again available, contact your PCP or go to the hospital emergency room. PLEASE NOTE: IF YOU ARE UNABLE TO OBTAIN WOUND SUPPLIES, CONTINUE TO USE THE SUPPLIES YOU HAVE AVAILABLE UNTIL YOU ARE ABLE TO REACH US. IT IS MOST IMPORTANT TO KEEP THE WOUND COVERED AT ALL TIMES.       Physician Signature:_______________________    Date: ___________ Time:  ____________

## 2021-08-03 PROBLEM — D64.9 ANEMIA: Status: RESOLVED | Noted: 2019-02-07 | Resolved: 2021-08-03

## (undated) DEVICE — ANGIOGRAPHIC CATHETER: Brand: IMPULSE™

## (undated) DEVICE — GUIDEWIRE VASC L260CM DIA0.035IN TIP L3MM STD EXCHG PTFE J

## (undated) DEVICE — PINNACLE PRECISION ACCESS SYSTEM INTRODUCER SHEATH: Brand: PINNACLE PRECISION ACCESS SYSTEM

## (undated) DEVICE — PACK PROCEDURE SURG HRT CATH

## (undated) DEVICE — KIT HND CTRL 3 W STPCOCK ROT END 54IN PREM HI PRSS TBNG AT

## (undated) DEVICE — ANGIOGRAPHY KIT

## (undated) DEVICE — GUIDEWIRE VASC L150CM DIA0.025IN TIP L7CM J RAD 3MM PTFE

## (undated) DEVICE — CATHETER GUID 6FR 0.071IN COR AMPLATZ L 2 MID FLEXIBILITY

## (undated) DEVICE — 6FR THERMODILUTION BALLOON CATHETER SWAN (ARROW)

## (undated) DEVICE — KIT MED IMAG CNTRST AGNT W/ IOPAMIDOL REUSE

## (undated) DEVICE — KIT MFLD ISOLATN NACL CNTRST PRT TBNG SPIK W/ PRSS TRNSDUC

## (undated) DEVICE — ANGIO-SEAL VIP VASCULAR CLOSURE DEVICE: Brand: ANGIO-SEAL